# Patient Record
Sex: FEMALE | Employment: OTHER | URBAN - METROPOLITAN AREA
[De-identification: names, ages, dates, MRNs, and addresses within clinical notes are randomized per-mention and may not be internally consistent; named-entity substitution may affect disease eponyms.]

---

## 2022-06-16 PROCEDURE — U0005 INFEC AGEN DETEC AMPLI PROBE: HCPCS | Mod: ORL | Performed by: INTERNAL MEDICINE

## 2022-06-17 ENCOUNTER — LAB REQUISITION (OUTPATIENT)
Dept: LAB | Facility: CLINIC | Age: 87
End: 2022-06-17
Payer: MEDICARE

## 2022-06-17 DIAGNOSIS — U07.1 COVID-19: ICD-10-CM

## 2022-06-17 LAB — SARS-COV-2 RNA RESP QL NAA+PROBE: NEGATIVE

## 2022-06-22 VITALS
DIASTOLIC BLOOD PRESSURE: 80 MMHG | TEMPERATURE: 98 F | HEART RATE: 92 BPM | SYSTOLIC BLOOD PRESSURE: 138 MMHG | WEIGHT: 134 LBS | RESPIRATION RATE: 18 BRPM

## 2022-06-23 ENCOUNTER — ASSISTED LIVING VISIT (OUTPATIENT)
Dept: GERIATRICS | Facility: CLINIC | Age: 87
End: 2022-06-23
Payer: COMMERCIAL

## 2022-06-23 DIAGNOSIS — H90.3 SENSORINEURAL HEARING LOSS, BILATERAL: ICD-10-CM

## 2022-06-23 DIAGNOSIS — R41.89 COGNITIVE IMPAIRMENT: ICD-10-CM

## 2022-06-23 DIAGNOSIS — E03.9 ACQUIRED HYPOTHYROIDISM: ICD-10-CM

## 2022-06-23 DIAGNOSIS — H35.30 MACULAR DEGENERATION (SENILE) OF RETINA: ICD-10-CM

## 2022-06-23 DIAGNOSIS — M51.369 DDD (DEGENERATIVE DISC DISEASE), LUMBAR: Primary | ICD-10-CM

## 2022-06-23 DIAGNOSIS — J47.9 BRONCHIECTASIS WITHOUT ACUTE EXACERBATION (H): ICD-10-CM

## 2022-06-23 DIAGNOSIS — M85.80 OSTEOPENIA, UNSPECIFIED LOCATION: ICD-10-CM

## 2022-06-23 DIAGNOSIS — M54.16 LUMBAR RADICULOPATHY: ICD-10-CM

## 2022-06-23 DIAGNOSIS — R60.0 BILATERAL LEG EDEMA: ICD-10-CM

## 2022-06-23 PROCEDURE — U0003 INFECTIOUS AGENT DETECTION BY NUCLEIC ACID (DNA OR RNA); SEVERE ACUTE RESPIRATORY SYNDROME CORONAVIRUS 2 (SARS-COV-2) (CORONAVIRUS DISEASE [COVID-19]), AMPLIFIED PROBE TECHNIQUE, MAKING USE OF HIGH THROUGHPUT TECHNOLOGIES AS DESCRIBED BY CMS-2020-01-R: HCPCS | Mod: ORL | Performed by: INTERNAL MEDICINE

## 2022-06-23 NOTE — PROGRESS NOTES
[facility] GERIATRICS    PRIMARY CARE PROVIDER AND CLINIC:  Jacinto Skinner, APRN CNP, [address] [address]  Chief Complaint   Patient presents with     [facility] Medical Record Number:  [id number]  Place of Service where encounter took place:  [facility] ([facility]) [[id number]]    Grace Vega  is a 89 year old  (3/15/1929), admitted to the above facility with her  6/7/2022. They moved from [place] to be closer to family. Previous medical care was through [facility].     HPI:    Medical history significant for DDD lumbar with radiculopathy, gait instability, osteoarthritis, hypothyroidism, bronchiectasis, LE edema, macular degeneration, cognitive impairment, left peroneal vein DVT 3/2021.     She reports feeling well and is happy with the move to [place]. Chronic low back and bilateral LE pain are her main issues. Uses tylenol and tramadol with some relief. Uses a cane, walker and wheelchair for longer distances. No falls. Vision is poor but she is able to read print. Good appetite. Denies cough, shortness of breath or chest pain. She stopped taking lasix on her own several months ago with no change in LE edema.  Independent with cares.     CODE STATUS/ADVANCE DIRECTIVES DISCUSSION:  No Order  CPR/Full code   ALLERGIES:   Allergies   Allergen Reactions     Sulfa Drugs       PAST MEDICAL HISTORY:   Past Medical History:   Diagnosis Date     Acquired hypothyroidism      Bilateral leg edema      Bronchiectasis (H)      DDD (degenerative disc disease), lumbar      Deep venous thrombosis (DVT) of left peroneal vein (H) 09/2021     History of basal cell carcinoma      Lumbar radiculopathy      Macular degeneration (senile) of retina      Osteopenia      Pseudophakia     right eye     Sensorineural hearing loss, bilateral      Tinnitus, bilateral      Vertigo       PAST SURGICAL HISTORY:   has a past surgical history that includes  Wrist Ganglion Excision (Left, 1949); Foot surgery; and tonsillectomy.  FAMILY HISTORY: family history includes Colon Cancer in her brother; Diabetes in her brother; Prostate Cancer in her brother.  SOCIAL HISTORY:   reports that she has never smoked. She has never used smokeless tobacco. She reports current alcohol use. She reports that she does not use drugs.  Patient's living condition: lives in an assisted living facility. This is her 2nd marriage and they have several children. 2 live in the [facility] area. She is a retired musician and singer.     Post Discharge Medication Reconciliation Status: discharge medications reconciled, continue medications without change  Current Outpatient Medications   Medication Sig     levothyroxine (SYNTHROID/LEVOTHROID) 88 MCG tablet Take 1 tablet (88 mcg) by mouth daily     traMADol (ULTRAM) 50 MG tablet Take 1 tablet (50 mg) by mouth every 6 hours as needed for severe pain     No current facility-administered medications for this visit.       ROS:  10 point ROS of systems including Constitutional, Eyes, Respiratory, Cardiovascular, Gastroenterology, Genitourinary, Integumentary, Musculoskeletal, Psychiatric were all negative except for pertinent positives noted in my HPI.    Vitals:  /80   Pulse 92   Temp 98  F (36.7  C)   Resp 18   Wt 60.8 kg (134 lb)   Exam:  GENERAL APPEARANCE:  Alert, in no distress  ENT:  [place], oropharynx clear  EYES:  conjunctiva and lids normal  NECK:  no adenopathy, no thyromegaly  RESP:  lungs clear to auscultation , no respiratory distress  CV:  regular rate and rhythm, no murmur, 1+ bilateral LE edema   ABDOMEN:  soft, non-tender, no distension, no masses  M/S:   gait slow. LOPEZ with good strength. Degenerative changes of knees, no acute inflammation   SKIN:  no visible rashes or open areas  PSYCH:  oriented X 3, memory impaired , affect and mood normal    Lab/Diagnostic data:  Recent labs in [facility] reviewed by me today.     ASSESSMENT /  PLAN:  (M51.36) DDD (degenerative disc disease), lumbar  (primary encounter diagnosis)  (M54.16) Lumbar radiculopathy  Comment: long standing. MRI lumbar spine 8/8/2020 showed multilevel degenerative disc changes and height loss, most prominent at L4-L5 on the left.   Plan: continue tylenol, prn tramadol. Refer to [facility] for PHYSICAL THERAPY/OT.     (M85.80) Osteopenia, unspecified location  Comment: per imaging 2013. History of left wrist fracture 2019. Was on fosamax in the past.   Plan: discuss calcium-vitamin D at next visit     (J47.9) Bronchiectasis without acute exacerbation (H)  Comment: no acute issues and not on chronic pulmonary meds   Plan: monitor symptoms     (E03.9) Acquired hypothyroidism  Comment: TSH 0.80 on 4/21/2022  Plan: continue levothyroxine     (R60.0) Bilateral leg edema  Comment: chronic, no other s/s of volume overload.   Doppler US 9/7/2021 showed no change in chronic LE DVTs.   Plan: monitor     (H35.30) Macular degeneration (senile) of retina  Comment: chronic   Plan: continue Preservision. Establish Ophthalmology care.     (H90.3) Sensorineural hearing loss, bilateral  Comment: chronic   Plan: speak slowly and clearly     (R41.89) Cognitive impairment  Comment: appears to have mild deficits   Plan: cognitive testing per home OT. Facility staff will be assuming med admin           Total time spent with patient visit at the AL facility was 45 min including patient visit and review of past records. Greater than 50% of total time spent with counseling and coordinating care due to establishing primary care. Counseling patient and  re: medications and potential side effects, pain management, home care referral, plan of care. Coordinating the following with facility staff: medication orders, plan of care.       Electronically signed by:  FER Greene CNP

## 2022-06-23 NOTE — LETTER
6/23/2022        RE: Grace Vega  No address on file.        [facility] GERIATRICS    PRIMARY CARE PROVIDER AND CLINIC:  FER Greene CNP, [address] [facility] [address]***  Chief Complaint   Patient presents with     [facility] Medical Record Number:  [id number]  Place of Service where encounter took place:  [facility]) [[id number]]    Grace Vega  is a 89 year old  (3/15/1929), admitted to the above facility from home due to care needs ***. .   HPI:    ***    CODE STATUS/ADVANCE DIRECTIVES DISCUSSION:  No Order  CPR/Full code   ALLERGIES:   Allergies   Allergen Reactions     Sulfa Drugs       PAST MEDICAL HISTORY: No past medical history on file.   PAST SURGICAL HISTORY:   has no past surgical history on file.  FAMILY HISTORY: family history is not on file.  SOCIAL HISTORY:     Patient's living condition: lives in an assisted living facility    Post Discharge Medication Reconciliation Status: {ACO Med Rec (Provider):531172}  No current outpatient medications on file.     No current facility-administered medications for this visit.       ROS:  {ROS FGS:369028}    Vitals:  /80   Pulse 92   Temp 98  F (36.7  C)   Resp 18   Wt 60.8 kg (134 lb)   Exam:  {Nursing home physical exam :162703}    Lab/Diagnostic data:  {fgslab:514912}    ASSESSMENT/PLAN:    {FGS DX2:887515}    Orders:  {fgsorders:285749}  ***    Total time spent with patient visit at the [facility] nursing facility was {1/2/3/4/5:223868} including {1/2/3/4/5:208879}. Greater than 50% of total time spent with counseling and coordinating care due to ***.     Electronically signed by:  Cici Osborne ***                      Sincerely,        FER Greene CNP

## 2022-06-24 ENCOUNTER — LAB REQUISITION (OUTPATIENT)
Dept: LAB | Facility: CLINIC | Age: 87
End: 2022-06-24
Payer: MEDICARE

## 2022-06-24 DIAGNOSIS — U07.1 COVID-19: ICD-10-CM

## 2022-06-24 LAB — SARS-COV-2 RNA RESP QL NAA+PROBE: NEGATIVE

## 2022-06-28 PROBLEM — R42 VERTIGO: Status: ACTIVE | Noted: 2022-06-28

## 2022-06-28 PROBLEM — H93.13 TINNITUS, BILATERAL: Status: ACTIVE | Noted: 2022-06-28

## 2022-06-28 PROBLEM — M54.16 LUMBAR RADICULOPATHY: Status: ACTIVE | Noted: 2022-06-28

## 2022-06-28 PROBLEM — M51.369 DDD (DEGENERATIVE DISC DISEASE), LUMBAR: Status: ACTIVE | Noted: 2022-06-28

## 2022-06-28 PROBLEM — J47.9 BRONCHIECTASIS WITHOUT ACUTE EXACERBATION (H): Status: ACTIVE | Noted: 2022-06-28

## 2022-06-28 PROBLEM — Z85.828 HISTORY OF BASAL CELL CARCINOMA: Status: ACTIVE | Noted: 2022-06-28

## 2022-06-28 PROBLEM — M85.80 OSTEOPENIA: Status: ACTIVE | Noted: 2022-06-28

## 2022-06-28 PROBLEM — Z96.1 PSEUDOPHAKIA: Status: ACTIVE | Noted: 2022-06-28

## 2022-06-28 PROBLEM — H35.30 MACULAR DEGENERATION (SENILE) OF RETINA: Status: ACTIVE | Noted: 2022-06-28

## 2022-06-28 PROBLEM — E03.9 ACQUIRED HYPOTHYROIDISM: Status: ACTIVE | Noted: 2022-06-28

## 2022-06-28 PROBLEM — H90.3 SENSORINEURAL HEARING LOSS, BILATERAL: Status: ACTIVE | Noted: 2022-06-28

## 2022-06-28 PROBLEM — R60.0 BILATERAL LEG EDEMA: Status: ACTIVE | Noted: 2022-06-28

## 2022-06-28 RX ORDER — LEVOTHYROXINE SODIUM 88 UG/1
88 TABLET ORAL DAILY
COMMUNITY
End: 2022-07-08

## 2022-06-28 RX ORDER — TRAMADOL HYDROCHLORIDE 50 MG/1
50 TABLET ORAL EVERY 6 HOURS PRN
COMMUNITY
End: 2022-07-07

## 2022-07-07 DIAGNOSIS — M51.369 DDD (DEGENERATIVE DISC DISEASE), LUMBAR: Primary | ICD-10-CM

## 2022-07-07 RX ORDER — TRAMADOL HYDROCHLORIDE 50 MG/1
50 TABLET ORAL EVERY 6 HOURS PRN
Qty: 30 TABLET | Refills: 1 | Status: SHIPPED | OUTPATIENT
Start: 2022-07-07 | End: 2023-03-02 | Stop reason: DRUGHIGH

## 2022-07-08 DIAGNOSIS — E03.9 ACQUIRED HYPOTHYROIDISM: Primary | ICD-10-CM

## 2022-07-08 RX ORDER — LEVOTHYROXINE SODIUM 88 UG/1
88 TABLET ORAL DAILY
Qty: 30 TABLET | Refills: 11 | Status: SHIPPED | OUTPATIENT
Start: 2022-07-08 | End: 2023-05-12

## 2022-07-09 RX ORDER — MV-MIN/FA/VIT K/LUTEIN/ZEAXANT 200MCG-5MG
1 CAPSULE ORAL 2 TIMES DAILY
COMMUNITY
End: 2022-07-11

## 2022-07-11 ENCOUNTER — TELEPHONE (OUTPATIENT)
Dept: GERIATRICS | Facility: CLINIC | Age: 87
End: 2022-07-11

## 2022-07-11 DIAGNOSIS — E56.9 VITAMIN DEFICIENCY: Primary | ICD-10-CM

## 2022-07-11 RX ORDER — MV-MIN/FA/VIT K/LUTEIN/ZEAXANT 200MCG-5MG
1 CAPSULE ORAL DAILY
Qty: 30 CAPSULE | Refills: 11 | Status: SHIPPED | OUTPATIENT
Start: 2022-07-11 | End: 2023-06-14

## 2022-07-11 NOTE — TELEPHONE ENCOUNTER
[place] [facility]ERIATRIC [facility] NON-EMERGENT  ENCOUNTER    Grace Vega is a 89 year old  (3/15/1929), phone call received today regarding: home care    VO provided: PT 1 wk 4 then EOW x 4 weeks. For strengthening, balance and gait training.    Electronically signed by:   Chelsea Mark RN

## 2022-07-12 VITALS
HEART RATE: 92 BPM | WEIGHT: 134 LBS | RESPIRATION RATE: 18 BRPM | TEMPERATURE: 97.6 F | SYSTOLIC BLOOD PRESSURE: 120 MMHG | DIASTOLIC BLOOD PRESSURE: 80 MMHG

## 2022-07-12 NOTE — PROGRESS NOTES
[facility] GERIATRICS    Chief Complaint   Patient presents with     RECHECK     HPI:  Grace Vega is a 89 year old  (3/15/1929), who is being seen today for an episodic care visit at: THE [facility]) [[id number]].   She and her  moved to this facility 6/2022 to be closer to family and for a higher level of care. Previously lived in an apartment in [place] and received care through [facility].   Medical history significant for DDD lumbar with radiculopathy, gait instability, osteoarthritis, hypothyroidism, bronchiectasis, LE edema, macular degeneration, cognitive impairment, left peroneal vein DVT 3/2021.     Today's concern is:      DDD (degenerative disc disease), lumbar  Lumbar radiculopathy  Osteopenia, unspecified location  Bronchiectasis without acute exacerbation (H)  Bilateral leg edema  Cognitive impairment    She reports they are adjusting to the move, though having trouble learning to use the washing machine and the thermostat. Reports chronic back pain radiating down her LLE is unchanged. Also has pain of both feet with ambulation. Tries not to use prn tramadol. Denies new pain. No falls. Ambulates with a walker in the apt and uses a wheelchair to go to the dining room. Independent with toileting and dressing. Staff is now administering meds.     Allergies, and PMH/PSH reviewed in [facility]IC today    REVIEW OF SYSTEMS:  4 point ROS including Respiratory, CV, GI and , other than that noted in the HPI,  is negative    Objective:   /80   Pulse 92   Temp 97.6  F (36.4  C)   Resp 18   Wt 60.8 kg (134 lb)   GENERAL APPEARANCE:  Alert, in no distress, frail appearing   ENT:  [place], oropharynx clear  EYES:  conjunctiva and lids normal  NECK:  no adenopathy or thyromegaly   RESP:  lungs clear to auscultation   CV:  regular rate and rhythm, no murmur, trace bilateral LE edema   ABDOMEN:  soft, non-tender, no distension, no masses  M/S:   gait slow with  walker. LOPEZ with good strength. Degenerative changes of knees, no acute inflammation   SKIN:  no visible rashes or open areas  PSYCH:  oriented X 3, memory impaired , affect and mood normal    Recent labs in [facility] reviewed by me today.     ASSESSMENT / PLAN:  (M51.36) DDD (degenerative disc disease), lumbar  (primary encounter diagnosis)  (M54.16) Lumbar radiculopathy  Comment: chronic pain, affecting mobility and functional status.   MRI lumbar spine 8/8/2020 showed multilevel degenerative disc changes and height loss, most prominent at L4-L5 on the left.   Plan: start lidoderm patch to left hip. Continue tylenol, prn tramadol. Referral has been made to [facility] for PHYSICAL THERAPY/OT-scheduled to start this week.   Discussed with son Olayinka Vega. He lives in [place], but remains primary contact and is available to come to [place] needed.     (M85.80) Osteopenia, unspecified location  Comment: history of left wrist fracture 2019. Was on fosamax in the past.   Plan: consider adding calcium-vitamin D.     (J47.9) Bronchiectasis without acute exacerbation (H)  Comment: no respiratory issues. Not on pulmonary meds  Plan: monitor     (R60.0) Bilateral leg edema  Comment: chronic, minimal   History of chronic LE DVTs-unchanged on US 9/7/2021  Plan: monitor, elevate legs when able     (R41.89) Cognitive impairment  Comment: appears to have mild deficits with poor short term memory. Son has noticed gradual decline in cognition and self cares.   Plan: cognitive testing per home therapies. AL staff assistance with cares, meals, activities and med admin     (Z71.89) Advanced directives, counseling/discussion  Comment: current order is Full Code. Discussed with son who requests DNR/DNI   Plan: orders updated and POLST completed         Electronically signed by: FER Greene CNP

## 2022-07-13 ENCOUNTER — ASSISTED LIVING VISIT (OUTPATIENT)
Dept: GERIATRICS | Facility: CLINIC | Age: 87
End: 2022-07-13
Payer: COMMERCIAL

## 2022-07-13 DIAGNOSIS — M51.369 DDD (DEGENERATIVE DISC DISEASE), LUMBAR: Primary | ICD-10-CM

## 2022-07-13 DIAGNOSIS — J47.9 BRONCHIECTASIS WITHOUT ACUTE EXACERBATION (H): ICD-10-CM

## 2022-07-13 DIAGNOSIS — M85.80 OSTEOPENIA, UNSPECIFIED LOCATION: ICD-10-CM

## 2022-07-13 DIAGNOSIS — R41.89 COGNITIVE IMPAIRMENT: ICD-10-CM

## 2022-07-13 DIAGNOSIS — Z71.89 ADVANCED DIRECTIVES, COUNSELING/DISCUSSION: ICD-10-CM

## 2022-07-13 DIAGNOSIS — M54.16 LUMBAR RADICULOPATHY: ICD-10-CM

## 2022-07-13 DIAGNOSIS — R60.0 BILATERAL LEG EDEMA: ICD-10-CM

## 2022-07-13 RX ORDER — LIDOCAINE 4 G/G
1 PATCH TOPICAL EVERY 24 HOURS
Qty: 30 PATCH | Refills: 11 | Status: SHIPPED | OUTPATIENT
Start: 2022-07-13 | End: 2022-08-24

## 2022-07-13 NOTE — LETTER
7/13/2022        RE: Grace Vega  [id number] [place] [address] GERIATRICS    Chief Complaint   Patient presents with     RECHECK     HPI:  Grace Vega is a 89 year old  (3/15/1929), who is being seen today for an episodic care visit at: THE [facility] ([facility]) [[id number]]. Today's concern is: ***    Allergies, and PMH/PSH reviewed in [facility] today.  REVIEW OF SYSTEMS:  {rwvmeu40:378843}    Objective:   /80   Pulse 92   Temp 97.6  F (36.4  C)   Resp 18   Wt 60.8 kg (134 lb)   {Nursing home physical exam :217399}    {fgslab:168511}    Assessment/Plan:  {FGS DX2:851792}    Orders:  {fgsorders:909782}  ***    Electronically signed by: Cici Osborne ***            Sincerely,        FER Greene CNP

## 2022-07-30 ENCOUNTER — TELEPHONE (OUTPATIENT)
Dept: GERIATRICS | Facility: CLINIC | Age: 87
End: 2022-07-30

## 2022-08-24 DIAGNOSIS — M51.369 DDD (DEGENERATIVE DISC DISEASE), LUMBAR: ICD-10-CM

## 2022-08-24 RX ORDER — LIDOCAINE 4 G/G
1 PATCH TOPICAL EVERY 24 HOURS
Qty: 30 PATCH | Refills: 11 | Status: SHIPPED | OUTPATIENT
Start: 2022-08-24 | End: 2023-11-17

## 2022-08-26 ENCOUNTER — APPOINTMENT (OUTPATIENT)
Dept: CT IMAGING | Facility: CLINIC | Age: 87
End: 2022-08-26
Attending: INTERNAL MEDICINE
Payer: COMMERCIAL

## 2022-08-26 ENCOUNTER — HOSPITAL ENCOUNTER (EMERGENCY)
Facility: CLINIC | Age: 87
Discharge: NURSING, REHAB OR LONG-TERM CARE | End: 2022-08-26
Attending: INTERNAL MEDICINE | Admitting: INTERNAL MEDICINE
Payer: COMMERCIAL

## 2022-08-26 ENCOUNTER — APPOINTMENT (OUTPATIENT)
Dept: GENERAL RADIOLOGY | Facility: CLINIC | Age: 87
End: 2022-08-26
Attending: INTERNAL MEDICINE
Payer: COMMERCIAL

## 2022-08-26 VITALS
SYSTOLIC BLOOD PRESSURE: 155 MMHG | HEART RATE: 87 BPM | BODY MASS INDEX: 25.12 KG/M2 | RESPIRATION RATE: 17 BRPM | HEIGHT: 62 IN | OXYGEN SATURATION: 95 % | DIASTOLIC BLOOD PRESSURE: 126 MMHG | WEIGHT: 136.5 LBS | TEMPERATURE: 98.2 F

## 2022-08-26 DIAGNOSIS — S10.93XA CONTUSION OF FACE, SCALP AND NECK, INITIAL ENCOUNTER: ICD-10-CM
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
## 2022-08-26 DIAGNOSIS — S00.03XA CONTUSION OF FACE, SCALP AND NECK, INITIAL ENCOUNTER: ICD-10-CM

## 2022-08-26 DIAGNOSIS — S00.83XA CONTUSION OF FACE, SCALP AND NECK, INITIAL ENCOUNTER: ICD-10-CM

## 2022-08-26 DIAGNOSIS — S01.81XA LACERATION OF FOREHEAD, INITIAL ENCOUNTER: ICD-10-CM

## 2022-08-26 LAB
ALBUMIN SERPL BCG-MCNC: 3.9 G/DL (ref 3.5–5.2)
ALP SERPL-CCNC: 72 U/L (ref 35–104)
ALT SERPL W P-5'-P-CCNC: <5 U/L (ref 10–35)
ANION GAP SERPL CALCULATED.3IONS-SCNC: 10 MMOL/L (ref 7–15)
AST SERPL W P-5'-P-CCNC: 20 U/L (ref 10–35)
BASOPHILS # BLD AUTO: 0.1 10E3/UL (ref 0–0.2)
BASOPHILS NFR BLD AUTO: 1 %
BILIRUB SERPL-MCNC: 0.4 MG/DL
BUN SERPL-MCNC: 27.5 MG/DL (ref 8–23)
CALCIUM SERPL-MCNC: 9 MG/DL (ref 8.2–9.6)
CHLORIDE SERPL-SCNC: 108 MMOL/L (ref 98–107)
CREAT SERPL-MCNC: 1.64 MG/DL (ref 0.51–0.95)
DEPRECATED HCO3 PLAS-SCNC: 23 MMOL/L (ref 22–29)
EOSINOPHIL # BLD AUTO: 0.4 10E3/UL (ref 0–0.7)
EOSINOPHIL NFR BLD AUTO: 4 %
ERYTHROCYTE [DISTWIDTH] IN BLOOD BY AUTOMATED COUNT: 13.7 % (ref 10–15)
GFR SERPL CREATININE-BSD FRML MDRD: 29 ML/MIN/1.73M2
GLUCOSE SERPL-MCNC: 94 MG/DL (ref 70–99)
HCT VFR BLD AUTO: 37.9 % (ref 35–47)
HGB BLD-MCNC: 12.6 G/DL (ref 11.7–15.7)
HOLD SPECIMEN: NORMAL
HOLD SPECIMEN: NORMAL
IMM GRANULOCYTES # BLD: 0 10E3/UL
IMM GRANULOCYTES NFR BLD: 0 %
INR PPP: 0.96 (ref 0.85–1.15)
LYMPHOCYTES # BLD AUTO: 1.7 10E3/UL (ref 0.8–5.3)
LYMPHOCYTES NFR BLD AUTO: 15 %
MCH RBC QN AUTO: 33.2 PG (ref 26.5–33)
MCHC RBC AUTO-ENTMCNC: 33.2 G/DL (ref 31.5–36.5)
MCV RBC AUTO: 100 FL (ref 78–100)
MONOCYTES # BLD AUTO: 1 10E3/UL (ref 0–1.3)
MONOCYTES NFR BLD AUTO: 9 %
NEUTROPHILS # BLD AUTO: 7.8 10E3/UL (ref 1.6–8.3)
NEUTROPHILS NFR BLD AUTO: 71 %
NRBC # BLD AUTO: 0 10E3/UL
NRBC BLD AUTO-RTO: 0 /100
PLATELET # BLD AUTO: 274 10E3/UL (ref 150–450)
POTASSIUM SERPL-SCNC: 4.2 MMOL/L (ref 3.4–5.3)
PROT SERPL-MCNC: 6.9 G/DL (ref 6.4–8.3)
RBC # BLD AUTO: 3.79 10E6/UL (ref 3.8–5.2)
SODIUM SERPL-SCNC: 141 MMOL/L (ref 136–145)
WBC # BLD AUTO: 11.1 10E3/UL (ref 4–11)

## 2022-08-26 PROCEDURE — 93005 ELECTROCARDIOGRAM TRACING: CPT | Performed by: INTERNAL MEDICINE

## 2022-08-26 PROCEDURE — 250N000009 HC RX 250: Performed by: INTERNAL MEDICINE

## 2022-08-26 PROCEDURE — 73590 X-RAY EXAM OF LOWER LEG: CPT | Mod: RT

## 2022-08-26 PROCEDURE — 72125 CT NECK SPINE W/O DYE: CPT

## 2022-08-26 PROCEDURE — 90471 IMMUNIZATION ADMIN: CPT | Performed by: INTERNAL MEDICINE

## 2022-08-26 PROCEDURE — 12011 RPR F/E/E/N/L/M 2.5 CM/<: CPT | Performed by: INTERNAL MEDICINE

## 2022-08-26 PROCEDURE — 72125 CT NECK SPINE W/O DYE: CPT | Mod: 26 | Performed by: RADIOLOGY

## 2022-08-26 PROCEDURE — 99285 EMERGENCY DEPT VISIT HI MDM: CPT | Mod: 25 | Performed by: INTERNAL MEDICINE

## 2022-08-26 PROCEDURE — 93010 ELECTROCARDIOGRAM REPORT: CPT | Mod: 59 | Performed by: INTERNAL MEDICINE

## 2022-08-26 PROCEDURE — 250N000011 HC RX IP 250 OP 636: Performed by: INTERNAL MEDICINE

## 2022-08-26 PROCEDURE — 85025 COMPLETE CBC W/AUTO DIFF WBC: CPT | Performed by: INTERNAL MEDICINE

## 2022-08-26 PROCEDURE — 70450 CT HEAD/BRAIN W/O DYE: CPT | Mod: 26 | Performed by: RADIOLOGY

## 2022-08-26 PROCEDURE — 82040 ASSAY OF SERUM ALBUMIN: CPT | Performed by: INTERNAL MEDICINE

## 2022-08-26 PROCEDURE — 70450 CT HEAD/BRAIN W/O DYE: CPT

## 2022-08-26 PROCEDURE — 90715 TDAP VACCINE 7 YRS/> IM: CPT | Performed by: INTERNAL MEDICINE

## 2022-08-26 PROCEDURE — 73590 X-RAY EXAM OF LOWER LEG: CPT | Mod: 26 | Performed by: RADIOLOGY

## 2022-08-26 PROCEDURE — 85610 PROTHROMBIN TIME: CPT | Performed by: INTERNAL MEDICINE

## 2022-08-26 PROCEDURE — 36415 COLL VENOUS BLD VENIPUNCTURE: CPT | Performed by: INTERNAL MEDICINE

## 2022-08-26 PROCEDURE — 80053 COMPREHEN METABOLIC PANEL: CPT | Performed by: INTERNAL MEDICINE

## 2022-08-26 RX ORDER — LIDOCAINE HYDROCHLORIDE AND EPINEPHRINE 10; 10 MG/ML; UG/ML
5 INJECTION, SOLUTION INFILTRATION; PERINEURAL ONCE
Status: COMPLETED | OUTPATIENT
Start: 2022-08-26 | End: 2022-08-26

## 2022-08-26 RX ADMIN — LIDOCAINE HYDROCHLORIDE AND EPINEPHRINE 5 ML: 10; 10 INJECTION, SOLUTION INFILTRATION; PERINEURAL at 21:45

## 2022-08-26 RX ADMIN — CLOSTRIDIUM TETANI TOXOID ANTIGEN (FORMALDEHYDE INACTIVATED), CORYNEBACTERIUM DIPHTHERIAE TOXOID ANTIGEN (FORMALDEHYDE INACTIVATED), BORDETELLA PERTUSSIS TOXOID ANTIGEN (GLUTARALDEHYDE INACTIVATED), BORDETELLA PERTUSSIS FILAMENTOUS HEMAGGLUTININ ANTIGEN (FORMALDEHYDE INACTIVATED), BORDETELLA PERTUSSIS PERTACTIN ANTIGEN, AND BORDETELLA PERTUSSIS FIMBRIAE 2/3 ANTIGEN 0.5 ML: 5; 2; 2.5; 5; 3; 5 INJECTION, SUSPENSION INTRAMUSCULAR at 22:05

## 2022-08-26 ASSESSMENT — ENCOUNTER SYMPTOMS
NUMBNESS: 0
DYSURIA: 0
PALPITATIONS: 0
COUGH: 0
FEVER: 0
HEADACHES: 0
NAUSEA: 0
WEAKNESS: 0
ADENOPATHY: 0
WOUND: 1
SPEECH DIFFICULTY: 0
SHORTNESS OF BREATH: 0
LIGHT-HEADEDNESS: 0
NECK PAIN: 0
CONFUSION: 0
BACK PAIN: 0
CHILLS: 0
RECTAL PAIN: 0
ABDOMINAL PAIN: 0

## 2022-08-26 ASSESSMENT — ACTIVITIES OF DAILY LIVING (ADL)
ADLS_ACUITY_SCORE: 35

## 2022-08-26 NOTE — ED PROVIDER NOTES
ED Provider Note  [facility]      History     Chief Complaint   Patient presents with     Fall     HPI  Grace Vega is a 89 year old female who present for evaluation after a fall at her assisted living facility. She  Fell in the bathroom, striking her head against a wall as she slid down. She has a large right forehead hematoma. She denies headache, neck pain, back pain, chest pain or pain in her upper extremities including her shoulder, elbows and wrists. She has pain in her right knee and leg. She states this has been ongoing. She denies difficulty with vision, nausea, vomiting, cough, chest pain, abdominal pain or pain in the hips.    Past Medical History:   Diagnosis Date     Acquired hypothyroidism      Bilateral leg edema      Bronchiectasis (H)      DDD (degenerative disc disease), lumbar      Deep venous thrombosis (DVT) of left peroneal vein (H) 09/2021     History of basal cell carcinoma      Lumbar radiculopathy      Macular degeneration (senile) of retina      Osteopenia      Pseudophakia     right eye     Sensorineural hearing loss, bilateral      Tinnitus, bilateral      Vertigo        Past Surgical History:   Procedure Laterality Date     FOOT SURGERY       TONSILLECTOMY       WRIST GANGLION EXCISION Left 1949       Family History   Problem Relation Age of Onset     Prostate Cancer Brother      Diabetes Brother      Colon Cancer Brother        Social History     Tobacco Use     Smoking status: Never Smoker     Smokeless tobacco: Never Used   Substance Use Topics     Alcohol use: Yes     Comment: Occasional wine with dinner          Review of Systems   Constitutional: Negative for chills and fever.   HENT: Negative for congestion, nosebleeds and tinnitus.    Eyes: Negative for visual disturbance.   Respiratory: Negative for cough and shortness of breath.    Cardiovascular: Negative for chest pain, palpitations and leg swelling.   Gastrointestinal: Negative for abdominal  "pain, nausea and rectal pain.   Genitourinary: Negative for dysuria.   Musculoskeletal: Negative for back pain and neck pain.   Skin: Positive for wound. Negative for rash.   Neurological: Negative for speech difficulty, weakness, light-headedness, numbness and headaches.   Hematological: Negative for adenopathy.   Psychiatric/Behavioral: Negative for confusion.       Physical Exam   BP: (!) 169/77  Pulse: 87  Temp: 98.1  F (36.7  C)  Resp: 18  Height: 157.5 cm (5' 2\")  Weight: 61.9 kg (136 lb 8 oz) (bed scale)  SpO2: 98 %  Physical Exam  Vitals and nursing note reviewed.   Constitutional:       General: She is not in acute distress.  HENT:      Head: Abrasion, contusion and laceration present.      Jaw: There is normal jaw occlusion.        Right Ear: External ear normal.      Left Ear: External ear normal.      Nose: No nasal deformity, laceration (abrasion) or nasal tenderness.      Right Nostril: No epistaxis.      Left Nostril: No epistaxis.      Mouth/Throat:      Mouth: Mucous membranes are moist.   Eyes:      General: No scleral icterus.     Extraocular Movements: Extraocular movements intact.      Pupils: Pupils are equal, round, and reactive to light.   Cardiovascular:      Rate and Rhythm: Normal rate and regular rhythm.      Heart sounds: No murmur heard.  Pulmonary:      Effort: Pulmonary effort is normal.      Breath sounds: Normal breath sounds. No wheezing or rales.   Abdominal:      General: Abdomen is flat.      Palpations: Abdomen is soft.      Tenderness: There is no abdominal tenderness. There is no guarding.   Musculoskeletal:         General: Tenderness present.      Right shoulder: Normal.      Left shoulder: Normal.      Right upper arm: Normal.      Left upper arm: Normal.      Right elbow: Normal.      Left elbow: Normal.      Right forearm: Normal.      Left forearm: Normal.      Right wrist: Normal.      Left wrist: Normal.      Cervical back: Normal range of motion and neck supple. No " tenderness.      Right hip: Normal.      Left hip: Normal.      Right knee: Tenderness present.      Left knee: No tenderness.      Right lower leg: Tenderness present. No swelling.      Left lower leg: No swelling or tenderness.      Right ankle: Normal.      Left ankle: Normal.        Legs:    Skin:     General: Skin is warm and dry.   Neurological:      General: No focal deficit present.      Mental Status: She is alert.      Cranial Nerves: No cranial nerve deficit.      Sensory: No sensory deficit.      Motor: No weakness.   Psychiatric:         Mood and Affect: Mood normal.         Behavior: Behavior normal.         ED Course      Procedures            EKG Interpretation:      Interpreted by KALYAN BAUMANN MD  Time reviewed: 1838  Symptoms at time of EKG: None   Rhythm: normal sinus   Rate: Normal  Axis: Normal  Ectopy: none  Conduction: normal  ST Segments/ T Waves: T wave flattening Global  Q Waves: none  Comparison to prior: Unchanged from 2/14/18    Clinical Impression: no acute changes             Labs/Imaging    Results for orders placed or performed during the hospital encounter of 08/26/22 (from the past 24 hour(s))   EKG 12-lead, tracing only   Result Value Ref Range    Systolic Blood Pressure  mmHg    Diastolic Blood Pressure  mmHg    Ventricular Rate 85 BPM    Atrial Rate 85 BPM    MD Interval 120 ms    QRS Duration 70 ms     ms    QTc 440 ms    P Axis 9 degrees    R AXIS 15 degrees    T Axis 9 degrees    Interpretation ECG       Sinus rhythm  Nonspecific T wave abnormality  Abnormal ECG     CBC with platelets differential    Narrative    The following orders were created for panel order CBC with platelets differential.  Procedure                               Abnormality         Status                     ---------                               -----------         ------                     CBC with platelets and d...[[id number]]  Abnormal            Final result                 Please view  results for these tests on the individual orders.   INR   Result Value Ref Range    INR 0.96 0.85 - 1.15   Comprehensive metabolic panel   Result Value Ref Range    Sodium 141 136 - 145 mmol/L    Potassium 4.2 3.4 - 5.3 mmol/L    Creatinine 1.64 (H) 0.51 - 0.95 mg/dL    Urea Nitrogen 27.5 (H) 8.0 - 23.0 mg/dL    Chloride 108 (H) 98 - 107 mmol/L    Carbon Dioxide (CO2) 23 22 - 29 mmol/L    Anion Gap 10 7 - 15 mmol/L    Glucose 94 70 - 99 mg/dL    Calcium 9.0 8.2 - 9.6 mg/dL    Protein Total 6.9 6.4 - 8.3 g/dL    Albumin 3.9 3.5 - 5.2 g/dL    Bilirubin Total 0.4 <=1.2 mg/dL    Alkaline Phosphatase 72 35 - 104 U/L    AST 20 10 - 35 U/L    ALT <5 (L) 10 - 35 U/L    GFR Estimate 29 (L) >60 mL/min/1.73m2   [place] Draw    Narrative    The following orders were created for panel order [place] Draw.  Procedure                               Abnormality         Status                     ---------                               -----------         ------                     Extra Red Top Tube[[id number]]                               Final result                 Please view results for these tests on the individual orders.   CBC with platelets and differential   Result Value Ref Range    WBC Count 11.1 (H) 4.0 - 11.0 10e3/uL    RBC Count 3.79 (L) 3.80 - 5.20 10e6/uL    Hemoglobin 12.6 11.7 - 15.7 g/dL    Hematocrit 37.9 35.0 - 47.0 %     78 - 100 fL    MCH 33.2 (H) 26.5 - 33.0 pg    MCHC 33.2 31.5 - 36.5 g/dL    RDW 13.7 10.0 - 15.0 %    Platelet Count 274 150 - 450 10e3/uL    % Neutrophils 71 %    % Lymphocytes 15 %    % Monocytes 9 %    % Eosinophils 4 %    % Basophils 1 %    % Immature Granulocytes 0 %    NRBCs per 100 WBC 0 <1 /100    Absolute Neutrophils 7.8 1.6 - 8.3 10e3/uL    Absolute Lymphocytes 1.7 0.8 - 5.3 10e3/uL    Absolute Monocytes 1.0 0.0 - 1.3 10e3/uL    Absolute Eosinophils 0.4 0.0 - 0.7 10e3/uL    Absolute Basophils 0.1 0.0 - 0.2 10e3/uL    Absolute Immature Granulocytes 0.0 <=0.4 10e3/uL    Absolute  NRBCs 0.0 10e3/uL   Extra Red Top Tube   Result Value Ref Range    Hold Specimen JIC    Extra Tube    Narrative    The following orders were created for panel order Extra Tube.  Procedure                               Abnormality         Status                     ---------                               -----------         ------                     Extra Green Top (Lithium...[[id number]]                      In process                   Please view results for these tests on the individual orders.   CT Head w/o Contrast    Narrative    CT HEAD W/O CONTRAST 8/26/2022 7:24 PM    Provided History: head trauma  ICD-10:    Comparison: None.    Technique: Using multidetector thin collimation helical acquisition  technique, axial, coronal and sagittal CT images from the skull base  to the vertex were obtained without intravenous contrast.     Findings:    No intracranial hemorrhage, mass effect, or midline shift. The  ventricles are proportionate to the cerebral sulci. The gray to white  matter differentiation of the cerebral hemispheres is preserved. The  basal cisterns are patent. Mild-to-moderate diffuse cerebral atrophy  and vascular calcifications. Mild periventricular white matter  hypoattenuation.       Small right frontal scalp hematoma and laceration. The bony calvarium  is within normal limits.The visualized paranasal sinuses are clear.  The mastoid air cells are clear. Right pseudophakia.       Impression    Impression:  1. No acute intracranial pathology.   2. Frontal scalp hematoma and laceration.  3. Mild to moderate diffuse cerebral atrophy and mild small vessel  ischemic disease.    I have personally reviewed the examination and initial interpretation  and I agree with the findings.    KALYAN WANG MD         SYSTEM ID:  [id number]   CT Cervical Spine w/o Contrast    Narrative    CT CERVICAL SPINE W/O CONTRAST 8/26/2022 7:24 PM    Provided History: head trauma    Comparison: None    Technique: Using  multidetector thin collimation helical acquisition  technique, axial, coronal and sagittal CT images through the cervical  spine were obtained without intravenous contrast.     Findings:  The cervical vertebrae are normally aligned. Straightening of normal  cervical lordosis. No acute fracture or traumatic subluxation. Mild  anterolisthesis of C4 on C5. No prevertebral edema. Nonunion of the  posterior arch of C1, normal variant. There is ankylosis of the  posterior elements of C2-C4 on the right and from C2 through C3 on the  left There is disc height narrowing at C3-4, C5-6, and C6-7.  Multilevel degenerative changes with osteophytic spurring. The  findings on a level by level basis are as follows:    C2-3:  No spinal canal or neural foraminal stenosis.    C3-4:  Posterior disc osteophyte complex and facet arthropathy with  mild bilateral neural foraminal narrowing. The spinal canal is patent.    C4-5:  Posterior disc osteophyte complex and bilateral facet  arthropathy with mild bilateral neural foraminal narrowing. The spinal  canal is patent.    C5-6:  Posterior disc osteophyte complex, uncinate hypertrophy and  bilateral facet arthropathy with moderate bilateral neural foraminal  narrowing. Mild spinal canal stenosis.    C6-7:  Posterior disc osteophyte complex and facet arthropathy with  mild to moderate bilateral neural foraminal narrowing. Mild spinal  canal stenosis.    C7-T1:  No spinal canal or neural foraminal stenosis.     No abnormality of the paraspinous soft tissues.      Impression    Impression:     1. No acute fracture or traumatic subluxation.  2. Multilevel degenerative changes of the cervical spine, greatest at  C5-6 with moderate bilateral neural foraminal narrowing and mild  spinal canal stenosis.    I have personally reviewed the examination and initial interpretation  and I agree with the findings.    KALYAN WANG MD         SYSTEM ID:  [id number]   XR Tibia & Fibula Right 2 Views     Narrative    EXAM: XR TIBIA AND FIBULA RIGHT 2 VIEWS  LOCATION: [facility]  DATE/TIME: 8/26/2022 8:51 PM    INDICATION: Fall, pain.  COMPARISON: None.      Impression    IMPRESSION:   1. Diffuse bone demineralization.  2. No fracture.  3. Extensive arterial calcifications.  4. Hallux valgus, lateral subluxation of the sesamoids, bunion and first MTP joint osteoarthrosis.  5. Tricompartmental degenerative changes in the knee.     [facility] Procedure Note        Laceration Repair:    Performed by: KALYAN BAUMANN MD  Authorized by: KALYAN BAUMANN MD  Consent given by: Patient who states understanding of the procedure being performed after discussing the risks, benefits and alternatives.    Preparation: Patient was prepped and draped in usual sterile fashion.  Irrigation solution: saline    Body area:face  Laceration length: 2cm  Contamination: The wound is not contaminated.  Foreign bodies:none  Tendon involvement: none  Anesthesia: Local  Local anesthetic: Lidocaine     1%, with epinephrine  Anesthetic total: 3ml    Debridement: none  Skin closure: Closed with 3 x 4.0 Ethilon  Technique: interrupted  Approximation: close  Approximation difficulty: simple    Patient tolerance: Patient tolerated the procedure well with no immediate complications.  Medications   lidocaine 1% with EPINEPHrine 1:100,000 injection 5 mL (has no administration in time range)        Assessments & Plan (with Medical Decision Making)   Impression:  Elderly woman presents with a fall in the bathroom at her assisted living facility. She has a small laceration in the mid forehead and moderately large hematoma of the right forehead. She has an abrasion of the left occiput. She has no intracranial hemorrhage, no fractures and no acute traumatic injuries other than the scalp hematoma. She has abrasion on the bridge of the nose without evident fractures. She has diffuse degenerative changes of  the cervical spine without acute traumatic injury. She has no other evidence of trauma. She has chronic bilateral knee pain and her right knee is fairly tender on exam. There are no acute fractures on XR. EKG, CBC and electrolytes are unremarkable. She has CKD of uncertain duration. She has not yet established primary medical care since moving here from [place] a couple of months ago.    I have reviewed the nursing notes. I have reviewed the findings, diagnosis, plan and need for follow up with the patient.    New Prescriptions    No medications on file       Final diagnoses:   Fall from standing, initial encounter   Laceration of forehead, initial encounter   Contusion of face, scalp and neck, initial encounter       --  Chi Aguirre  [facility] EMERGENCY DEPARTMENT  8/26/2022     Chi Aguirre MD  08/26/22 3043

## 2022-08-26 NOTE — ED TRIAGE NOTES
Patient presents to ED after a fall. Patient reports she fell in the bathroom at her assisted living. Patient denies loss of consciousness and head/neck pain. Patient denies being on a blood thinner.Patient reports right knee pain, which she states she had prior to fall.

## 2022-08-27 LAB
ATRIAL RATE - MUSE: 85 BPM
DIASTOLIC BLOOD PRESSURE - MUSE: NORMAL MMHG
INTERPRETATION ECG - MUSE: NORMAL
P AXIS - MUSE: 9 DEGREES
PR INTERVAL - MUSE: 120 MS
QRS DURATION - MUSE: 70 MS
QT - MUSE: 370 MS
QTC - MUSE: 440 MS
R AXIS - MUSE: 15 DEGREES
SYSTOLIC BLOOD PRESSURE - MUSE: NORMAL MMHG
T AXIS - MUSE: 9 DEGREES
VENTRICULAR RATE- MUSE: 85 BPM

## 2022-08-27 NOTE — DISCHARGE INSTRUCTIONS
Bacitracin/ gauze dressing to the wounds.  Elastic compression dressing for 1 day.  Suture removal in 6-8 days.   Follow up with Jacinto Skinner.at [facility]. [phone].  Return for headaches, fever, changes in level of alertness or any new symptoms.

## 2022-08-29 ENCOUNTER — NURSE TRIAGE (OUTPATIENT)
Dept: NURSING | Facility: CLINIC | Age: 87
End: 2022-08-29

## 2022-08-29 NOTE — TELEPHONE ENCOUNTER
Patient calling requesting to schedule suture removal.    Sutures placed in forehead on 8/26/22 at [facility].    Per discharge instructions to remove 6-8 days after. Denies further questions on symptoms.    Requesting appointment only.     Transferred to Central Scheduling.    Sandra Buckner RN  [facility]        Reason for Disposition    Information only question and nurse able to answer    Additional Information    Negative: Nursing judgment    Negative: Nursing judgment    Negative: Nursing judgment    Negative: Nursing judgment    Protocols used: INFORMATION ONLY CALL - NO TRIAGE-A-OH

## 2022-08-31 PROBLEM — R52 PAIN: Status: ACTIVE | Noted: 2019-01-10

## 2022-08-31 PROBLEM — H25.812 COMBINED FORMS OF AGE-RELATED CATARACT OF LEFT EYE: Status: ACTIVE | Noted: 2021-03-18

## 2022-08-31 PROBLEM — M25.60 STIFFNESS OF MULTIPLE JOINTS: Status: ACTIVE | Noted: 2019-01-10

## 2022-08-31 PROBLEM — M25.532 LEFT WRIST PAIN: Status: ACTIVE | Noted: 2019-01-07

## 2022-08-31 PROBLEM — I82.452 ACUTE DEEP VEIN THROMBOSIS (DVT) OF LEFT PERONEAL VEIN (H): Status: ACTIVE | Noted: 2021-03-29

## 2022-08-31 PROBLEM — R05.3 CHRONIC COUGH: Status: ACTIVE | Noted: 2019-01-07

## 2022-08-31 PROBLEM — H40.033 ANATOMICAL NARROW ANGLE, BILATERAL: Status: ACTIVE | Noted: 2019-09-06

## 2022-08-31 PROBLEM — S52.502A CLOSED FRACTURE OF LEFT DISTAL RADIUS: Status: ACTIVE | Noted: 2019-01-07

## 2022-08-31 NOTE — PROGRESS NOTES
[facility] GERIATRICS    Chief Complaint   Patient presents with     RECHECK     HPI:  Grace Vega is a 89 year old  (3/15/1929), who is being seen today for an episodic care visit at: THE [facility]) [[id number]].   She and her  moved to this facility 6/2022 to be closer to family and for a higher level of care. Previously lived in an apartment in [place] and received care through [facility].   Medical history significant for DDD lumbar with radiculopathy, gait instability, osteoarthritis, hypothyroidism, bronchiectasis, CKD stage 3, LE edema, macular degeneration, cognitive impairment, left peroneal vein DVT 3/2021.     Today's concern is:      Facial laceration, subsequent encounter  Fall, subsequent encounter  Bronchiectasis without acute exacerbation (H)  DDD (degenerative disc disease), lumbar  Lumbar radiculopathy  Osteopenia, unspecified location  Cognitive impairment  Physical deconditioning  She is seen today to follow up after an ED visit 8/26/2022 for a mechanical fall with head strike. She slipped on the wet floor while doing laundry. CT head showed a right frontal scalp hematoma,mild to moderate diffuse cerebral atrophy and mild small vessel ischemic disease, no acute intracranial pathology. CT cervical spine showed multilevel degenerative changes and no acute fracture or subluxation. XR right tibia and fibula negative for fracture. Creatinine was 1.64. Facial laceration was repaired with 3 sutures.   Jeff Vega is present. She reports feeling fairly well. Denies headache or facial pain. Bruising has improved. Reports her chronic cough has been worse since moving to [place]. No shortness of breath or chest pain. Using Robitussin prn without much improvement. Good appetite. Continues to work with therapies. Ambulates short distances with a walker, uses a wheelchair for longer distances outside of their apartment. Independent with toileting and  dressing.       Allergies, and PMH/PSH reviewed in EPIC today    REVIEW OF SYSTEMS:  4 point ROS including Respiratory, CV, GI and , other than that noted in the HPI,  is negative    Objective:   /80   Pulse 60   Temp 98.5  F (36.9  C)   Resp 13   SpO2 96%   GENERAL APPEARANCE:  Alert, in no distress, frail appearing   ENT:  [place], oropharynx clear  EYES:  conjunctiva and lids normal  NECK:  no adenopathy or thyromegaly   RESP:  lungs clear to auscultation, no crackles or wheezes   CV:  regular rate and rhythm, no murmur, trace bilateral LE edema   ABDOMEN:  soft, non-tender, no distension, no masses  M/S:   gait slow with walker. LOPEZ with good strength. Degenerative changes of knees, no acute inflammation   SKIN:  fading facial bruising. 3 sutures mid forehead removed, laceration clean, no erythema   PSYCH:  oriented X 3, memory impaired , affect and mood normal    Recent labs in [facility] reviewed by me today.     ASSESSMENT / PLAN:  (S01.81XD) Facial laceration, subsequent encounter  (primary encounter diagnosis)  (W19.XXXD) Fall, subsequent encounter  Comment: laceration healing without signs of infection and sutures removed. Facial bruising resolving. No other injuries   Plan: monitor, continue therapies.     (J47.9) Bronchiectasis without acute exacerbation (H)  Comment: chronic cough is unchanged. Lungs clear on exam. Not on chronic pulmonary meds   Plan: Robitussin prn. CXR if cough worsens     (N18.32) Stage 3b chronic kidney disease (H)  Comment: creatinine 1. 64 is up from baseline creatinine 1.3   Plan: encourage fluids. Repeat BMP in a few weeks. Avoid nephrotoxins, renal dose meds.     (M51.36) DDD (degenerative disc disease), lumbar  (M54.16) Lumbar radiculopathy  Comment: chronic pain, poor mobility. She declines trying stronger pain meds due to potential side effects. Has had epidural steroid injection in the past without much improvement.   MRI lumbar spine 8/8/2020 showed multilevel  degenerative disc changes and height loss, most prominent at L4-L5 on the left.   Plan: continue tylenol, lidoderm patch, prn tramadol. Continue therapies.     (R41.89) Cognitive impairment  Comment: mild to moderate deficits, poor short term memory   Plan: [place] staff assistance with cares, meals, activities, med admin. Family very involved and assists with laundry, shopping, finances.     (R53.81) Physical deconditioning  Comment: continues to progress in therapies   Plan: continue home PHYSICAL THERAPY/OT. Walker or wheelchair for mobility         Electronically signed by: FER Greene CNP

## 2022-09-01 VITALS
SYSTOLIC BLOOD PRESSURE: 120 MMHG | OXYGEN SATURATION: 96 % | DIASTOLIC BLOOD PRESSURE: 80 MMHG | TEMPERATURE: 98.5 F | RESPIRATION RATE: 13 BRPM | HEART RATE: 60 BPM

## 2022-09-02 ENCOUNTER — ASSISTED LIVING VISIT (OUTPATIENT)
Dept: GERIATRICS | Facility: CLINIC | Age: 87
End: 2022-09-02
Payer: COMMERCIAL
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
## 2022-09-02 DIAGNOSIS — N18.32 STAGE 3B CHRONIC KIDNEY DISEASE (H): ICD-10-CM

## 2022-09-02 DIAGNOSIS — J47.9 BRONCHIECTASIS WITHOUT ACUTE EXACERBATION (H): ICD-10-CM

## 2022-09-02 DIAGNOSIS — S01.81XD FACIAL LACERATION, SUBSEQUENT ENCOUNTER: Primary | ICD-10-CM

## 2022-09-02 DIAGNOSIS — M51.369 DDD (DEGENERATIVE DISC DISEASE), LUMBAR: ICD-10-CM

## 2022-09-02 DIAGNOSIS — M54.16 LUMBAR RADICULOPATHY: ICD-10-CM

## 2022-09-02 DIAGNOSIS — R41.89 COGNITIVE IMPAIRMENT: ICD-10-CM

## 2022-09-02 DIAGNOSIS — R53.81 PHYSICAL DECONDITIONING: ICD-10-CM

## 2022-09-02 NOTE — LETTER
9/2/2022        RE: Grace Vega  [facility]  [address] [address]        [facility] GERIATRICS    Chief Complaint   Patient presents with     RECHECK     HPI:  Grace Vega is a 89 year old  (3/15/1929), who is being seen today for an episodic care visit at: THE [facility] ([facility]) [[id number]].   She and her  moved to this facility 6/2022 to be closer to family and for a higher level of care. Previously lived in an apartment in [place] and received care through [facility].   Medical history significant for DDD lumbar with radiculopathy, gait instability, osteoarthritis, hypothyroidism, bronchiectasis, CKD stage 3, LE edema, macular degeneration, cognitive impairment, left peroneal vein DVT 3/2021.     Today's concern is:      Facial laceration, subsequent encounter  Fall, subsequent encounter  Bronchiectasis without acute exacerbation (H)  DDD (degenerative disc disease), lumbar  Lumbar radiculopathy  Osteopenia, unspecified location  Cognitive impairment  Physical deconditioning  She is seen today to follow up after an ED visit 8/26/2022 for a mechanical fall with head strike. She slipped on the wet floor while doing laundry. CT head showed a right frontal scalp hematoma,mild to moderate diffuse cerebral atrophy and mild small vessel ischemic disease, no acute intracranial pathology. CT cervical spine showed multilevel degenerative changes and no acute fracture or subluxation. XR right tibia and fibula negative for fracture. Creatinine was 1.64. Facial laceration was repaired with 3 sutures.   Jeff Vega is present. She reports feeling fairly well. Denies headache or facial pain. Bruising has improved. Reports her chronic cough has been worse since moving to [place]. No shortness of breath or chest pain. Using Robitussin prn without much improvement. Good appetite. Continues to work with therapies. Ambulates short  distances with a walker, uses a wheelchair for longer distances outside of their apartment. Independent with toileting and dressing.       Allergies, and PMH/PSH reviewed in EPIC today    REVIEW OF SYSTEMS:  4 point ROS including Respiratory, CV, GI and , other than that noted in the HPI,  is negative    Objective:   /80   Pulse 60   Temp 98.5  F (36.9  C)   Resp 13   SpO2 96%   GENERAL APPEARANCE:  Alert, in no distress, frail appearing   ENT:  [place], oropharynx clear  EYES:  conjunctiva and lids normal  NECK:  no adenopathy or thyromegaly   RESP:  lungs clear to auscultation, no crackles or wheezes   CV:  regular rate and rhythm, no murmur, trace bilateral LE edema   ABDOMEN:  soft, non-tender, no distension, no masses  M/S:   gait slow with walker. LOPEZ with good strength. Degenerative changes of knees, no acute inflammation   SKIN:  fading facial bruising. 3 sutures mid forehead removed, laceration clean, no erythema   PSYCH:  oriented X 3, memory impaired , affect and mood normal    Recent labs in [facility] reviewed by me today.     ASSESSMENT / PLAN:  (S01.81XD) Facial laceration, subsequent encounter  (primary encounter diagnosis)  (W19.XXXD) Fall, subsequent encounter  Comment: laceration healing without signs of infection and sutures removed. Facial bruising resolving. No other injuries   Plan: monitor, continue therapies.     (J47.9) Bronchiectasis without acute exacerbation (H)  Comment: chronic cough is unchanged. Lungs clear on exam. Not on chronic pulmonary meds   Plan: Robitussin prn. CXR if cough worsens     (N18.32) Stage 3b chronic kidney disease (H)  Comment: creatinine 1. 64 is up from baseline creatinine 1.3   Plan: encourage fluids. Repeat BMP in a few weeks. Avoid nephrotoxins, renal dose meds.     (M51.36) DDD (degenerative disc disease), lumbar  (M54.16) Lumbar radiculopathy  Comment: chronic pain, poor mobility. She declines trying stronger pain meds due to potential side effects. Has  had epidural steroid injection in the past without much improvement.   MRI lumbar spine 8/8/2020 showed multilevel degenerative disc changes and height loss, most prominent at L4-L5 on the left.   Plan: continue tylenol, lidoderm patch, prn tramadol. Continue therapies.     (R41.89) Cognitive impairment  Comment: mild to moderate deficits, poor short term memory   Plan: AL staff assistance with cares, meals, activities, med admin. Family very involved and assists with laundry, shopping, finances.     (R53.81) Physical deconditioning  Comment: continues to progress in therapies   Plan: continue home PHYSICAL THERAPY/OT. Walker or wheelchair for mobility         Electronically signed by: FER Greene CNP             Sincerely,        FER Greene CNP

## 2022-09-14 PROBLEM — I82.452 ACUTE DEEP VEIN THROMBOSIS (DVT) OF LEFT PERONEAL VEIN (H): Status: RESOLVED | Noted: 2021-03-29 | Resolved: 2022-09-14

## 2022-09-15 ENCOUNTER — ASSISTED LIVING VISIT (OUTPATIENT)
Dept: GERIATRICS | Facility: CLINIC | Age: 87
End: 2022-09-15
Payer: COMMERCIAL

## 2022-09-15 VITALS
BODY MASS INDEX: 24.84 KG/M2 | OXYGEN SATURATION: 99 % | TEMPERATURE: 97.8 F | HEIGHT: 62 IN | HEART RATE: 81 BPM | SYSTOLIC BLOOD PRESSURE: 144 MMHG | RESPIRATION RATE: 16 BRPM | DIASTOLIC BLOOD PRESSURE: 71 MMHG | WEIGHT: 135 LBS

## 2022-09-15 DIAGNOSIS — M15.9 OSTEOARTHRITIS OF MULTIPLE JOINTS, UNSPECIFIED OSTEOARTHRITIS TYPE: Primary | ICD-10-CM

## 2022-09-15 DIAGNOSIS — R41.89 COGNITIVE IMPAIRMENT: ICD-10-CM

## 2022-09-15 DIAGNOSIS — M54.16 LUMBAR RADICULOPATHY: ICD-10-CM

## 2022-09-15 DIAGNOSIS — R53.81 PHYSICAL DECONDITIONING: ICD-10-CM

## 2022-09-15 DIAGNOSIS — M51.369 DDD (DEGENERATIVE DISC DISEASE), LUMBAR: ICD-10-CM

## 2022-09-15 DIAGNOSIS — M85.80 OSTEOPENIA, UNSPECIFIED LOCATION: ICD-10-CM

## 2022-09-15 RX ORDER — ACETAMINOPHEN 500 MG
1000 TABLET ORAL 2 TIMES DAILY PRN
COMMUNITY
End: 2023-05-12

## 2022-09-15 NOTE — LETTER
"    9/15/2022        RE: Grace Vega  [id number] [place] [address] GERIATRICS    Chief Complaint   Patient presents with     RECHECK     HPI:  Grace Vega is a 89 year old  (3/15/1929), who is being seen today for an episodic care visit at: [facility] ([facility]) [[id number]].   She and her  moved to this facility 6/2022 to be closer to family and for a higher level of care. Previously lived in an apartment in [place] and received care through [facility].   Medical history significant for DDD lumbar with radiculopathy, gait instability, osteoarthritis, hypothyroidism, bronchiectasis, CKD stage 3, LE edema, macular degeneration, cognitive impairment, left peroneal vein DVT 3/2021.    Today's concern is:      Osteoarthritis of multiple joints, unspecified osteoarthritis type  DDD (degenerative disc disease), lumbar  Lumbar radiculopathy  Osteopenia, unspecified location  Cognitive impairment  Physical deconditioning  She reports worsening pain of both LE, \"from my hips to my knees.\" Left hip is worse than the right, both knees are very painful. Having more difficulty  walking and getting up from a sitting position. Uses a wheelchair outside of the apartment. Recently worked with therapies. Pain is no worse after a fall 8/26/2022. Doesn't like to take tramadol or tylenol because she feels they affect her vision.     Allergies, and PMH/PSH reviewed in EPIC today.  REVIEW OF SYSTEMS:  4 point ROS including Respiratory, CV, GI and , other than that noted in the HPI,  is negative    Objective:   BP (!) 144/71   Pulse 81   Temp 97.8  F (36.6  C)   Resp 16   Ht 1.575 m (5' 2\")   Wt 61.2 kg (135 lb)   SpO2 99%   BMI 24.69 kg/m    GENERAL APPEARANCE:  Alert, in no distress, frail appearing   ENT:  [place], oropharynx clear  EYES:  conjunctiva and lids normal  NECK:  no adenopathy or " thyromegaly   RESP:  lungs clear to auscultation   CV:  regular rate and rhythm, no murmur, no LE edema   ABDOMEN:  soft, non-tender, no distension, no masses  M/S:   gait slow with walker. LOPEZ with good strength. Degenerative changes both knees, no warmth or erythema   SKIN:  no visible rashes or open areas  PSYCH:  oriented X 3, memory impaired , affect and mood normal       Recent labs in [facility] reviewed by me today.     ASSESSMENT / PLAN:  (M15.9) Osteoarthritis of multiple joints, unspecified osteoarthritis type  (primary encounter diagnosis)  (M51.36) DDD (degenerative disc disease), lumbar  (M54.16) Lumbar radiculopathy  Comment: severe pain of hips and knees, affecting her mobility and functional status.   Plan: trial of diclofenac gel to both knees. Continue lidocaine patch to low back, tylenol and tramadol prn.     Addendum 9/19/2022: Discussed with Huber TAVERAS, who will visit this week to evaluate for steroid injections. Called step son Olayinka Vega who agrees with this plan and will be in town that day.     (M85.80) Osteopenia, unspecified location  Comment: history of left wrist fracture 2019. Was on fosamax in the past.   Plan: consider adding calcium-vitamin D-discuss at next visit     (R41.89) Cognitive impairment  Comment: mild to moderate deficits with poor short term memory   Plan: AL staff assistance with cares, meals, activities, med admin     (R53.81) Physical deconditioning  Comment: completed home therapies   Plan: activity as tolerated. Walker or wheelchair for mobility.         Electronically signed by: FER Greene CNP           Sincerely,        FER Greene CNP

## 2022-09-15 NOTE — PROGRESS NOTES
"[facility] GERIATRICS    Chief Complaint   Patient presents with     RECHECK     HPI:  Grace Vega is a 89 year old  (3/15/1929), who is being seen today for an episodic care visit at: [facility] ([facility]) [[id number]].   She and her  moved to this facility 6/2022 to be closer to family and for a higher level of care. Previously lived in an apartment in [place] and received care through [facility].   Medical history significant for DDD lumbar with radiculopathy, gait instability, osteoarthritis, hypothyroidism, bronchiectasis, CKD stage 3, LE edema, macular degeneration, cognitive impairment, left peroneal vein DVT 3/2021.    Today's concern is:      Osteoarthritis of multiple joints, unspecified osteoarthritis type  DDD (degenerative disc disease), lumbar  Lumbar radiculopathy  Osteopenia, unspecified location  Cognitive impairment  Physical deconditioning  She reports worsening pain of both LE, \"from my hips to my knees.\" Left hip is worse than the right, both knees are very painful. Having more difficulty  walking and getting up from a sitting position. Uses a wheelchair outside of the apartment. Recently worked with therapies. Pain is no worse after a fall 8/26/2022. Doesn't like to take tramadol or tylenol because she feels they affect her vision.     Allergies, and PMH/PSH reviewed in [facility] today.  REVIEW OF SYSTEMS:  4 point ROS including Respiratory, CV, GI and , other than that noted in the HPI,  is negative    Objective:   BP (!) 144/71   Pulse 81   Temp 97.8  F (36.6  C)   Resp 16   Ht 1.575 m (5' 2\")   Wt 61.2 kg (135 lb)   SpO2 99%   BMI 24.69 kg/m    GENERAL APPEARANCE:  Alert, in no distress, frail appearing   ENT:  [place], oropharynx clear  EYES:  conjunctiva and lids normal  NECK:  no adenopathy or thyromegaly   RESP:  lungs clear to auscultation   CV:  regular rate and rhythm, no murmur, no LE edema   ABDOMEN:  soft, " non-tender, no distension, no masses  M/S:   gait slow with walker. LOPEZ with good strength. Degenerative changes both knees, no warmth or erythema   SKIN:  no visible rashes or open areas  PSYCH:  oriented X 3, memory impaired , affect and mood normal       Recent labs in [facility] reviewed by me today.     ASSESSMENT / PLAN:  (M15.9) Osteoarthritis of multiple joints, unspecified osteoarthritis type  (primary encounter diagnosis)  (M51.36) DDD (degenerative disc disease), lumbar  (M54.16) Lumbar radiculopathy  Comment: severe pain of hips and knees, affecting her mobility and functional status.   Plan: trial of diclofenac gel to both knees. Continue lidocaine patch to low back, tylenol and tramadol prn.     Addendum 9/19/2022: Discussed with Huber TAVERAS, who will visit this week to evaluate for steroid injections. Called step son Olayinka Vega who agrees with this plan and will be in town that day.     (M85.80) Osteopenia, unspecified location  Comment: history of left wrist fracture 2019. Was on fosamax in the past.   Plan: consider adding calcium-vitamin D-discuss at next visit     (R41.89) Cognitive impairment  Comment: mild to moderate deficits with poor short term memory   Plan: AL staff assistance with cares, meals, activities, med admin     (R53.81) Physical deconditioning  Comment: completed home therapies   Plan: activity as tolerated. Walker or wheelchair for mobility.         Electronically signed by: FER Greene CNP

## 2022-09-22 ENCOUNTER — ASSISTED LIVING VISIT (OUTPATIENT)
Dept: GERIATRICS | Facility: CLINIC | Age: 87
End: 2022-09-22

## 2022-09-22 ENCOUNTER — ASSISTED LIVING VISIT (OUTPATIENT)
Dept: GERIATRICS | Facility: CLINIC | Age: 87
End: 2022-09-22
Payer: COMMERCIAL

## 2022-09-22 VITALS
TEMPERATURE: 97.6 F | HEIGHT: 62 IN | OXYGEN SATURATION: 93 % | DIASTOLIC BLOOD PRESSURE: 80 MMHG | WEIGHT: 165 LBS | RESPIRATION RATE: 16 BRPM | BODY MASS INDEX: 30.36 KG/M2 | HEART RATE: 81 BPM | SYSTOLIC BLOOD PRESSURE: 130 MMHG

## 2022-09-22 DIAGNOSIS — M15.9 OSTEOARTHRITIS OF MULTIPLE JOINTS, UNSPECIFIED OSTEOARTHRITIS TYPE: Primary | ICD-10-CM

## 2022-09-22 DIAGNOSIS — M51.369 DDD (DEGENERATIVE DISC DISEASE), LUMBAR: Primary | ICD-10-CM

## 2022-09-22 DIAGNOSIS — M15.9 OSTEOARTHRITIS OF MULTIPLE JOINTS, UNSPECIFIED OSTEOARTHRITIS TYPE: ICD-10-CM

## 2022-09-22 DIAGNOSIS — M51.369 DDD (DEGENERATIVE DISC DISEASE), LUMBAR: ICD-10-CM

## 2022-09-22 DIAGNOSIS — R41.89 COGNITIVE IMPAIRMENT: ICD-10-CM

## 2022-09-22 DIAGNOSIS — M54.16 LUMBAR RADICULOPATHY: ICD-10-CM

## 2022-09-22 PROCEDURE — 20610 DRAIN/INJ JOINT/BURSA W/O US: CPT | Mod: 50 | Performed by: PHYSICIAN ASSISTANT

## 2022-09-22 NOTE — LETTER
"    9/22/2022        RE: Grace Vega  [facility]  [address] [address]        [facility] GERIATRICS    Chief Complaint   Patient presents with     RECHECK     HPI:  Grace Vega is a 89 year old  (3/15/1929), who is being seen today for an episodic care visit at: [facility] ([facility]) [[id number]].  She and her  moved to this facility 6/2022 to be closer to family and for a higher level of care. Previously lived in an apartment in [place] and received care through [facility].   Medical history significant for DDD lumbar with radiculopathy, gait instability, osteoarthritis, hypothyroidism, bronchiectasis, CKD stage 3, LE edema, macular degeneration, cognitive impairment, left peroneal vein DVT 3/2021.     Today's concern is:      Osteoarthritis of multiple joints, unspecified osteoarthritis type  DDD (degenerative disc disease), lumbar  Lumbar radiculopathy  Cognitive impairment  She is seen today with Huber TAVERAS for evaluation of osteoarthritis and bilateral knee pain. Reports chronic knee and left hip limits her mobility and functional status. Uses a walker within the apartment and wheelchair for all other distances. No falls. She is otherwise feeling well.   Her  and step son are present.     Allergies, and PMH/PSH reviewed in EPIC today    REVIEW OF SYSTEMS:  4 point ROS including Respiratory, CV, GI and , other than that noted in the HPI,  is negative    Objective:   /80   Pulse 81   Temp 97.6  F (36.4  C)   Resp 16   Ht 1.575 m (5' 2\")   Wt 74.8 kg (165 lb)   SpO2 93%   BMI 30.18 kg/m    GENERAL APPEARANCE:  Alert, in no distress  ENT:  [place]  EYES:  conjunctiva and lids normal  RESP:  no respiratory distress  CV:  no LE edema  M/S:   degenerative changes both knees, no acute inflammation  SKIN:  no rashes or open areas  PSYCH:  oriented X 3, memory impaired , affect and mood normal    Recent labs in " EPIC reviewed by me today.     ASSESSMENT / PLAN:  (M15.9) Osteoarthritis of multiple joints, unspecified osteoarthritis type  (primary encounter diagnosis)  (M51.36) DDD (degenerative disc disease), lumbar  (M54.16) Lumbar radiculopathy  Comment: chronic pain.   Plan: steroid injections given to both knees by Huber TAVERAS. Please refer to his note. Continue tylenol, diclofenac gel, tramadol prn. Ice prn.     (R41.89) Cognitive impairment  Comment: mild to moderate deficits, poor short term memory   Plan: AL staff assistance with cares, meals, activities, med admin        Electronically signed by: FER Greene CNP           Sincerely,        FER Greene CNP

## 2022-09-22 NOTE — PROGRESS NOTES
Ortho Nursing home visit    Grace Vega is a 89 year old female who resides at [facility]    Patient is seen today for [place] knee pain, hs of OA, has been seen by Physician in [place], who rec; increased activity. Currently, patient is using walker full time in [place]; and W/C for any distance due to pain in knees, Right is worse.  Family and NP are at this visit;      Past Medical History:   Diagnosis Date     Acquired hypothyroidism      Bilateral leg edema      Bronchiectasis (H)      DDD (degenerative disc disease), lumbar      Deep venous thrombosis (DVT) of left peroneal vein (H) 09/2021     History of basal cell carcinoma      Lumbar radiculopathy      Macular degeneration (senile) of retina      Osteopenia      Pseudophakia     right eye     Sensorineural hearing loss, bilateral      Tinnitus, bilateral      Vertigo       Past Surgical History:   Procedure Laterality Date     FOOT SURGERY       TONSILLECTOMY       WRIST GANGLION EXCISION Left 1949        Allergies   Allergen Reactions     Sulfa Drugs       There were no vitals taken for this visit.     Exam: Alert cooperative female, seated;. Allows PROM to both knees, WNL< valgus deformity bot knees, no noted effusion or swelling, pain along the joint lines, both knees; is using Volteran gell BID with some relief;    ASSESSMENT / PLAN: Discussed treatment options, including, TKA, NSAIDS< ACTIVITY< and injections of cortisone,  R&B. Patient and family wish to proceed with injections;    Procedure; Both knees prepped, and in a flexed position both knees injected into the joint space with 1 ml depo medrol, 4 ml 1% lidocaine, no complaints, F/U PRN    [id number]          [place] OPA-C With Theodore Skinner NP  [phone] Cell

## 2022-09-22 NOTE — PROGRESS NOTES
"[facility] GERIATRICS    Chief Complaint   Patient presents with     RECHECK     HPI:  Grace Vega is a 89 year old  (3/15/1929), who is being seen today for an episodic care visit at: THE [facility]) [[id number]].  She and her  moved to this facility 6/2022 to be closer to family and for a higher level of care. Previously lived in an apartment in [place] and received care through [facility].   Medical history significant for DDD lumbar with radiculopathy, gait instability, osteoarthritis, hypothyroidism, bronchiectasis, CKD stage 3, LE edema, macular degeneration, cognitive impairment, left peroneal vein DVT 3/2021.     Today's concern is:      Osteoarthritis of multiple joints, unspecified osteoarthritis type  DDD (degenerative disc disease), lumbar  Lumbar radiculopathy  Cognitive impairment  She is seen today with Huber TAVERAS for evaluation of osteoarthritis and bilateral knee pain. Reports chronic knee and left hip limits her mobility and functional status. Uses a walker within the apartment and wheelchair for all other distances. No falls. She is otherwise feeling well.   Her  and step son are present.     Allergies, and PMH/PSH reviewed in EPIC today    REVIEW OF SYSTEMS:  4 point ROS including Respiratory, CV, GI and , other than that noted in the HPI,  is negative    Objective:   /80   Pulse 81   Temp 97.6  F (36.4  C)   Resp 16   Ht 1.575 m (5' 2\")   Wt 74.8 kg (165 lb)   SpO2 93%   BMI 30.18 kg/m    GENERAL APPEARANCE:  Alert, in no distress  ENT:  [place]  EYES:  conjunctiva and lids normal  RESP:  no respiratory distress  CV:  no LE edema  M/S:   degenerative changes both knees, no acute inflammation  SKIN:  no rashes or open areas  PSYCH:  oriented X 3, memory impaired , affect and mood normal    Recent labs in EPIC reviewed by me today.     ASSESSMENT / PLAN:  (M15.9) Osteoarthritis of multiple joints, unspecified " osteoarthritis type  (primary encounter diagnosis)  (M51.36) DDD (degenerative disc disease), lumbar  (M54.16) Lumbar radiculopathy  Comment: chronic pain.   Plan: steroid injections given to both knees by Huber TAVERAS. Please refer to his note. Continue tylenol, diclofenac gel, tramadol prn. Ice prn.     (R41.89) Cognitive impairment  Comment: mild to moderate deficits, poor short term memory   Plan: AL staff assistance with cares, meals, activities, med admin        Electronically signed by: FER Greene CNP

## 2022-11-02 ENCOUNTER — LAB REQUISITION (OUTPATIENT)
Dept: LAB | Facility: CLINIC | Age: 87
End: 2022-11-02
Payer: COMMERCIAL

## 2022-11-02 ENCOUNTER — ASSISTED LIVING VISIT (OUTPATIENT)
Dept: GERIATRICS | Facility: CLINIC | Age: 87
End: 2022-11-02
Payer: COMMERCIAL

## 2022-11-02 VITALS
BODY MASS INDEX: 25.97 KG/M2 | HEART RATE: 84 BPM | TEMPERATURE: 97.9 F | DIASTOLIC BLOOD PRESSURE: 80 MMHG | WEIGHT: 142 LBS | RESPIRATION RATE: 17 BRPM | SYSTOLIC BLOOD PRESSURE: 130 MMHG

## 2022-11-02 DIAGNOSIS — M15.9 OSTEOARTHRITIS OF MULTIPLE JOINTS, UNSPECIFIED OSTEOARTHRITIS TYPE: ICD-10-CM

## 2022-11-02 DIAGNOSIS — M54.16 LUMBAR RADICULOPATHY: ICD-10-CM

## 2022-11-02 DIAGNOSIS — N18.32 STAGE 3B CHRONIC KIDNEY DISEASE (H): ICD-10-CM

## 2022-11-02 DIAGNOSIS — M51.369 DDD (DEGENERATIVE DISC DISEASE), LUMBAR: ICD-10-CM

## 2022-11-02 DIAGNOSIS — J47.9 BRONCHIECTASIS WITHOUT ACUTE EXACERBATION (H): Primary | ICD-10-CM

## 2022-11-02 DIAGNOSIS — N18.9 CHRONIC KIDNEY DISEASE, UNSPECIFIED: ICD-10-CM

## 2022-11-02 DIAGNOSIS — R41.89 COGNITIVE IMPAIRMENT: ICD-10-CM

## 2022-11-02 RX ORDER — BENZONATATE 100 MG/1
100 CAPSULE ORAL 2 TIMES DAILY
Qty: 30 CAPSULE | Refills: 1 | Status: SHIPPED | OUTPATIENT
Start: 2022-11-02 | End: 2022-11-17

## 2022-11-02 NOTE — LETTER
"    11/2/2022        RE: Grace Vega  [facility]  [address] [address]        [facility] GERIATRICS    Chief Complaint   Patient presents with     RECHECK     HPI:  Grace Vega is a 89 year old  (3/15/1929), who is being seen today for an episodic care visit at: [facility] [[id number]].   She and her  moved to this facility 6/2022 to be closer to family and for a higher level of care. Previously lived in an apartment in [place] and received care through [facility].   Medical history significant for DDD lumbar with radiculopathy, gait instability, osteoarthritis, hypothyroidism, bronchiectasis, CKD stage 3, LE edema, macular degeneration, cognitive impairment, left peroneal vein DVT 3/2021.   She was seen in the ED 8/26/2022 for a mechanical fall with head strike. She slipped on the wet floor while doing laundry. CT head showed a right frontal scalp hematoma,mild to moderate diffuse cerebral atrophy and mild small vessel ischemic disease, no acute intracranial pathology. CT cervical spine showed multilevel degenerative changes and no acute fracture or subluxation. Creatinine was 1.64. Facial laceration was repaired with 3 sutures.     Today's concern is:      Bronchiectasis without acute exacerbation (H)  Stage 3b chronic kidney disease (H)  DDD (degenerative disc disease), lumbar  Osteoarthritis of multiple joints, unspecified osteoarthritis type  Lumbar radiculopathy  Cognitive impairment   She reports feeling well. She received bilateral knee steroid injections 9/22/2022 by Huber TAVERAS and feels they were helpful. \"I'm not in shrieking pain.\" She asks about using CBD gummies for pain. Reports her chronic cough is no worse, but embarrasses her during meals in the dining room. Cough is not related to eating or drinking. Denies shortness of breast or chest pain. Ambulates about the apartment with a walker, uses a " wheelchair for longer distances. Independent with toileting and dressing.       Allergies, and PMH/PSH reviewed in EPIC today    REVIEW OF SYSTEMS:  4 point ROS including Respiratory, CV, GI and , other than that noted in the HPI,  is negative    Objective:   /80   Pulse 84   Temp 97.9  F (36.6  C)   Resp 17   Wt 64.4 kg (142 lb)   BMI 25.97 kg/m    GENERAL APPEARANCE:  Alert, in no distress   ENT:  [place], oropharynx clear  EYES:  conjunctiva and lids normal  NECK:  no adenopathy or thyromegaly   RESP:  lungs clear to auscultation, no crackles or wheezes   CV:  regular rate and rhythm, no murmur, trace bilateral LE edema   ABDOMEN:  soft, non-tender, no distension, no masses  M/S:   gait slow with walker. LOPEZ with good strength. Degenerative changes of knees, no acute inflammation   SKIN:  no rashes or open areas   PSYCH:  oriented X 3, memory impaired , affect and mood normal    Recent labs in [facility] reviewed by me today.     ASSESSMENT / PLAN:  (J47.9) Bronchiectasis without acute exacerbation (H)  (primary encounter diagnosis)  Comment: no significant change in her chronic cough, but is bothersome during meals. She has tested negative for COVID and does not appear ill. We discussed CBD gummies, potential side effects and benefits. AL staff not able to store or administer, so family would need to supply and monitor use.   Plan: trial of tessalon perles. Potential side effects discussed. Monitor symptoms.   Discussed with step son Olayinka Vega, who will follow up with other family members re: potential use of CBD.     (N18.32) Stage 3b chronic kidney disease (H)  Comment: creatinine 1.64 on 8/26/2022, up from baseline 1.3  Plan: BMP. Avoid nephrotoxins. Encourage fluids     (M51.36) DDD (degenerative disc disease), lumbar  (M15.9) Osteoarthritis of multiple joints, unspecified osteoarthritis type  (M54.16) Lumbar radiculopathy  Comment: some improvement in knee pain after steroid injections 9/22/2022  Diclofenac has also been helpful   Plan: continue diclofenac gel to knees, lidocaine patch to back, tylenol, tramadol prn     (R41.89) Cognitive impairment  Comment: mild to moderate deficits, poor short term memory   Plan: AL staff assistance with cares, meals, activities, med admin             Electronically signed by: FER Greene CNP             Sincerely,        FER Greene CNP

## 2022-11-02 NOTE — PROGRESS NOTES
"[facility] GERIATRICS    Chief Complaint   Patient presents with     RECHECK     HPI:  Grace Vega is a 89 year old  (3/15/1929), who is being seen today for an episodic care visit at: THE [facility]) [[id number]].   She and her  moved to this facility 6/2022 to be closer to family and for a higher level of care. Previously lived in an apartment in [place] and received care through [facility].   Medical history significant for DDD lumbar with radiculopathy, gait instability, osteoarthritis, hypothyroidism, bronchiectasis, CKD stage 3, LE edema, macular degeneration, cognitive impairment, left peroneal vein DVT 3/2021.   She was seen in the ED 8/26/2022 for a mechanical fall with head strike. She slipped on the wet floor while doing laundry. CT head showed a right frontal scalp hematoma,mild to moderate diffuse cerebral atrophy and mild small vessel ischemic disease, no acute intracranial pathology. CT cervical spine showed multilevel degenerative changes and no acute fracture or subluxation. Creatinine was 1.64. Facial laceration was repaired with 3 sutures.     Today's concern is:      Bronchiectasis without acute exacerbation (H)  Stage 3b chronic kidney disease (H)  DDD (degenerative disc disease), lumbar  Osteoarthritis of multiple joints, unspecified osteoarthritis type  Lumbar radiculopathy  Cognitive impairment   She reports feeling well. She received bilateral knee steroid injections 9/22/2022 by Huber TAVERAS and feels they were helpful. \"I'm not in shrieking pain.\" She asks about using CBD gummies for pain. Reports her chronic cough is no worse, but embarrasses her during meals in the dining room. Cough is not related to eating or drinking. Denies shortness of breast or chest pain. Ambulates about the apartment with a walker, uses a wheelchair for longer distances. Independent with toileting and dressing.       Allergies, and PMH/PSH reviewed in EPIC " today    REVIEW OF SYSTEMS:  4 point ROS including Respiratory, CV, GI and , other than that noted in the HPI,  is negative    Objective:   /80   Pulse 84   Temp 97.9  F (36.6  C)   Resp 17   Wt 64.4 kg (142 lb)   BMI 25.97 kg/m    GENERAL APPEARANCE:  Alert, in no distress   ENT:  [place], oropharynx clear  EYES:  conjunctiva and lids normal  NECK:  no adenopathy or thyromegaly   RESP:  lungs clear to auscultation, no crackles or wheezes   CV:  regular rate and rhythm, no murmur, trace bilateral LE edema   ABDOMEN:  soft, non-tender, no distension, no masses  M/S:   gait slow with walker. LOPEZ with good strength. Degenerative changes of knees, no acute inflammation   SKIN:  no rashes or open areas   PSYCH:  oriented X 3, memory impaired , affect and mood normal    Recent labs in [facility] reviewed by me today.     ASSESSMENT / PLAN:  (J47.9) Bronchiectasis without acute exacerbation (H)  (primary encounter diagnosis)  Comment: no significant change in her chronic cough, but is bothersome during meals. She has tested negative for COVID and does not appear ill. We discussed CBD gummies, potential side effects and benefits. AL staff not able to store or administer, so family would need to supply and monitor use.   Plan: trial of tessalon perles. Potential side effects discussed. Monitor symptoms.   Discussed with step son Olayinka Vega, who will follow up with other family members re: potential use of CBD.     (N18.32) Stage 3b chronic kidney disease (H)  Comment: creatinine 1.64 on 8/26/2022, up from baseline 1.3  Plan: BMP. Avoid nephrotoxins. Encourage fluids     (M51.36) DDD (degenerative disc disease), lumbar  (M15.9) Osteoarthritis of multiple joints, unspecified osteoarthritis type  (M54.16) Lumbar radiculopathy  Comment: some improvement in knee pain after steroid injections 9/22/2022 Diclofenac has also been helpful   Plan: continue diclofenac gel to knees, lidocaine patch to back, tylenol, tramadol prn      (R4.40) Cognitive impairment  Comment: mild to moderate deficits, poor short term memory   Plan: AL staff assistance with cares, meals, activities, med admin             Electronically signed by: FER Greene CNP

## 2022-11-03 LAB
ANION GAP SERPL CALCULATED.3IONS-SCNC: 14 MMOL/L (ref 7–15)
BUN SERPL-MCNC: 20.2 MG/DL (ref 8–23)
CALCIUM SERPL-MCNC: 9.5 MG/DL (ref 8.2–9.6)
CHLORIDE SERPL-SCNC: 107 MMOL/L (ref 98–107)
CREAT SERPL-MCNC: 1.38 MG/DL (ref 0.51–0.95)
DEPRECATED HCO3 PLAS-SCNC: 20 MMOL/L (ref 22–29)
GFR SERPL CREATININE-BSD FRML MDRD: 36 ML/MIN/1.73M2
GLUCOSE SERPL-MCNC: 102 MG/DL (ref 70–99)
POTASSIUM SERPL-SCNC: 4.3 MMOL/L (ref 3.4–5.3)
SODIUM SERPL-SCNC: 141 MMOL/L (ref 136–145)

## 2022-11-03 PROCEDURE — 80048 BASIC METABOLIC PNL TOTAL CA: CPT | Mod: ORL | Performed by: NURSE PRACTITIONER

## 2022-11-03 PROCEDURE — 36415 COLL VENOUS BLD VENIPUNCTURE: CPT | Mod: ORL | Performed by: NURSE PRACTITIONER

## 2022-11-03 PROCEDURE — P9603 ONE-WAY ALLOW PRORATED MILES: HCPCS | Mod: ORL | Performed by: NURSE PRACTITIONER

## 2022-11-17 DIAGNOSIS — J47.9 BRONCHIECTASIS WITHOUT ACUTE EXACERBATION (H): ICD-10-CM

## 2022-11-17 RX ORDER — BENZONATATE 100 MG/1
100 CAPSULE ORAL 2 TIMES DAILY
Qty: 60 CAPSULE | Refills: 3 | Status: SHIPPED | OUTPATIENT
Start: 2022-11-17 | End: 2023-03-16

## 2022-12-22 ENCOUNTER — ASSISTED LIVING VISIT (OUTPATIENT)
Dept: GERIATRICS | Facility: CLINIC | Age: 87
End: 2022-12-22
Payer: COMMERCIAL

## 2022-12-22 VITALS
HEART RATE: 77 BPM | TEMPERATURE: 98.1 F | WEIGHT: 141.2 LBS | BODY MASS INDEX: 25.83 KG/M2 | RESPIRATION RATE: 17 BRPM | DIASTOLIC BLOOD PRESSURE: 76 MMHG | SYSTOLIC BLOOD PRESSURE: 137 MMHG

## 2022-12-22 DIAGNOSIS — J47.9 BRONCHIECTASIS WITHOUT ACUTE EXACERBATION (H): Primary | ICD-10-CM

## 2022-12-22 DIAGNOSIS — M54.16 LUMBAR RADICULOPATHY: ICD-10-CM

## 2022-12-22 DIAGNOSIS — N18.32 STAGE 3B CHRONIC KIDNEY DISEASE (H): ICD-10-CM

## 2022-12-22 DIAGNOSIS — R41.89 COGNITIVE IMPAIRMENT: ICD-10-CM

## 2022-12-22 DIAGNOSIS — M15.9 OSTEOARTHRITIS OF MULTIPLE JOINTS, UNSPECIFIED OSTEOARTHRITIS TYPE: ICD-10-CM

## 2022-12-22 DIAGNOSIS — M51.369 DDD (DEGENERATIVE DISC DISEASE), LUMBAR: ICD-10-CM

## 2022-12-22 RX ORDER — GUAIFENESIN 200 MG/10ML
10 LIQUID ORAL EVERY 4 HOURS PRN
Qty: 236 ML | Refills: 3 | Status: SHIPPED | OUTPATIENT
Start: 2022-12-22 | End: 2023-03-14

## 2022-12-22 NOTE — LETTER
12/22/2022        RE: Grace Vega  [facility]  [address] [id number]        [facility] GERIATRICS    Chief Complaint   Patient presents with     RECHECK     HPI:  Grace Vega is a 89 year old  (3/15/1929), who is being seen today for an episodic care visit at: THE [facility] ([facility]) [[id number]].   She and her  moved to this facility 6/2022 to be closer to family and for a higher level of care. Previously lived in an apartment in [place] and received care through [facility].   Medical history significant for DDD lumbar with radiculopathy, gait instability, osteoarthritis, hypothyroidism, bronchiectasis, CKD stage 3, LE edema, macular degeneration, cognitive impairment, left peroneal vein DVT 3/2021.   She was seen in the ED 8/26/2022 for a mechanical fall with head strike. She slipped on the wet floor while doing laundry. CT head showed a right frontal scalp hematoma,mild to moderate diffuse cerebral atrophy and mild small vessel ischemic disease, no acute intracranial pathology. CT cervical spine showed multilevel degenerative changes and no acute fracture or subluxation. Creatinine was 1.64. Facial laceration was repaired with 3 sutures.     Today's concern is:      Bronchiectasis without acute exacerbation (H)  DDD (degenerative disc disease), lumbar  Lumbar radiculopathy  Osteoarthritis of multiple joints, unspecified osteoarthritis type  Stage 3b chronic kidney disease (H)  Cognitive impairment   She is seen today after staff reported worsening of chronic cough. Patient has had this for years and work up in [place] was consistent with bronchiectasis. She is a fairly poor historian. Reports the cough is the same. She told staff that she didn't think tessalon was helping, but today she thinks it has helped. The cough wakes her at night. Denies shortness of breath. Denies feeling ill. Afebrile and no hypoxia. She has tested  negative for COVID-19 multiple times. Reports severe knee pain prevents her from being more active. No falls. Ambulates with a walker in their apartment and uses a wheelchair for longer distances. Independent with toileting and dressing, receives assistance with bathing.       Allergies, and PMH/PSH reviewed in EPIC today    REVIEW OF SYSTEMS:  4 point ROS including Respiratory, CV, GI and , other than that noted in the HPI,  is negative    Objective:   /76   Pulse 77   Temp 98.1  F (36.7  C)   Resp 17   Wt 64 kg (141 lb 3.2 oz)   BMI 25.83 kg/m    GENERAL APPEARANCE:  Alert, in no distress   ENT:  [place], oropharynx clear  EYES:  conjunctiva and lids normal  NECK:  no adenopathy or thyromegaly   RESP:  lungs clear to auscultation, no crackles or wheezes   CV:  regular rate and rhythm, no murmur, no edema   ABDOMEN:  soft, non-tender, no distension, no masses  M/S:   gait slow with walker. LOPEZ.  Severe degenerative changes of knees, no acute inflammation   SKIN:  no rashes or open areas   PSYCH:  oriented X 2-3, insight and memory impaired , affect and mood normal    Recent labs in [facility] reviewed by me today.     ASSESSMENT / PLAN:  (J47.9) Bronchiectasis without acute exacerbation (H)  (primary encounter diagnosis)  Comment: chronic and appears unchanged. No s/s of acute illness, lungs clear on exam   Plan:Robitussin at HS and prn, continue tessalon.   Discussed with her niurkaon Olayinka Vega, who agrees with plan of care.     (M51.36) DDD (degenerative disc disease), lumbar  (M54.16) Lumbar radiculopathy  (M15.9) Osteoarthritis of multiple joints, unspecified osteoarthritis type  Comment: severe arthritis of both knees, some benefit from steroid injection 9/2022  Plan: continue tylenol, diclofenac gel. Tramadol is available prn and rarely used. Repeat steroid injections 1/2023 per Huber MENDEZ     (N18.32) Stage 3b chronic kidney disease (H)  Comment: creatinine 1.38 on 11/3/2022, back to baseline    Plan: encourage fluids. Follow BMP. Avoid nephrotoxins, no NSAIDS    (R41.89) Cognitive impairment  Comment: very poor short term memory   Plan: AL staff assistance with cares, meals, activities, med admin         Electronically signed by: FER Greene CNP             Sincerely,        FER Greene CNP

## 2022-12-22 NOTE — PROGRESS NOTES
[facility] GERIATRICS    Chief Complaint   Patient presents with     RECHECK     HPI:  Grace Vega is a 89 year old  (3/15/1929), who is being seen today for an episodic care visit at: THE [facility]) [[id number]].   She and her  moved to this facility 6/2022 to be closer to family and for a higher level of care. Previously lived in an apartment in [place] and received care through [facility].   Medical history significant for DDD lumbar with radiculopathy, gait instability, osteoarthritis, hypothyroidism, bronchiectasis, CKD stage 3, LE edema, macular degeneration, cognitive impairment, left peroneal vein DVT 3/2021.   She was seen in the ED 8/26/2022 for a mechanical fall with head strike. She slipped on the wet floor while doing laundry. CT head showed a right frontal scalp hematoma,mild to moderate diffuse cerebral atrophy and mild small vessel ischemic disease, no acute intracranial pathology. CT cervical spine showed multilevel degenerative changes and no acute fracture or subluxation. Creatinine was 1.64. Facial laceration was repaired with 3 sutures.     Today's concern is:      Bronchiectasis without acute exacerbation (H)  DDD (degenerative disc disease), lumbar  Lumbar radiculopathy  Osteoarthritis of multiple joints, unspecified osteoarthritis type  Stage 3b chronic kidney disease (H)  Cognitive impairment   She is seen today after staff reported worsening of chronic cough. Patient has had this for years and work up in [place] was consistent with bronchiectasis. She is a fairly poor historian. Reports the cough is the same. She told staff that she didn't think tessalon was helping, but today she thinks it has helped. The cough wakes her at night. Denies shortness of breath. Denies feeling ill. Afebrile and no hypoxia. She has tested negative for COVID-19 multiple times. Reports severe knee pain prevents her from being more active. No falls.  Ambulates with a walker in their apartment and uses a wheelchair for longer distances. Independent with toileting and dressing, receives assistance with bathing.       Allergies, and PMH/PSH reviewed in EPIC today    REVIEW OF SYSTEMS:  4 point ROS including Respiratory, CV, GI and , other than that noted in the HPI,  is negative    Objective:   /76   Pulse 77   Temp 98.1  F (36.7  C)   Resp 17   Wt 64 kg (141 lb 3.2 oz)   BMI 25.83 kg/m    GENERAL APPEARANCE:  Alert, in no distress   ENT:  [place], oropharynx clear  EYES:  conjunctiva and lids normal  NECK:  no adenopathy or thyromegaly   RESP:  lungs clear to auscultation, no crackles or wheezes   CV:  regular rate and rhythm, no murmur, no edema   ABDOMEN:  soft, non-tender, no distension, no masses  M/S:   gait slow with walker. LOPEZ.  Severe degenerative changes of knees, no acute inflammation   SKIN:  no rashes or open areas   PSYCH:  oriented X 2-3, insight and memory impaired , affect and mood normal    Recent labs in [facility] reviewed by me today.     ASSESSMENT / PLAN:  (J47.9) Bronchiectasis without acute exacerbation (H)  (primary encounter diagnosis)  Comment: chronic and appears unchanged. No s/s of acute illness, lungs clear on exam   Plan:Robitussin at HS and prn, continue tessalon.   Discussed with her stepson Olayinka Gary, who agrees with plan of care.     (M51.36) DDD (degenerative disc disease), lumbar  (M54.16) Lumbar radiculopathy  (M15.9) Osteoarthritis of multiple joints, unspecified osteoarthritis type  Comment: severe arthritis of both knees, some benefit from steroid injection 9/2022  Plan: continue tylenol, diclofenac gel. Tramadol is available prn and rarely used. Repeat steroid injections 1/2023 per Huber MENDEZ     (N18.32) Stage 3b chronic kidney disease (H)  Comment: creatinine 1.38 on 11/3/2022, back to baseline   Plan: encourage fluids. Follow BMP. Avoid nephrotoxins, no NSAIDS    (R41.89) Cognitive impairment  Comment: very  poor short term memory   Plan: AL staff assistance with cares, meals, activities, med admin         Electronically signed by: FER Greene CNP

## 2022-12-26 ENCOUNTER — ASSISTED LIVING VISIT (OUTPATIENT)
Dept: GERIATRICS | Facility: CLINIC | Age: 87
End: 2022-12-26
Payer: COMMERCIAL

## 2022-12-26 DIAGNOSIS — M15.9 OSTEOARTHRITIS OF MULTIPLE JOINTS, UNSPECIFIED OSTEOARTHRITIS TYPE: Primary | ICD-10-CM

## 2022-12-26 NOTE — PROGRESS NOTES
Ortho Nursing home visit    Grace Vega is a 89 year old female who resides at [facility]    Patient is seen today for Ramirez knee pain, hs of OA, and has had injections to both knees in the past, steroid. With some relief of symptoms, Now 3 months from last injection.I called patient to discuss her pain and offer injection prior to todays visit,      Past Medical History:   Diagnosis Date     Acquired hypothyroidism      Bilateral leg edema      Bronchiectasis (H)      DDD (degenerative disc disease), lumbar      Deep venous thrombosis (DVT) of left peroneal vein (H) 09/2021     History of basal cell carcinoma      Lumbar radiculopathy      Macular degeneration (senile) of retina      Osteopenia      Pseudophakia     right eye     Sensorineural hearing loss, bilateral      Tinnitus, bilateral      Vertigo       Past Surgical History:   Procedure Laterality Date     FOOT SURGERY       TONSILLECTOMY       WRIST GANGLION EXCISION Left 1949        Allergies   Allergen Reactions     Sulfa Drugs       There were no vitals taken for this visit.     Exam: Seen on-site with , patient allows limited PROM to both knee today due to pain,     Right knee-20/90 lig intact, moderate crepitus with ROM. Pain medial .lateral  Joint space;  Left knee-10/60 no noted effusion or swelling, pain to PF/ medial. Lateral joints,      X-rays not done today,    ASSESSMENT / PLAN: after consent; both knees prepped, injected medial joints, ramirez knees, with 1 ml depo medrol, 4 ml 1% lidocaine, tolerated well, no complaints, Plan: F/U PRN    [id number]          Bal[place] OPA-C  [phone] Cell

## 2023-01-05 ENCOUNTER — ASSISTED LIVING VISIT (OUTPATIENT)
Dept: GERIATRICS | Facility: CLINIC | Age: 88
End: 2023-01-05
Payer: COMMERCIAL

## 2023-01-05 VITALS
TEMPERATURE: 98.1 F | DIASTOLIC BLOOD PRESSURE: 89 MMHG | HEART RATE: 90 BPM | SYSTOLIC BLOOD PRESSURE: 132 MMHG | RESPIRATION RATE: 16 BRPM | WEIGHT: 143.4 LBS | BODY MASS INDEX: 26.23 KG/M2

## 2023-01-05 DIAGNOSIS — M54.16 LUMBAR RADICULOPATHY: ICD-10-CM

## 2023-01-05 DIAGNOSIS — N18.32 STAGE 3B CHRONIC KIDNEY DISEASE (H): ICD-10-CM

## 2023-01-05 DIAGNOSIS — R41.89 COGNITIVE IMPAIRMENT: ICD-10-CM

## 2023-01-05 DIAGNOSIS — M51.369 DDD (DEGENERATIVE DISC DISEASE), LUMBAR: ICD-10-CM

## 2023-01-05 DIAGNOSIS — J47.9 BRONCHIECTASIS WITHOUT ACUTE EXACERBATION (H): Primary | ICD-10-CM

## 2023-01-05 DIAGNOSIS — M15.9 OSTEOARTHRITIS OF MULTIPLE JOINTS, UNSPECIFIED OSTEOARTHRITIS TYPE: ICD-10-CM

## 2023-01-05 PROCEDURE — 99349 HOME/RES VST EST MOD MDM 40: CPT | Performed by: NURSE PRACTITIONER

## 2023-01-05 NOTE — LETTER
1/5/2023        RE: Grace Vega  [facility]  [address] [address]        [facility] GERIATRICS    Chief Complaint   Patient presents with     RECHECK     HPI:  Grace Vega is a 89 year old  (3/15/1929), who is being seen today for an episodic care visit at: THE [facility] ([facility]) [[id number]].   She and her  moved to this facility 6/2022 to be closer to family and for a higher level of care. Previously lived in an apartment in [place] and received care through [facility].   Medical history significant for DDD lumbar with radiculopathy, gait instability, osteoarthritis, hypothyroidism, bronchiectasis, CKD stage 3, LE edema, macular degeneration, cognitive impairment, left peroneal vein DVT 3/2021.   She was seen in the ED 8/26/2022 for a mechanical fall with head strike. She slipped on the wet floor while doing laundry. CT head showed a right frontal scalp hematoma,mild to moderate diffuse cerebral atrophy and mild small vessel ischemic disease, no acute intracranial pathology. CT cervical spine showed multilevel degenerative changes and no acute fracture or subluxation. Creatinine was 1.64. Facial laceration was repaired with 3 sutures.     Today's concern is:      Bronchiectasis without acute exacerbation (H)  Stage 3b chronic kidney disease (H)  Osteoarthritis of multiple joints, unspecified osteoarthritis type  DDD (degenerative disc disease), lumbar  Lumbar radiculopathy  Cognitive impairment   She is a fair historian. Reports her chronic cough has been better recently and she thinks tessalon and Robitussin at  have been helpful. No shortness of breath or chest pain. Feeling well. Good appetite. She had bilateral knee injections by Huber TAVERAS 12/26/2022 with improvement. She wants to start ambulating to the dining room alone with her walker, but agrees it's a long distance and her family doesn't think it's a good  idea. She is currently taken to the dining room in a wheelchair. Ambulates with a walker in their apartment. Receives assistance with bathing and med admin. Independent with toileting and dressing.       Allergies, and PMH/PSH reviewed in EPIC today    REVIEW OF SYSTEMS:  4 point ROS including Respiratory, CV, GI and , other than that noted in the HPI,  is negative    Objective:   /89   Pulse 90   Temp 98.1  F (36.7  C)   Resp 16   Wt 65 kg (143 lb 6.4 oz)   BMI 26.23 kg/m    GENERAL APPEARANCE:  Alert, in no distress   ENT:  [place], oropharynx clear  EYES:  conjunctiva and lids normal  NECK:  no adenopathy or thyromegaly   RESP:  lungs clear to auscultation   CV:  regular rate and rhythm, no murmur, no edema   ABDOMEN:  soft, non-tender, no distension, no masses  M/S:   gait slow with walker. LOPEZ.  Severe degenerative changes of knees, no acute inflammation   SKIN:  no rashes or open areas   PSYCH:  oriented X 2-3, insight and memory impaired , affect and mood normal    Recent labs in [facility] reviewed by me today.     ASSESSMENT / PLAN:  (J47.9) Bronchiectasis without acute exacerbation (H)  (primary encounter diagnosis)  Comment: chronic, some improvement in cough. No s/s of acute illness   Plan: continue tessalon, Robitussin     (N18.32) Stage 3b chronic kidney disease (H)  Comment: creatinine 1.38 11/3/2022, baseline   Plan: follow BMP. Avoid nephrotoxins.     (M15.9) Osteoarthritis of multiple joints, unspecified osteoarthritis type  (M51.36) DDD (degenerative disc disease), lumbar  (M54.16) Lumbar radiculopathy  Comment: knee pain improved after steroid injection 12/26/2022. High fall risk and recommend she continue to use the wheelchair for distances outside their apartment   Plan: continue tylenol, diclofenac gel, prn tramadol. Refer to Huber TAVERAS for injections.     (R41.89) Cognitive impairment  Comment: mild deficits, poor short term memory   Plan: AL staff assistance with cares, meals,  activities, med admin         Electronically signed by: FER Greene CNP             Sincerely,        FER Greene CNP

## 2023-01-05 NOTE — PROGRESS NOTES
[facility] GERIATRICS    Chief Complaint   Patient presents with     RECHECK     HPI:  Grace Vega is a 89 year old  (3/15/1929), who is being seen today for an episodic care visit at: THE [facility]) [[id number]].   She and her  moved to this facility 6/2022 to be closer to family and for a higher level of care. Previously lived in an apartment in [place] and received care through [facility].   Medical history significant for DDD lumbar with radiculopathy, gait instability, osteoarthritis, hypothyroidism, bronchiectasis, CKD stage 3, LE edema, macular degeneration, cognitive impairment, left peroneal vein DVT 3/2021.   She was seen in the ED 8/26/2022 for a mechanical fall with head strike. She slipped on the wet floor while doing laundry. CT head showed a right frontal scalp hematoma,mild to moderate diffuse cerebral atrophy and mild small vessel ischemic disease, no acute intracranial pathology. CT cervical spine showed multilevel degenerative changes and no acute fracture or subluxation. Creatinine was 1.64. Facial laceration was repaired with 3 sutures.     Today's concern is:      Bronchiectasis without acute exacerbation (H)  Stage 3b chronic kidney disease (H)  Osteoarthritis of multiple joints, unspecified osteoarthritis type  DDD (degenerative disc disease), lumbar  Lumbar radiculopathy  Cognitive impairment   She is a fair historian. Reports her chronic cough has been better recently and she thinks tessalon and Robitussin at  have been helpful. No shortness of breath or chest pain. Feeling well. Good appetite. She had bilateral knee injections by Huber TAVERAS 12/26/2022 with improvement. She wants to start ambulating to the dining room alone with her walker, but agrees it's a long distance and her family doesn't think it's a good idea. She is currently taken to the dining room in a wheelchair. Ambulates with a walker in their apartment. Receives  assistance with bathing and med admin. Independent with toileting and dressing.       Allergies, and PMH/PSH reviewed in EPIC today    REVIEW OF SYSTEMS:  4 point ROS including Respiratory, CV, GI and , other than that noted in the HPI,  is negative    Objective:   /89   Pulse 90   Temp 98.1  F (36.7  C)   Resp 16   Wt 65 kg (143 lb 6.4 oz)   BMI 26.23 kg/m    GENERAL APPEARANCE:  Alert, in no distress   ENT:  [place], oropharynx clear  EYES:  conjunctiva and lids normal  NECK:  no adenopathy or thyromegaly   RESP:  lungs clear to auscultation   CV:  regular rate and rhythm, no murmur, no edema   ABDOMEN:  soft, non-tender, no distension, no masses  M/S:   gait slow with walker. LOPEZ.  Severe degenerative changes of knees, no acute inflammation   SKIN:  no rashes or open areas   PSYCH:  oriented X 2-3, insight and memory impaired , affect and mood normal    Recent labs in [facility] reviewed by me today.     ASSESSMENT / PLAN:  (J47.9) Bronchiectasis without acute exacerbation (H)  (primary encounter diagnosis)  Comment: chronic, some improvement in cough. No s/s of acute illness   Plan: continue tessalon, Robitussin     (N18.32) Stage 3b chronic kidney disease (H)  Comment: creatinine 1.38 11/3/2022, baseline   Plan: follow BMP. Avoid nephrotoxins.     (M15.9) Osteoarthritis of multiple joints, unspecified osteoarthritis type  (M51.36) DDD (degenerative disc disease), lumbar  (M54.16) Lumbar radiculopathy  Comment: knee pain improved after steroid injection 12/26/2022. High fall risk and recommend she continue to use the wheelchair for distances outside their apartment   Plan: continue tylenol, diclofenac gel, prn tramadol. Refer to Huber TAVERAS for injections.     (R41.89) Cognitive impairment  Comment: mild deficits, poor short term memory   Plan: AL staff assistance with cares, meals, activities, med admin         Electronically signed by: FER Greene CNP

## 2023-01-19 PROBLEM — N18.32 STAGE 3B CHRONIC KIDNEY DISEASE (H): Status: ACTIVE | Noted: 2023-01-19

## 2023-03-01 VITALS
RESPIRATION RATE: 17 BRPM | DIASTOLIC BLOOD PRESSURE: 62 MMHG | TEMPERATURE: 97.5 F | SYSTOLIC BLOOD PRESSURE: 100 MMHG | BODY MASS INDEX: 26.41 KG/M2 | WEIGHT: 144.4 LBS | HEART RATE: 85 BPM

## 2023-03-01 NOTE — PROGRESS NOTES
"[facility] GERIATRICS    Chief Complaint   Patient presents with     RECHECK     HPI:  Grace Vega is a 89 year old  (3/15/1929), who is being seen today for an episodic care visit at: THE [facility]) [[id number]].   She and her  moved to this facility 6/2022 to be closer to family and for a higher level of care. Previously lived in an apartment in [place] and received care through [facility].   Medical history significant for DDD lumbar with radiculopathy, gait instability, osteoarthritis, hypothyroidism, bronchiectasis, CKD stage 3, LE edema, macular degeneration, cognitive impairment, left peroneal vein DVT 3/2021.   She was seen in the ED 8/26/2022 for a mechanical fall with head strike. She slipped on the wet floor while doing laundry. CT head showed a right frontal scalp hematoma,mild to moderate diffuse cerebral atrophy and mild small vessel ischemic disease, no acute intracranial pathology. CT cervical spine showed multilevel degenerative changes and no acute fracture or subluxation. Creatinine was 1.64. Facial laceration was repaired with 3 sutures.     Today's concern is:      Bronchiectasis without acute exacerbation (H)  Osteoarthritis of multiple joints, unspecified osteoarthritis type  DDD (degenerative disc disease), lumbar  Lumbar radiculopathy  Stage 3b chronic kidney disease (H)  Cognitive impairment   She is seen today to follow up on chronic cough and pain. Reports more frequent coughing recently, which embarrasses her during meals and keeps her awake at night. \"Can't anything be done?\" Denies shortness of breath or chest pain.Afebrile and no hypoxia. Reports knee pain is \"the same.\" She asks if ambulating more would help the pain, and wants to walk to the dining room. She is up with a walker within the apartment, but is taken to the dining room downstairs in a wheelchair due to the distance and her high fall risk. No falls. Independent with " dressing and toileting. Has been declining staff assistance with bathing.       Allergies, and PMH/PSH reviewed in EPIC today    REVIEW OF SYSTEMS:  4 point ROS including Respiratory, CV, GI and , other than that noted in the HPI,  is negative    Objective:   /62   Pulse 85   Temp 97.5  F (36.4  C)   Resp 17   Wt 65.5 kg (144 lb 6.4 oz)   BMI 26.41 kg/m    GENERAL APPEARANCE:  Alert, in no distress   ENT:  [place], oropharynx clear  EYES:  conjunctiva and lids normal  NECK:  no adenopathy or thyromegaly   RESP:  lungs clear to auscultation, no crackles or wheezes  CV:  regular rate and rhythm, no murmur, no edema   ABDOMEN:  soft, non-tender, no distension, no masses  M/S:   gait slow with walker. LOPEZ.  Severe degenerative changes of knees, no acute inflammation   SKIN:  no rashes or open areas   PSYCH:  oriented X 2-3, insight and memory impaired , affect and mood normal    Recent labs in [facility] reviewed by me today.       ASSESSMENT / PLAN:  (J47.9) Bronchiectasis without acute exacerbation (H)  (primary encounter diagnosis)  Comment: chronic. More frequent cough the past several days-possible acute bronchitis although she does not appear ill.   Plan: trial of azithromycin. Continue tessalon, robitussin. Monitor symptoms   Discussed with macario Vega, who agrees with plan of care.     (M15.9) Osteoarthritis of multiple joints, unspecified osteoarthritis type  (M51.36) DDD (degenerative disc disease), lumbar  (M54.16) Lumbar radiculopathy  Comment: knee pain improved with steroid injection 12/26/2022.   Plan: continue tylenol, diclofenac gel, tramadol prn. Steroid injections prn.     (N18.32) Stage 3b chronic kidney disease (H)  Comment: creatinine 1.38 on 11/3/2022, baseline   Plan: avoid nephrotoxins. Renal dose meds. Follow BMP    (R41.89) Cognitive impairment  Comment: mild deficits. Cognition and functional status are at baseline   Plan: [place] staff assistance with cares, meals,activities, med admin          Electronically signed by: FER Greene CNP

## 2023-03-02 ENCOUNTER — ASSISTED LIVING VISIT (OUTPATIENT)
Dept: GERIATRICS | Facility: CLINIC | Age: 88
End: 2023-03-02
Payer: COMMERCIAL

## 2023-03-02 DIAGNOSIS — R41.89 COGNITIVE IMPAIRMENT: ICD-10-CM

## 2023-03-02 DIAGNOSIS — N18.32 STAGE 3B CHRONIC KIDNEY DISEASE (H): ICD-10-CM

## 2023-03-02 DIAGNOSIS — J47.9 BRONCHIECTASIS WITHOUT ACUTE EXACERBATION (H): Primary | ICD-10-CM

## 2023-03-02 DIAGNOSIS — M15.9 OSTEOARTHRITIS OF MULTIPLE JOINTS, UNSPECIFIED OSTEOARTHRITIS TYPE: ICD-10-CM

## 2023-03-02 DIAGNOSIS — M51.369 DDD (DEGENERATIVE DISC DISEASE), LUMBAR: ICD-10-CM

## 2023-03-02 DIAGNOSIS — M54.16 LUMBAR RADICULOPATHY: ICD-10-CM

## 2023-03-02 PROCEDURE — 99349 HOME/RES VST EST MOD MDM 40: CPT | Performed by: NURSE PRACTITIONER

## 2023-03-02 RX ORDER — AZITHROMYCIN 250 MG/1
250 TABLET, FILM COATED ORAL DAILY
Qty: 5 TABLET | Refills: 0 | Status: SHIPPED | OUTPATIENT
Start: 2023-03-02 | End: 2023-03-07

## 2023-03-02 RX ORDER — TRAMADOL HYDROCHLORIDE 50 MG/1
TABLET ORAL
Qty: 10 TABLET | Refills: 1 | Status: SHIPPED | OUTPATIENT
Start: 2023-03-02 | End: 2023-05-09

## 2023-03-02 NOTE — LETTER
3/2/2023        RE: Grace Vega  [id number] [place] [address] GERIATRICS    Chief Complaint   Patient presents with     RECHECK     HPI:  Grace Vega is a 89 year old  (3/15/1929), who is being seen today for an episodic care visit at: THE [facility] ([facility]) [[id number]]. Today's concern is: ***    Allergies, and PMH/PSH reviewed in [facility] today.  REVIEW OF SYSTEMS:  {vaqyqg28:101834}    Objective:   /62   Pulse 85   Temp 97.5  F (36.4  C)   Resp 17   Wt 65.5 kg (144 lb 6.4 oz)   BMI 26.41 kg/m    {Nursing home physical exam :097542}    {fgslab:747088}    Assessment/Plan:  {FGS DX2:908555}    MED REC REQUIRED{TIP  Click the link below to document or use med rec list, use list to pull in response :553082}  Post Medication Reconciliation Status: {MED REC LIST:068203}      Orders:  {fgsorders:827601}  ***    Electronically signed by: Cici Osborne ***            Sincerely,        FER Greene CNP

## 2023-03-14 DIAGNOSIS — J47.9 BRONCHIECTASIS WITHOUT ACUTE EXACERBATION (H): ICD-10-CM

## 2023-03-14 RX ORDER — GUAIFENESIN 200 MG/10ML
SOLUTION ORAL
Qty: 236 ML | Refills: 3 | Status: SHIPPED | OUTPATIENT
Start: 2023-03-14 | End: 2023-06-22

## 2023-03-16 ENCOUNTER — ASSISTED LIVING VISIT (OUTPATIENT)
Dept: GERIATRICS | Facility: CLINIC | Age: 88
End: 2023-03-16
Payer: COMMERCIAL

## 2023-03-16 VITALS
SYSTOLIC BLOOD PRESSURE: 108 MMHG | WEIGHT: 143.2 LBS | BODY MASS INDEX: 26.19 KG/M2 | RESPIRATION RATE: 16 BRPM | HEART RATE: 84 BPM | DIASTOLIC BLOOD PRESSURE: 65 MMHG | TEMPERATURE: 98.2 F

## 2023-03-16 DIAGNOSIS — M15.9 OSTEOARTHRITIS OF MULTIPLE JOINTS, UNSPECIFIED OSTEOARTHRITIS TYPE: Primary | ICD-10-CM

## 2023-03-16 DIAGNOSIS — J47.9 BRONCHIECTASIS WITHOUT ACUTE EXACERBATION (H): ICD-10-CM

## 2023-03-16 DIAGNOSIS — M54.16 LUMBAR RADICULOPATHY: ICD-10-CM

## 2023-03-16 DIAGNOSIS — M51.369 DDD (DEGENERATIVE DISC DISEASE), LUMBAR: ICD-10-CM

## 2023-03-16 DIAGNOSIS — R53.81 PHYSICAL DECONDITIONING: ICD-10-CM

## 2023-03-16 DIAGNOSIS — N18.32 STAGE 3B CHRONIC KIDNEY DISEASE (H): ICD-10-CM

## 2023-03-16 DIAGNOSIS — R41.89 COGNITIVE IMPAIRMENT: ICD-10-CM

## 2023-03-16 PROCEDURE — 99349 HOME/RES VST EST MOD MDM 40: CPT | Performed by: NURSE PRACTITIONER

## 2023-03-16 RX ORDER — BENZONATATE 100 MG/1
CAPSULE ORAL
Qty: 56 CAPSULE | Refills: 97 | Status: SHIPPED | OUTPATIENT
Start: 2023-03-16 | End: 2023-11-07

## 2023-03-16 NOTE — LETTER
3/16/2023        RE: Grace Vega  [address] [address]        [facility] GERIATRICS    Chief Complaint   Patient presents with     RECHECK     HPI:  Grace Vega is a 89 year old  (3/15/1929), who is being seen today for an episodic care visit at: THE [facility] ([facility]) [[id number]].   She and her  moved to this facility 6/2022 to be closer to family and for a higher level of care. Previously lived in an apartment in [place] and received care through [facility].   Medical history significant for DDD lumbar with radiculopathy, gait instability, osteoarthritis, hypothyroidism, bronchiectasis, CKD stage 3, LE edema, macular degeneration, cognitive impairment, left peroneal vein DVT 3/2021.   She was seen in the ED 8/26/2022 for a mechanical fall with head strike. She slipped on the wet floor while doing laundry. CT head showed a right frontal scalp hematoma,mild to moderate diffuse cerebral atrophy and mild small vessel ischemic disease, no acute intracranial pathology. CT cervical spine showed multilevel degenerative changes and no acute fracture or subluxation. Creatinine was 1.64. Facial laceration was repaired with 3 sutures.     Today's concern is:      Osteoarthritis of multiple joints, unspecified osteoarthritis type  DDD (degenerative disc disease), lumbar  Lumbar radiculopathy  Bronchiectasis without acute exacerbation (H)  Stage 3b chronic kidney disease (H)  Cognitive impairment  Physical deconditioning  She's feeling well, but reports her strength has declined. She feels more unsteady when using her walker within the apartment. No falls.  Reports knee pain is worse and she would like to have steroid injections repeated. Both knees were injected 12/26/2022 by Huber TAVERAS with good results. Reports no change in her chronic cough after azithromycin. Using tessalon and Robitussin with some relief. Denies  shortness of breath or chest pain. Independent with toileting and dressing. She's been declining staff assistance with bathing, but has agreed to try again. Staff takes her to the dining room in a wheelchair.     Allergies, and PMH/PSH reviewed in EPIC today    REVIEW OF SYSTEMS:  4 point ROS including Respiratory, CV, GI and , other than that noted in the HPI,  is negative    Objective:   /65   Pulse 84   Temp 98.2  F (36.8  C)   Resp 16   Wt 65 kg (143 lb 3.2 oz)   BMI 26.19 kg/m    GENERAL APPEARANCE:  Alert, in no distress   ENT:  [place], oropharynx clear  EYES:  conjunctiva and lids normal  NECK:  no adenopathy or thyromegaly   RESP:  lungs clear to auscultation, occasional dry cough, no wheezes or crackles   CV:  regular rate and rhythm, no murmur, no edema   ABDOMEN:  soft, non-tender, no distension, no masses  M/S:   gait slow with walker. LOPEZ.  Severe degenerative changes of knees, no acute inflammation   SKIN:  no rashes or open areas   PSYCH:  oriented X 2-3, insight and memory impaired , affect and mood normal    Recent labs in [facility] reviewed by me today.       ASSESSMENT / PLAN:  (M15.9) Osteoarthritis of multiple joints, unspecified osteoarthritis type  (primary encounter diagnosis)  (M51.36) DDD (degenerative disc disease), lumbar  (M54.16) Lumbar radiculopathy  Comment: chronic pain affects her mobility and functional status. No acute injury  Plan: refer to Huber TAVERAS for steroid injections. Continue tylenol, diclofenac gel, tramadol prn (rarely used).   Discussed with macario Vega, who agrees with plan of care.     (J47.9) Bronchiectasis without acute exacerbation (H)  Comment: long standing with work up prior to moving to [place]. No improvement with trial of azithromycin. No s/s of acute illness   Plan: continue tessalon, Robitussin. Monitor symptoms. She's not interested in Pulmonary consult.     (N18.32) Stage 3b chronic kidney disease (H)  Comment: creatinine 1.38 on 11/3/2022,  baseline   Plan: encourage fluids. Avoid nephrotoxins. Renal dose meds. Follow BMP    (R41.89) Cognitive impairment  Comment: mild deficits with poor short term memory. Cognition appears at baseline   Plan: [place] staff assistance with cares, meals, activities, med admin     (R53.81) Physical deconditioning  Comment: gradual decline in mobility and strength  Plan: refer to [facility] for PT            Electronically signed by: FER Greene CNP             Sincerely,        FER Greene CNP

## 2023-03-16 NOTE — PROGRESS NOTES
[facility] GERIATRICS    Chief Complaint   Patient presents with     RECHECK     HPI:  Grace Vega is a 89 year old  (3/15/1929), who is being seen today for an episodic care visit at: THE [facility]) [[id number]].   She and her  moved to this facility 6/2022 to be closer to family and for a higher level of care. Previously lived in an apartment in [place] and received care through [facility].   Medical history significant for DDD lumbar with radiculopathy, gait instability, osteoarthritis, hypothyroidism, bronchiectasis, CKD stage 3, LE edema, macular degeneration, cognitive impairment, left peroneal vein DVT 3/2021.   She was seen in the ED 8/26/2022 for a mechanical fall with head strike. She slipped on the wet floor while doing laundry. CT head showed a right frontal scalp hematoma,mild to moderate diffuse cerebral atrophy and mild small vessel ischemic disease, no acute intracranial pathology. CT cervical spine showed multilevel degenerative changes and no acute fracture or subluxation. Creatinine was 1.64. Facial laceration was repaired with 3 sutures.     Today's concern is:      Osteoarthritis of multiple joints, unspecified osteoarthritis type  DDD (degenerative disc disease), lumbar  Lumbar radiculopathy  Bronchiectasis without acute exacerbation (H)  Stage 3b chronic kidney disease (H)  Cognitive impairment  Physical deconditioning  She's feeling well, but reports her strength has declined. She feels more unsteady when using her walker within the apartment. No falls.  Reports knee pain is worse and she would like to have steroid injections repeated. Both knees were injected 12/26/2022 by Huber TAVERAS with good results. Reports no change in her chronic cough after azithromycin. Using tessalon and Robitussin with some relief. Denies shortness of breath or chest pain. Independent with toileting and dressing. She's been declining staff assistance with  bathing, but has agreed to try again. Staff takes her to the dining room in a wheelchair.     Allergies, and PMH/PSH reviewed in EPIC today    REVIEW OF SYSTEMS:  4 point ROS including Respiratory, CV, GI and , other than that noted in the HPI,  is negative    Objective:   /65   Pulse 84   Temp 98.2  F (36.8  C)   Resp 16   Wt 65 kg (143 lb 3.2 oz)   BMI 26.19 kg/m    GENERAL APPEARANCE:  Alert, in no distress   ENT:  [place], oropharynx clear  EYES:  conjunctiva and lids normal  NECK:  no adenopathy or thyromegaly   RESP:  lungs clear to auscultation, occasional dry cough, no wheezes or crackles   CV:  regular rate and rhythm, no murmur, no edema   ABDOMEN:  soft, non-tender, no distension, no masses  M/S:   gait slow with walker. LOPEZ.  Severe degenerative changes of knees, no acute inflammation   SKIN:  no rashes or open areas   PSYCH:  oriented X 2-3, insight and memory impaired , affect and mood normal    Recent labs in [facility] reviewed by me today.       ASSESSMENT / PLAN:  (M15.9) Osteoarthritis of multiple joints, unspecified osteoarthritis type  (primary encounter diagnosis)  (M51.36) DDD (degenerative disc disease), lumbar  (M54.16) Lumbar radiculopathy  Comment: chronic pain affects her mobility and functional status. No acute injury  Plan: refer to Huber TAVERAS for steroid injections. Continue tylenol, diclofenac gel, tramadol prn (rarely used).   Discussed with macario Vega, who agrees with plan of care.     (J47.9) Bronchiectasis without acute exacerbation (H)  Comment: long standing with work up prior to moving to [place]. No improvement with trial of azithromycin. No s/s of acute illness   Plan: continue tessalon, Robitussin. Monitor symptoms. She's not interested in Pulmonary consult.     (N18.32) Stage 3b chronic kidney disease (H)  Comment: creatinine 1.38 on 11/3/2022, baseline   Plan: encourage fluids. Avoid nephrotoxins. Renal dose meds. Follow BMP    (R41.89) Cognitive  impairment  Comment: mild deficits with poor short term memory. Cognition appears at baseline   Plan: [place] staff assistance with cares, meals, activities, med admin     (R53.81) Physical deconditioning  Comment: gradual decline in mobility and strength  Plan: refer to [facility] for PT            Electronically signed by: FER Greene CNP

## 2023-03-21 ENCOUNTER — ASSISTED LIVING VISIT (OUTPATIENT)
Dept: GERIATRICS | Facility: CLINIC | Age: 88
End: 2023-03-21
Payer: COMMERCIAL

## 2023-03-21 ENCOUNTER — ASSISTED LIVING VISIT (OUTPATIENT)
Dept: GERIATRICS | Facility: CLINIC | Age: 88
End: 2023-03-21

## 2023-03-21 VITALS
TEMPERATURE: 98.2 F | WEIGHT: 143.2 LBS | HEART RATE: 84 BPM | RESPIRATION RATE: 16 BRPM | DIASTOLIC BLOOD PRESSURE: 65 MMHG | SYSTOLIC BLOOD PRESSURE: 108 MMHG | BODY MASS INDEX: 26.19 KG/M2

## 2023-03-21 DIAGNOSIS — M15.9 OSTEOARTHRITIS OF MULTIPLE JOINTS, UNSPECIFIED OSTEOARTHRITIS TYPE: Primary | ICD-10-CM

## 2023-03-21 DIAGNOSIS — M54.16 LUMBAR RADICULOPATHY: ICD-10-CM

## 2023-03-21 DIAGNOSIS — M51.369 DDD (DEGENERATIVE DISC DISEASE), LUMBAR: ICD-10-CM

## 2023-03-21 PROCEDURE — 99348 HOME/RES VST EST LOW MDM 30: CPT | Performed by: NURSE PRACTITIONER

## 2023-03-21 PROCEDURE — 99207 PR NO CHARGE LOS: CPT | Performed by: PHYSICIAN ASSISTANT

## 2023-03-21 NOTE — PROGRESS NOTES
Ortho Nursing home visit    Grace Vega is a 89 year old female who resides at [place], [place]    Patient is seen today for chronic knee pain, hs of OA< and has had injections in the past with some pain relief;; It has been 3 months since past injections;      Past Medical History:   Diagnosis Date     Acquired hypothyroidism      Bilateral leg edema      Bronchiectasis (H)      DDD (degenerative disc disease), lumbar      Deep venous thrombosis (DVT) of left peroneal vein (H) 09/2021     History of basal cell carcinoma      Lumbar radiculopathy      Macular degeneration (senile) of retina      Osteopenia      Pseudophakia     right eye     Sensorineural hearing loss, bilateral      Tinnitus, bilateral      Vertigo       Past Surgical History:   Procedure Laterality Date     FOOT SURGERY       TONSILLECTOMY       WRIST GANGLION EXCISION Left 1949        Allergies   Allergen Reactions     Sulfa Drugs       There were no vitals taken for this visit.     Exam: Seated; allows PROM, to both knees, WNL, no noted locking or crepitus, no noted effusion. Pain with W/B. Using walker, short distance only      X-rays show : advanced OA both knees,    ASSESSMENT / PLAN: After consent; both knees prepped, injected with 1 ml depo medrol, 4 ml 1% lidocaine, into medial joints, . Tolerated well, no complaints,     F/U PRN    20610x2          JKillian OPA-C  [phone] Cell

## 2023-03-21 NOTE — PROGRESS NOTES
[facility] GERIATRICS    Chief Complaint   Patient presents with     RECHECK     HPI:  Grace Vega is a 89 year old  (3/15/1929), who is being seen today for an episodic care visit at: THE [facility]) [[id number]].   She and her  moved to this facility 6/2022 to be closer to family and for a higher level of care. Previously lived in an apartment in [place] and received care through [facility].   Medical history significant for DDD lumbar with radiculopathy, gait instability, osteoarthritis, hypothyroidism, bronchiectasis, CKD stage 3, LE edema, macular degeneration, cognitive impairment, left peroneal vein DVT 3/2021.   She was seen in the ED 8/26/2022 for a mechanical fall with head strike. She slipped on the wet floor while doing laundry. CT head showed a right frontal scalp hematoma,mild to moderate diffuse cerebral atrophy and mild small vessel ischemic disease, no acute intracranial pathology. CT cervical spine showed multilevel degenerative changes and no acute fracture or subluxation. Creatinine was 1.64. Facial laceration was repaired with 3 sutures.     Today's concern is:      Osteoarthritis of multiple joints, unspecified osteoarthritis type  DDD (degenerative disc disease), lumbar  Lumbar radiculopathy  She is seen today with Huber TAVERAS for bilateral knee steroid injections. Reports feeling well, except for knee pain.  Please refer to Huber's note.     Allergies, and PMH/PSH reviewed in EPIC today    REVIEW OF SYSTEMS:  4 point ROS including Respiratory, CV, GI and , other than that noted in the HPI,  is negative    Objective:   /65   Pulse 84   Temp 98.2  F (36.8  C)   Resp 16   Wt 65 kg (143 lb 3.2 oz)   BMI 26.19 kg/m    GENERAL APPEARANCE:  Alert, in no distress  ENT:  [place]  EYES:  conjunctiva and lids normal  RESP:  no respiratory distress  CV:  no edema  ABDOMEN:  no distension  M/S:   up in chair. Severe degenerative changes  both knees, no acute inflammation  SKIN:  no rashes or open areas  PSYCH:  oriented to 2-3, insight and judgement impaired, memory impaired , affect and mood normal    Recent labs in EPIC reviewed by me today.     ASSESSMENT / PLAN:  (M15.9) Osteoarthritis of multiple joints, unspecified osteoarthritis type  (primary encounter diagnosis)  Comment: chronic and affects her mobility. Tolerated steroid injections without difficulty  Plan: continue tylenol, diclofenac gel, tramadol prn. Repeat steroid injections prn. Walker within the apartment, wheelchair for longer distances.     (M51.36) DDD (degenerative disc disease), lumbar  (M54.16) Lumbar radiculopathy  Comment: back pain well controlled   Plan: pain meds as above.         Electronically signed by: FER Greene CNP

## 2023-03-21 NOTE — LETTER
3/21/2023        RE: Grace Vega  [address] [address]        [facility] GERIATRICS    Chief Complaint   Patient presents with     RECHECK     HPI:  Grace Vega is a 89 year old  (3/15/1929), who is being seen today for an episodic care visit at: THE [facility] ([facility]) [[id number]].   She and her  moved to this facility 6/2022 to be closer to family and for a higher level of care. Previously lived in an apartment in [place] and received care through [facility].   Medical history significant for DDD lumbar with radiculopathy, gait instability, osteoarthritis, hypothyroidism, bronchiectasis, CKD stage 3, LE edema, macular degeneration, cognitive impairment, left peroneal vein DVT 3/2021.   She was seen in the ED 8/26/2022 for a mechanical fall with head strike. She slipped on the wet floor while doing laundry. CT head showed a right frontal scalp hematoma,mild to moderate diffuse cerebral atrophy and mild small vessel ischemic disease, no acute intracranial pathology. CT cervical spine showed multilevel degenerative changes and no acute fracture or subluxation. Creatinine was 1.64. Facial laceration was repaired with 3 sutures.     Today's concern is:      Osteoarthritis of multiple joints, unspecified osteoarthritis type  DDD (degenerative disc disease), lumbar  Lumbar radiculopathy  She is seen today with Huber TAVERAS for bilateral knee steroid injections. Reports feeling well, except for knee pain.  Please refer to Huber's note.     Allergies, and PMH/PSH reviewed in EPIC today    REVIEW OF SYSTEMS:  4 point ROS including Respiratory, CV, GI and , other than that noted in the HPI,  is negative    Objective:   /65   Pulse 84   Temp 98.2  F (36.8  C)   Resp 16   Wt 65 kg (143 lb 3.2 oz)   BMI 26.19 kg/m    GENERAL APPEARANCE:  Alert, in no distress  ENT:  [place]  EYES:  conjunctiva and lids normal  RESP:   no respiratory distress  CV:  no edema  ABDOMEN:  no distension  M/S:   up in chair. Severe degenerative changes both knees, no acute inflammation  SKIN:  no rashes or open areas  PSYCH:  oriented to 2-3, insight and judgement impaired, memory impaired , affect and mood normal    [facility].     ASSESSMENT / PLAN:  (M15.9) Osteoarthritis of multiple joints, unspecified osteoarthritis type  (primary encounter diagnosis)  Comment: chronic and affects her mobility. Tolerated steroid injections without difficulty  Plan: continue tylenol, diclofenac gel, tramadol prn. Repeat steroid injections prn. Walker within the apartment, wheelchair for longer distances.     (M51.36) DDD (degenerative disc disease), lumbar  (M54.16) Lumbar radiculopathy  Comment: back pain well controlled   Plan: pain meds as above.         Electronically signed by: FER Greene CNP             Sincerely,        FER Greene CNP

## 2023-04-03 DIAGNOSIS — Z53.9 DIAGNOSIS NOT YET DEFINED: Primary | ICD-10-CM

## 2023-04-03 PROCEDURE — G0180 MD CERTIFICATION HHA PATIENT: HCPCS | Performed by: NURSE PRACTITIONER

## 2023-04-18 ENCOUNTER — ASSISTED LIVING VISIT (OUTPATIENT)
Dept: GERIATRICS | Facility: CLINIC | Age: 88
End: 2023-04-18
Payer: COMMERCIAL

## 2023-04-18 VITALS
WEIGHT: 146.8 LBS | HEART RATE: 86 BPM | BODY MASS INDEX: 26.85 KG/M2 | SYSTOLIC BLOOD PRESSURE: 141 MMHG | DIASTOLIC BLOOD PRESSURE: 77 MMHG | TEMPERATURE: 97.6 F | RESPIRATION RATE: 20 BRPM

## 2023-04-18 DIAGNOSIS — R41.89 COGNITIVE IMPAIRMENT: ICD-10-CM

## 2023-04-18 DIAGNOSIS — M54.16 LUMBAR RADICULOPATHY: ICD-10-CM

## 2023-04-18 DIAGNOSIS — J47.9 BRONCHIECTASIS WITHOUT ACUTE EXACERBATION (H): ICD-10-CM

## 2023-04-18 DIAGNOSIS — M15.9 OSTEOARTHRITIS OF MULTIPLE JOINTS, UNSPECIFIED OSTEOARTHRITIS TYPE: Primary | ICD-10-CM

## 2023-04-18 DIAGNOSIS — R53.81 PHYSICAL DECONDITIONING: ICD-10-CM

## 2023-04-18 DIAGNOSIS — M51.369 DDD (DEGENERATIVE DISC DISEASE), LUMBAR: ICD-10-CM

## 2023-04-18 PROCEDURE — 99349 HOME/RES VST EST MOD MDM 40: CPT | Performed by: NURSE PRACTITIONER

## 2023-04-18 NOTE — PROGRESS NOTES
[facility] GERIATRICS    Chief Complaint   Patient presents with     RECHECK     HPI:  Grace Vega is a 89 year old  (3/15/1929), who is being seen today for an episodic care visit at: THE [facility]) [[id number]].   She and her  moved to this facility 6/2022 to be closer to family and for a higher level of care. Previously lived in an apartment in [place] and received care through [facility].   Medical history significant for DDD lumbar with radiculopathy, gait instability, osteoarthritis, hypothyroidism, bronchiectasis, CKD stage 3, LE edema, macular degeneration, cognitive impairment, left peroneal vein DVT 3/2021.   She was seen in the ED 8/26/2022 for a mechanical fall with head strike. She slipped on the wet floor while doing laundry. CT head showed a right frontal scalp hematoma,mild to moderate diffuse cerebral atrophy and mild small vessel ischemic disease, no acute intracranial pathology. CT cervical spine showed multilevel degenerative changes and no acute fracture or subluxation. Creatinine was 1.64. Facial laceration was repaired with 3 sutures.     Today's concern is:      Osteoarthritis of multiple joints, unspecified osteoarthritis type  DDD (degenerative disc disease), lumbar  Lumbar radiculopathy  Bronchiectasis without acute exacerbation (H)  Cognitive impairment  Physical deconditioning  She is seen today at her request to discuss pain and mobility concerns. She reports pain of her knees remains severe, though she had improvement after steroid injection 3/21/2023. She's working with home therapies and wants to start walking to the dining room, which is a floor down and on the opposite side of the building. Reports she's having more trouble getting in and out of bed and therapy recommended a halo attachment. She stopped daily tramadol as it caused nausea. Reports her chronic cough has been less. No shortness of breath. Good appetite. Uses a  walker within the apartment and ambulates once a day in the woods. Wheelchair for longer distances. Independent with toileting and dressing. Requires assistance with bathing.     Allergies, and PMH/PSH reviewed in EPIC today    REVIEW OF SYSTEMS:  4 point ROS including Respiratory, CV, GI and , other than that noted in the HPI,  is negative    Objective:   BP (!) 141/77   Pulse 86   Temp 97.6  F (36.4  C)   Resp 20   Wt 66.6 kg (146 lb 12.8 oz)   BMI 26.85 kg/m    GENERAL APPEARANCE:  Alert, in no distress   ENT:  [place], oropharynx clear  EYES:  conjunctiva and lids normal  NECK:  no adenopathy or thyromegaly   RESP:  lungs clear to auscultation, no wheezes or crackles   CV:  regular rate and rhythm, no murmur, no edema   ABDOMEN:  soft, non-tender, no distension, no masses  M/S:   gait slow with walker. LOPEZ.  Degenerative changes and valgus deformity of both knees, no acute inflammation   SKIN:  no rashes or open areas   PSYCH:  oriented X 2-3, insight and memory impaired , affect and mood normal    Recent labs in [facility] reviewed by me today.     ASSESSMENT / PLAN:  (M15.9) Osteoarthritis of multiple joints, unspecified osteoarthritis type  (primary encounter diagnosis)  (M51.36) DDD (degenerative disc disease), lumbar  (M54.16) Lumbar radiculopathy  Comment: some improvement in knee pain after steroid injection. Gait is unsteady, high fall risk. Recommendation is to use a wheelchair to the dining room due to the distance.   We discussed trying low dose oxycodone, which she declines.   Plan: continue tylenol, diclofenac gel. Home therapies.   Discussed with her niurkaon Olayinka Vega, who will follow up re: equipment recommendations from therapy.   Discussed with staff.     (J47.9) Bronchiectasis without acute exacerbation (H)  Comment: chronic. No improvement in cough with a course of azithromycin 3/2022  Plan: continue tessalon, robitussin     (R41.89) Cognitive impairment  Comment: mild deficits. Conversation is  appropriate but repetitive   Plan: AL staff assistance with cares, meals, activities, med admin     (R53.81) Physical deconditioning  Comment: progressing in therapies   Plan: continue PHYSICAL THERAPY/OT with [facility]        Electronically signed by: FER Greene CNP

## 2023-04-18 NOTE — LETTER
4/18/2023        RE: Grace Vega  [address] [address]        [facility] GERIATRICS    Chief Complaint   Patient presents with     RECHECK     HPI:  Grace Vega is a 89 year old  (3/15/1929), who is being seen today for an episodic care visit at: THE [facility]) [[id number]].   She and her  moved to this facility 6/2022 to be closer to family and for a higher level of care. Previously lived in an apartment in [place] and received care through [facility].   Medical history significant for DDD lumbar with radiculopathy, gait instability, osteoarthritis, hypothyroidism, bronchiectasis, CKD stage 3, LE edema, macular degeneration, cognitive impairment, left peroneal vein DVT 3/2021.   She was seen in the ED 8/26/2022 for a mechanical fall with head strike. She slipped on the wet floor while doing laundry. CT head showed a right frontal scalp hematoma,mild to moderate diffuse cerebral atrophy and mild small vessel ischemic disease, no acute intracranial pathology. CT cervical spine showed multilevel degenerative changes and no acute fracture or subluxation. Creatinine was 1.64. Facial laceration was repaired with 3 sutures.     Today's concern is:      Osteoarthritis of multiple joints, unspecified osteoarthritis type  DDD (degenerative disc disease), lumbar  Lumbar radiculopathy  Bronchiectasis without acute exacerbation (H)  Cognitive impairment  Physical deconditioning  She is seen today at her request to discuss pain and mobility concerns. She reports pain of her knees remains severe, though she had improvement after steroid injection 3/21/2023. She's working with home therapies and wants to start walking to the dining room, which is a floor down and on the opposite side of the building. Reports she's having more trouble getting in and out of bed and therapy recommended a halo attachment. She stopped daily  tramadol as it caused nausea. Reports her chronic cough has been less. No shortness of breath. Good appetite. Uses a walker within the apartment and ambulates once a day in the woods. Wheelchair for longer distances. Independent with toileting and dressing. Requires assistance with bathing.     Allergies, and PMH/PSH reviewed in EPIC today    REVIEW OF SYSTEMS:  4 point ROS including Respiratory, CV, GI and , other than that noted in the HPI,  is negative    Objective:   BP (!) 141/77   Pulse 86   Temp 97.6  F (36.4  C)   Resp 20   Wt 66.6 kg (146 lb 12.8 oz)   BMI 26.85 kg/m    GENERAL APPEARANCE:  Alert, in no distress   ENT:  [place], oropharynx clear  EYES:  conjunctiva and lids normal  NECK:  no adenopathy or thyromegaly   RESP:  lungs clear to auscultation, no wheezes or crackles   CV:  regular rate and rhythm, no murmur, no edema   ABDOMEN:  soft, non-tender, no distension, no masses  M/S:   gait slow with walker. LOPEZ.  Degenerative changes and valgus deformity of both knees, no acute inflammation   SKIN:  no rashes or open areas   PSYCH:  oriented X 2-3, insight and memory impaired , affect and mood normal    Recent labs in [facility] reviewed by me today.     ASSESSMENT / PLAN:  (M15.9) Osteoarthritis of multiple joints, unspecified osteoarthritis type  (primary encounter diagnosis)  (M51.36) DDD (degenerative disc disease), lumbar  (M54.16) Lumbar radiculopathy  Comment: some improvement in knee pain after steroid injection. Gait is unsteady, high fall risk. Recommendation is to use a wheelchair to the dining room due to the distance.   We discussed trying low dose oxycodone, which she declines.   Plan: continue tylenol, diclofenac gel. Home therapies.   Discussed with her niurkaon Olayinka Vega, who will follow up re: equipment recommendations from therapy.   Discussed with staff.     (J47.9) Bronchiectasis without acute exacerbation (H)  Comment: chronic. No improvement in cough with a course of azithromycin  3/2022  Plan: continue tessalon, robitussin     (R41.89) Cognitive impairment  Comment: mild deficits. Conversation is appropriate but repetitive   Plan: AL staff assistance with cares, meals, activities, med admin     (R53.81) Physical deconditioning  Comment: progressing in therapies   Plan: continue PHYSICAL THERAPY/OT with [facility]        Electronically signed by: FER Greene CNP             Sincerely,        FER Greene CNP

## 2023-05-09 ENCOUNTER — TELEPHONE (OUTPATIENT)
Dept: GERIATRICS | Facility: CLINIC | Age: 88
End: 2023-05-09
Payer: COMMERCIAL

## 2023-05-09 DIAGNOSIS — M15.9 OSTEOARTHRITIS OF MULTIPLE JOINTS, UNSPECIFIED OSTEOARTHRITIS TYPE: ICD-10-CM

## 2023-05-09 DIAGNOSIS — M51.369 DDD (DEGENERATIVE DISC DISEASE), LUMBAR: ICD-10-CM

## 2023-05-09 RX ORDER — TRAMADOL HYDROCHLORIDE 50 MG/1
TABLET ORAL
Qty: 10 TABLET | Refills: 1 | Status: SHIPPED | OUTPATIENT
Start: 2023-05-09 | End: 2023-06-23

## 2023-05-09 NOTE — TELEPHONE ENCOUNTER
Patient requests resuming daily scheduled dose of tramadol 25 mg for back and knee pain. Continue 25 mg every 6 hrs prn.     Script sent to pharmacy, order written at the facility and discussed with staff.     FER Greene CNP on 5/9/2023 at 12:29 PM

## 2023-05-21 ENCOUNTER — HEALTH MAINTENANCE LETTER (OUTPATIENT)
Age: 88
End: 2023-05-21

## 2023-05-28 DIAGNOSIS — Z53.9 DIAGNOSIS NOT YET DEFINED: Primary | ICD-10-CM

## 2023-05-28 PROCEDURE — G0179 MD RECERTIFICATION HHA PT: HCPCS | Performed by: NURSE PRACTITIONER

## 2023-06-12 DIAGNOSIS — E56.9 VITAMIN DEFICIENCY: ICD-10-CM

## 2023-06-14 RX ORDER — MV-MIN/FA/VIT K/LUTEIN/ZEAXANT 200MCG-5MG
CAPSULE ORAL
Qty: 28 CAPSULE | Refills: 97 | Status: SHIPPED | OUTPATIENT
Start: 2023-06-14

## 2023-06-20 NOTE — PROGRESS NOTES
[facility] GERIATRICS  [place] Medical Record Number: [id number]  Chief Complaint   Patient presents with     RECHECK     HPI: Grace Vega is a 89 year old (3/15/1929), who is being seen today for an episodic care visit at: THE [facility]) [[id number]]. She and her  moved to this facility 6/2022 to be closer to family and for a higher level of care. Previously lived in an apartment in [place] and received care through [facility].   Medical history significant for DDD lumbar with radiculopathy, gait instability, osteoarthritis, hypothyroidism, bronchiectasis, CKD stage 3, LE edema, macular degeneration, cognitive impairment, left peroneal vein DVT 3/2021.   She was seen in the ED 8/26/2022 for a mechanical fall with head strike. She slipped on the wet floor while doing laundry. CT head showed a right frontal scalp hematoma,mild to moderate diffuse cerebral atrophy and mild small vessel ischemic disease, no acute intracranial pathology. CT cervical spine showed multilevel degenerative changes and no acute fracture or subluxation. Creatinine was 1.64. Facial laceration was repaired with 3 sutures.       Today's concern is:      Osteoarthritis of multiple joints, unspecified osteoarthritis type  DDD (degenerative disc disease), lumbar  Lumbar radiculopathy  Stage 3b chronic kidney disease (H)  Bronchiectasis without acute exacerbation (H)  Acquired hypothyroidism  Cognitive impairment  Physical deconditioning  She is seen today for a steroid injection with Huber TAVERAS and other chronic medical issues. Reports bilateral knee pain is severe at times and limits her ambulation. Working with therapies. Uses a walker within the apartment, wheelchair for longer distances. No falls. Reports chronic cough has been more bothersome recently. No shortness of breath or chest pain. Afebrile and denies feeling ill. She feels her vision is worse and wants an eye exam.  Independent with toileting and dressing, receives assistance with bathing. Staff reports no new issues.     Allergies and PMH/PSH reviewed in [facility] today.    REVIEW OF SYSTEMS:  4 point ROS including Respiratory, CV, GI and , other than that noted in the HPI,  is negative    Objective:   BP (!) 141/77   Pulse 86   Temp 97.6  F (36.4  C)   Resp 20   Wt 66.6 kg (146 lb 12.8 oz)   BMI 26.85 kg/m    Exam:  Exam unchanged   GENERAL APPEARANCE:  Alert, in no distress   ENT:  [place], oropharynx clear  EYES:  conjunctiva and lids normal  NECK:  no adenopathy or thyromegaly   RESP:  lungs clear to auscultation, no wheezes or crackles   CV:  regular rate and rhythm, no murmur, no edema   ABDOMEN:  soft, non-tender, no distension, no masses  M/S:   gait slow with walker. LOPEZ.  Degenerative changes and valgus deformity of both knees, no acute inflammation   SKIN:  no rashes or open areas   PSYCH:  oriented X 2-3, insight and memory impaired , affect and mood normal    Labs:   Recent labs in [facility] reviewed by me today.     ASSESSMENT / PLAN:  (M15.9) Osteoarthritis of multiple joints, unspecified osteoarthritis type  (primary encounter diagnosis)  (M51.36) DDD (degenerative disc disease), lumbar  (M54.16) Lumbar radiculopathy  Comment: steroid injection to both knees given by Huber TAVERAS, please refer to his note. She declines increasing tramadol or tylenol  Plan: continue tramadol 25 mg daily, tylenol 1000 mg daily and prn, diclofenac gel.   Discussed with her stepson Olayinka Vega who agrees with plan of care. He will follow up with patient's daughter re: eye exam.   Discussed with staff.     (N18.32) Stage 3b chronic kidney disease (H)  Comment: creatinine 1.38 on 11/3/2022, baseline   Plan: CBC, BMP. Avoid nephrotoxins. Renal dose meds     (J47.9) Bronchiectasis without acute exacerbation (H)  Comment: chronic. No hypoxia, no s/s of acute illness  No improvement after a course of azithromycin 3/2023   Plan: increase  robitussin to bid and prn, continue tessalon perles. Monitor symptoms     (E03.9) Acquired hypothyroidism  Comment: TSH 0.80 in 4/2022  Plan: continue levothyroxine 88 mcg daily. TSH    (R41.89) Cognitive impairment  Comment: mild deficits, poor short term memory   Plan: AL staff assistance with cares, meals, activities, med admin     (R53.81) Physical deconditioning  Comment: slow progress in therapies   Plan: continue PHYSICAL THERAPY/OT with [facility]        Electronically signed by: FER Greene CNP

## 2023-06-21 VITALS
DIASTOLIC BLOOD PRESSURE: 77 MMHG | WEIGHT: 146.8 LBS | SYSTOLIC BLOOD PRESSURE: 141 MMHG | RESPIRATION RATE: 20 BRPM | TEMPERATURE: 97.6 F | BODY MASS INDEX: 26.85 KG/M2 | HEART RATE: 86 BPM

## 2023-06-22 ENCOUNTER — ASSISTED LIVING VISIT (OUTPATIENT)
Dept: GERIATRICS | Facility: CLINIC | Age: 88
End: 2023-06-22

## 2023-06-22 ENCOUNTER — ASSISTED LIVING VISIT (OUTPATIENT)
Dept: GERIATRICS | Facility: CLINIC | Age: 88
End: 2023-06-22
Payer: COMMERCIAL

## 2023-06-22 DIAGNOSIS — M15.9 OSTEOARTHRITIS OF MULTIPLE JOINTS, UNSPECIFIED OSTEOARTHRITIS TYPE: ICD-10-CM

## 2023-06-22 DIAGNOSIS — R41.89 COGNITIVE IMPAIRMENT: ICD-10-CM

## 2023-06-22 DIAGNOSIS — R53.81 PHYSICAL DECONDITIONING: ICD-10-CM

## 2023-06-22 DIAGNOSIS — M54.16 LUMBAR RADICULOPATHY: ICD-10-CM

## 2023-06-22 DIAGNOSIS — J47.9 BRONCHIECTASIS WITHOUT ACUTE EXACERBATION (H): ICD-10-CM

## 2023-06-22 DIAGNOSIS — M51.369 DDD (DEGENERATIVE DISC DISEASE), LUMBAR: ICD-10-CM

## 2023-06-22 DIAGNOSIS — M15.9 OSTEOARTHRITIS OF MULTIPLE JOINTS, UNSPECIFIED OSTEOARTHRITIS TYPE: Primary | ICD-10-CM

## 2023-06-22 DIAGNOSIS — M51.369 DDD (DEGENERATIVE DISC DISEASE), LUMBAR: Primary | ICD-10-CM

## 2023-06-22 DIAGNOSIS — N18.32 STAGE 3B CHRONIC KIDNEY DISEASE (H): ICD-10-CM

## 2023-06-22 DIAGNOSIS — E03.9 ACQUIRED HYPOTHYROIDISM: ICD-10-CM

## 2023-06-22 PROCEDURE — 99207 PR NO CHARGE LOS: CPT | Performed by: PHYSICIAN ASSISTANT

## 2023-06-22 PROCEDURE — 99349 HOME/RES VST EST MOD MDM 40: CPT | Performed by: NURSE PRACTITIONER

## 2023-06-22 RX ORDER — GUAIFENESIN 200 MG/10ML
LIQUID ORAL
Qty: 236 ML | Refills: 3 | Status: SHIPPED | OUTPATIENT
Start: 2023-06-22 | End: 2023-07-24

## 2023-06-22 NOTE — PROGRESS NOTES
Ortho Nursing home visit    Grace Vega is a 89 year old female who resides at [facility], Al , has been seen in the past for joint injections both knees, at request from family and Primary team, patient is seen on-site today with NP.    Patient is seen today for Cheko knee injections with cortisone. Last injections 3 month ago.      Past Medical History:   Diagnosis Date     Acquired hypothyroidism      Bilateral leg edema      Bronchiectasis (H)      DDD (degenerative disc disease), lumbar      Deep venous thrombosis (DVT) of left peroneal vein (H) 09/2021     History of basal cell carcinoma      Lumbar radiculopathy      Macular degeneration (senile) of retina      Osteopenia      Pseudophakia     right eye     Sensorineural hearing loss, bilateral      Tinnitus, bilateral      Vertigo       Past Surgical History:   Procedure Laterality Date     FOOT SURGERY       TONSILLECTOMY       WRIST GANGLION EXCISION Left 1949        Allergies   Allergen Reactions     Sulfa Antibiotics Unknown      There were no vitals taken for this visit.     Exam: Ambulated with walker, from bathroom, seated allows ROM to both knees WNL, lig intact, no noted effusion  Pain with W/B as has been for some time.      X-rays show advanced DJD both knees    ASSESSMENT / PLAN:  After consent. Both knees prepped , injected with 1 ml depo, medrol 4 ml 1% lidocaine into medial joints both knees,   Tolerated well , no complaints, F/U PRN    [id number]          [place] OPA-C with Theodore Skinner NP;  [phone] Cell

## 2023-06-22 NOTE — LETTER
6/22/2023        RE: Grace Vega  [id number] [place] [address] GERIATRICS  [place] Medical Record Number: [id number]  Chief Complaint   Patient presents with    RECHECK     HPI: Grace Vega is a 89 year old (3/15/1929), who is being seen today for an episodic care visit at: THE [facility] ([facility]) [[id number]]. Today's concern is: ***    Allergies and PMH/PSH reviewed in [facility] today.    REVIEW OF SYSTEMS:  {fguipl52:099214}    Objective:   There were no vitals taken for this visit.  Exam:  {senior care physical exam :143516}    Labs:   {fgslab:816980}    Assessment/Plan:  {FGS DX:877789}    MED REC REQUIRED{TIP  Click the link below to document or use med rec list, use list to pull in response :282241}  Post Medication Reconciliation Status: {MED REC LIST:671003}    Orders:  {fgsorders:169616}    Electronically signed by: Yani Wylie***      Sincerely,        FER Greene CNP

## 2023-06-23 DIAGNOSIS — M51.369 DDD (DEGENERATIVE DISC DISEASE), LUMBAR: ICD-10-CM

## 2023-06-23 DIAGNOSIS — M15.9 OSTEOARTHRITIS OF MULTIPLE JOINTS, UNSPECIFIED OSTEOARTHRITIS TYPE: ICD-10-CM

## 2023-06-23 RX ORDER — TRAMADOL HYDROCHLORIDE 50 MG/1
TABLET ORAL
Qty: 10 TABLET | Refills: 4 | Status: SHIPPED | OUTPATIENT
Start: 2023-06-23 | End: 2023-11-06

## 2023-06-27 ENCOUNTER — LAB REQUISITION (OUTPATIENT)
Dept: LAB | Facility: CLINIC | Age: 88
End: 2023-06-27
Payer: COMMERCIAL

## 2023-06-27 DIAGNOSIS — E03.8 OTHER SPECIFIED HYPOTHYROIDISM: ICD-10-CM

## 2023-06-27 DIAGNOSIS — N17.0 ACUTE KIDNEY FAILURE WITH TUBULAR NECROSIS (H): ICD-10-CM

## 2023-06-29 LAB
ANION GAP SERPL CALCULATED.3IONS-SCNC: 15 MMOL/L (ref 7–15)
BUN SERPL-MCNC: 31.2 MG/DL (ref 8–23)
CALCIUM SERPL-MCNC: 9.6 MG/DL (ref 8.2–9.6)
CHLORIDE SERPL-SCNC: 109 MMOL/L (ref 98–107)
CREAT SERPL-MCNC: 1.46 MG/DL (ref 0.51–0.95)
DEPRECATED HCO3 PLAS-SCNC: 21 MMOL/L (ref 22–29)
ERYTHROCYTE [DISTWIDTH] IN BLOOD BY AUTOMATED COUNT: 13.7 % (ref 10–15)
GFR SERPL CREATININE-BSD FRML MDRD: 33 ML/MIN/1.73M2
GLUCOSE SERPL-MCNC: 80 MG/DL (ref 70–99)
HCT VFR BLD AUTO: 41.2 % (ref 35–47)
HGB BLD-MCNC: 13.7 G/DL (ref 11.7–15.7)
MCH RBC QN AUTO: 33.7 PG (ref 26.5–33)
MCHC RBC AUTO-ENTMCNC: 33.3 G/DL (ref 31.5–36.5)
MCV RBC AUTO: 102 FL (ref 78–100)
PLATELET # BLD AUTO: 310 10E3/UL (ref 150–450)
POTASSIUM SERPL-SCNC: 4.6 MMOL/L (ref 3.4–5.3)
RBC # BLD AUTO: 4.06 10E6/UL (ref 3.8–5.2)
SODIUM SERPL-SCNC: 145 MMOL/L (ref 136–145)
TSH SERPL DL<=0.005 MIU/L-ACNC: 2.17 UIU/ML (ref 0.3–4.2)
WBC # BLD AUTO: 11 10E3/UL (ref 4–11)

## 2023-06-29 PROCEDURE — 84443 ASSAY THYROID STIM HORMONE: CPT | Mod: ORL | Performed by: NURSE PRACTITIONER

## 2023-06-29 PROCEDURE — 85027 COMPLETE CBC AUTOMATED: CPT | Mod: ORL | Performed by: NURSE PRACTITIONER

## 2023-06-29 PROCEDURE — P9603 ONE-WAY ALLOW PRORATED MILES: HCPCS | Mod: ORL | Performed by: NURSE PRACTITIONER

## 2023-06-29 PROCEDURE — 36415 COLL VENOUS BLD VENIPUNCTURE: CPT | Mod: ORL | Performed by: NURSE PRACTITIONER

## 2023-06-29 PROCEDURE — 80048 BASIC METABOLIC PNL TOTAL CA: CPT | Mod: ORL | Performed by: NURSE PRACTITIONER

## 2023-06-30 ENCOUNTER — TELEPHONE (OUTPATIENT)
Dept: GERIATRICS | Facility: CLINIC | Age: 88
End: 2023-06-30
Payer: COMMERCIAL

## 2023-06-30 DIAGNOSIS — U07.1 INFECTION DUE TO 2019 NOVEL CORONAVIRUS: Primary | ICD-10-CM

## 2023-06-30 NOTE — TELEPHONE ENCOUNTER
Grace Vega   3/15/1929    Orders 2023:  1. Paxlovid 2 tablets po bid X 5 days for COVID-19 infection  Sent to pharmacy  Discussed with FER Russell CNP on 2023 at 2:38 PM

## 2023-06-30 NOTE — TELEPHONE ENCOUNTER
Patient's stepson called reporting that both patient and his father have not felt well for the past 1-2 days with cough and fatigue.   Called AL nurse to request an assessment and she called back reporting both tested positive for COVID-19. Afebrile, hypoxia.     Order sent to AL for Paxlovid, renal dose.     FER Greene CNP on 6/30/2023 at 2:35 PM

## 2023-07-05 ENCOUNTER — ASSISTED LIVING VISIT (OUTPATIENT)
Dept: GERIATRICS | Facility: CLINIC | Age: 88
End: 2023-07-05
Payer: COMMERCIAL

## 2023-07-05 VITALS
RESPIRATION RATE: 14 BRPM | DIASTOLIC BLOOD PRESSURE: 55 MMHG | WEIGHT: 131.8 LBS | HEART RATE: 89 BPM | SYSTOLIC BLOOD PRESSURE: 145 MMHG | BODY MASS INDEX: 24.11 KG/M2 | TEMPERATURE: 99.8 F

## 2023-07-05 DIAGNOSIS — R11.2 NAUSEA AND VOMITING, UNSPECIFIED VOMITING TYPE: Primary | ICD-10-CM

## 2023-07-05 DIAGNOSIS — U07.1 INFECTION DUE TO 2019 NOVEL CORONAVIRUS: ICD-10-CM

## 2023-07-05 DIAGNOSIS — M15.9 OSTEOARTHRITIS OF MULTIPLE JOINTS, UNSPECIFIED OSTEOARTHRITIS TYPE: ICD-10-CM

## 2023-07-05 DIAGNOSIS — R41.89 COGNITIVE IMPAIRMENT: ICD-10-CM

## 2023-07-05 DIAGNOSIS — M51.369 DDD (DEGENERATIVE DISC DISEASE), LUMBAR: ICD-10-CM

## 2023-07-05 DIAGNOSIS — J47.9 BRONCHIECTASIS WITHOUT ACUTE EXACERBATION (H): ICD-10-CM

## 2023-07-05 DIAGNOSIS — M54.16 LUMBAR RADICULOPATHY: ICD-10-CM

## 2023-07-05 PROCEDURE — 99349 HOME/RES VST EST MOD MDM 40: CPT | Performed by: NURSE PRACTITIONER

## 2023-07-05 RX ORDER — ONDANSETRON 4 MG/1
TABLET, ORALLY DISINTEGRATING ORAL
Qty: 20 TABLET | Refills: 1 | Status: SHIPPED | OUTPATIENT
Start: 2023-07-05 | End: 2024-04-23

## 2023-07-05 NOTE — LETTER
7/5/2023        RE: Grace Vega  [address] [address]        [facility] GERIATRICS    Chief Complaint   Patient presents with     RECHECK     HPI:  Grace Vega is a 89 year old  (3/15/1929), who is being seen today for an episodic care visit at: THE [facility] ([facility]) [[id number]].    She and her  moved to this facility 6/2022 to be closer to family and for a higher level of care. Previously lived in an apartment in [place] and received care through [facility].   Medical history significant for DDD lumbar with radiculopathy, gait instability, osteoarthritis, hypothyroidism, bronchiectasis, CKD stage 3, LE edema, macular degeneration, cognitive impairment, left peroneal vein DVT 3/2021.   She was seen in the ED 8/26/2022 for a mechanical fall with head strike. She slipped on the wet floor while doing laundry. CT head showed a right frontal scalp hematoma,mild to moderate diffuse cerebral atrophy and mild small vessel ischemic disease, no acute intracranial pathology. CT cervical spine showed multilevel degenerative changes and no acute fracture or subluxation.Facial laceration was repaired with 3 sutures.     Today's concern is:      Nausea and vomiting, unspecified vomiting type  Infection due to 2019 novel coronavirus  Bronchiectasis without acute exacerbation (H)  DDD (degenerative disc disease), lumbar  Osteoarthritis of multiple joints, unspecified osteoarthritis type  Lumbar radiculopathy  Cognitive impairment   She is seen today for follow up after she and her  both tested positive for COVID-19 on 6/30/2023. She was symptomatic with fatigue, poor intake and worsening of her chronic cough. Paxlovid was started.   She's not feeling well with nausea and vomiting during our visit.  Reports the nausea has been off and on for the past 2 days and vomiting just started now. Trying to drink fluids. Denies  "abdominal pain, constipation or diarrhea. Reports frequent dry cough, no shortness of breath or chest pain. Afebrile, no hypoxia. When asked if she wants to go to the hospital, she responds \"absolutely not.\" Reports some improvement in knee pain after steroid injection last month. Ambulates with walker. Independent with toileting and dressing. Receives assistance with bathing.       Allergies, and PMH/PSH reviewed in EPIC today    REVIEW OF SYSTEMS:  4 point ROS including Respiratory, CV, GI and , other than that noted in the HPI,  is negative    Objective:   BP (!) 145/55   Pulse 89   Temp 99.8  F (37.7  C)   Resp 14   Wt 59.8 kg (131 lb 12.8 oz)   BMI 24.11 kg/m    GENERAL APPEARANCE:  Alert, appears ill   ENT:  [place], oropharynx clear  EYES:  conjunctiva and lids normal  NECK:  no adenopathy or thyromegaly   RESP:  lungs clear to auscultation, no wheezes or crackles   CV:  regular rate and rhythm, no murmur, no edema   ABDOMEN:  soft, non-tender, no distension, no masses  M/S: up in chair. LOPEZ. No acute joint inflammation   SKIN:  no rashes or open areas   PSYCH:  oriented X 2-3, insight and memory impaired , affect and mood normal       Recent labs in [facility] reviewed by me today.     ASSESSMENT / PLAN:  (R11.2) Nausea and vomiting, unspecified vomiting type  (primary encounter diagnosis)  (U07.1) Infection due to 2019 novel coronavirus  Comment: primarily GI symptoms with nausea, vomiting, poor intake. Chronic cough worse than usual, no hypoxia.   Plan: complete course of Paxlovid. Zofran ODT 4 mg po 30 mins prior to meals for 3 days then every 4 hrs prn. Encourage fluids. Closely monitor VS, O2 sats, symptoms.   Discussed with macario Vega, who agrees with plan of care.   Discussed with staff-they are aware to call if symptoms worsen, unable to tolerate oral intake.     (J47.9) Bronchiectasis without acute exacerbation (H)  Comment: chronic   Plan: closely monitor respiratory status     (M51.36) DDD " (degenerative disc disease), lumbar  (M15.9) Osteoarthritis of multiple joints, unspecified osteoarthritis type  (M54.16) Lumbar radiculopathy  Comment: improved knee pain after steroid injection 6/22/2023.   Plan: continue tylenol, tramadol, diclofenac gel. Refer to Huber TAVERAS for steroid injection prn     (R41.89) Cognitive impairment  Comment: cognition is at baseline. Functional status down slightly due to acute illness   Plan: [facility] staff assistance with cares, meals, activities, med admin         Electronically signed by: FER Greene CNP             Sincerely,        FER Greene CNP

## 2023-07-05 NOTE — PROGRESS NOTES
[facility] GERIATRICS    Chief Complaint   Patient presents with     RECHECK     HPI:  Grace Vega is a 89 year old  (3/15/1929), who is being seen today for an episodic care visit at: [facility]) [[id number]].    She and her  moved to this facility 6/2022 to be closer to family and for a higher level of care. Previously lived in an apartment in [place] and received care through [facility].   Medical history significant for DDD lumbar with radiculopathy, gait instability, osteoarthritis, hypothyroidism, bronchiectasis, CKD stage 3, LE edema, macular degeneration, cognitive impairment, left peroneal vein DVT 3/2021.   She was seen in the ED 8/26/2022 for a mechanical fall with head strike. She slipped on the wet floor while doing laundry. CT head showed a right frontal scalp hematoma,mild to moderate diffuse cerebral atrophy and mild small vessel ischemic disease, no acute intracranial pathology. CT cervical spine showed multilevel degenerative changes and no acute fracture or subluxation.Facial laceration was repaired with 3 sutures.     Today's concern is:      Nausea and vomiting, unspecified vomiting type  Infection due to 2019 novel coronavirus  Bronchiectasis without acute exacerbation (H)  DDD (degenerative disc disease), lumbar  Osteoarthritis of multiple joints, unspecified osteoarthritis type  Lumbar radiculopathy  Cognitive impairment   She is seen today for follow up after she and her  both tested positive for COVID-19 on 6/30/2023. She was symptomatic with fatigue, poor intake and worsening of her chronic cough. Paxlovid was started.   She's not feeling well with nausea and vomiting during our visit.  Reports the nausea has been off and on for the past 2 days and vomiting just started now. Trying to drink fluids. Denies abdominal pain, constipation or diarrhea. Reports frequent dry cough, no shortness of breath or chest pain. Afebrile, no  "hypoxia. When asked if she wants to go to the hospital, she responds \"absolutely not.\" Reports some improvement in knee pain after steroid injection last month. Ambulates with walker. Independent with toileting and dressing. Receives assistance with bathing.       Allergies, and PMH/PSH reviewed in EPIC today    REVIEW OF SYSTEMS:  4 point ROS including Respiratory, CV, GI and , other than that noted in the HPI,  is negative    Objective:   BP (!) 145/55   Pulse 89   Temp 99.8  F (37.7  C)   Resp 14   Wt 59.8 kg (131 lb 12.8 oz)   BMI 24.11 kg/m    GENERAL APPEARANCE:  Alert, appears ill   ENT:  [place], oropharynx clear  EYES:  conjunctiva and lids normal  NECK:  no adenopathy or thyromegaly   RESP:  lungs clear to auscultation, no wheezes or crackles   CV:  regular rate and rhythm, no murmur, no edema   ABDOMEN:  soft, non-tender, no distension, no masses  M/S: up in chair. LOPEZ. No acute joint inflammation   SKIN:  no rashes or open areas   PSYCH:  oriented X 2-3, insight and memory impaired , affect and mood normal       Recent labs in [facility] reviewed by me today.     ASSESSMENT / PLAN:  (R11.2) Nausea and vomiting, unspecified vomiting type  (primary encounter diagnosis)  (U07.1) Infection due to 2019 novel coronavirus  Comment: primarily GI symptoms with nausea, vomiting, poor intake. Chronic cough worse than usual, no hypoxia.   Plan: complete course of Paxlovid. Zofran ODT 4 mg po 30 mins prior to meals for 3 days then every 4 hrs prn. Encourage fluids. Closely monitor VS, O2 sats, symptoms.   Discussed with macario Vega, who agrees with plan of care.   Discussed with staff-they are aware to call if symptoms worsen, unable to tolerate oral intake.     (J47.9) Bronchiectasis without acute exacerbation (H)  Comment: chronic   Plan: closely monitor respiratory status     (M51.36) DDD (degenerative disc disease), lumbar  (M15.9) Osteoarthritis of multiple joints, unspecified osteoarthritis type  (M54.16) " Lumbar radiculopathy  Comment: improved knee pain after steroid injection 6/22/2023.   Plan: continue tylenol, tramadol, diclofenac gel. Refer to [facility] for steroid injection prn     (R41.89) Cognitive impairment  Comment: cognition is at baseline. Functional status down slightly due to acute illness   Plan: AL staff assistance with cares, meals, activities, med admin         Electronically signed by: FER Greene CNP

## 2023-07-13 ENCOUNTER — ASSISTED LIVING VISIT (OUTPATIENT)
Dept: GERIATRICS | Facility: CLINIC | Age: 88
End: 2023-07-13
Payer: COMMERCIAL

## 2023-07-13 VITALS
BODY MASS INDEX: 23.59 KG/M2 | RESPIRATION RATE: 22 BRPM | SYSTOLIC BLOOD PRESSURE: 108 MMHG | WEIGHT: 129 LBS | TEMPERATURE: 98.6 F | HEART RATE: 76 BPM | DIASTOLIC BLOOD PRESSURE: 75 MMHG

## 2023-07-13 DIAGNOSIS — U07.1 INFECTION DUE TO 2019 NOVEL CORONAVIRUS: Primary | ICD-10-CM

## 2023-07-13 DIAGNOSIS — M54.16 LUMBAR RADICULOPATHY: ICD-10-CM

## 2023-07-13 DIAGNOSIS — J47.9 BRONCHIECTASIS WITHOUT ACUTE EXACERBATION (H): ICD-10-CM

## 2023-07-13 DIAGNOSIS — R11.2 NAUSEA AND VOMITING, UNSPECIFIED VOMITING TYPE: ICD-10-CM

## 2023-07-13 DIAGNOSIS — M51.369 DDD (DEGENERATIVE DISC DISEASE), LUMBAR: ICD-10-CM

## 2023-07-13 DIAGNOSIS — R41.89 COGNITIVE IMPAIRMENT: ICD-10-CM

## 2023-07-13 DIAGNOSIS — M15.9 OSTEOARTHRITIS OF MULTIPLE JOINTS, UNSPECIFIED OSTEOARTHRITIS TYPE: ICD-10-CM

## 2023-07-13 PROCEDURE — 99349 HOME/RES VST EST MOD MDM 40: CPT | Performed by: NURSE PRACTITIONER

## 2023-07-13 NOTE — PROGRESS NOTES
"[facility] GERIATRICS    Chief Complaint   Patient presents with     RECHECK     HPI:  Grace Vega is a 89 year old  (3/15/1929), who is being seen today for an episodic care visit at: THE [facility]) [[id number]].   She and her  moved to this facility 6/2022 to be closer to family and for a higher level of care. Previously lived in an apartment in [place] and received care through [facility].   Medical history significant for DDD lumbar with radiculopathy, gait instability, osteoarthritis, hypothyroidism, bronchiectasis, CKD stage 3, LE edema, macular degeneration, cognitive impairment, left peroneal vein DVT 3/2021.   She was seen in the ED 8/26/2022 for a mechanical fall with head strike. She slipped on the wet floor while doing laundry. CT head showed a right frontal scalp hematoma,mild to moderate diffuse cerebral atrophy and mild small vessel ischemic disease, no acute intracranial pathology. CT cervical spine showed multilevel degenerative changes and no acute fracture or subluxation.Facial laceration was repaired with 3 sutures.     Today's concern is:      Infection due to 2019 novel coronavirus  Nausea and vomiting, unspecified vomiting type  Bronchiectasis without acute exacerbation (H)  Osteoarthritis of multiple joints, unspecified osteoarthritis type  DDD (degenerative disc disease), lumbar  Lumbar radiculopathy  Cognitive impairment  She is seen for follow up after she and her  both tested positive for COVID-19 on 6/30/2023. Paxlovid was started. Her main symptoms were nausea, vomiting, fatigue and worsening of her chronic cough. She reports \"slowly feeling better.\" States today is the first day that she's had an appetite and continues to have intermittent nausea. No vomiting or other GI symptoms. Reports her dry cough is \"almost\" back to baseline. No chest pain or shortness of breath. No hypoxia, afebrile. Ambulates with a walker. No " falls. Independent with toileting and dressing. Receives assistance with bathing.       Allergies, and PMH/PSH reviewed in EPIC today    REVIEW OF SYSTEMS:  4 point ROS including Respiratory, CV, GI and , other than that noted in the HPI,  is negative    Objective:   /75   Pulse 76   Temp 98.6  F (37  C)   Resp 22   Wt 58.5 kg (129 lb)   BMI 23.59 kg/m    GENERAL APPEARANCE:  Alert, in no distress   ENT:  [place], oropharynx clear  EYES:  conjunctiva and lids normal  NECK:  no adenopathy or thyromegaly   RESP:  lungs clear to auscultation   CV:  regular rate and rhythm, no murmur, no edema   ABDOMEN:  soft, non-tender, no distension, no masses  M/S: in recliner with legs elevated. LOPEZ. No acute joint inflammation   SKIN:  no rashes or open areas   PSYCH:  oriented X 2-3, insight and memory impaired , affect and mood normal    Recent labs in [facility] reviewed by me today.     ASSESSMENT / PLAN:  (U07.1) Infection due to 2019 novel coronavirus  (primary encounter diagnosis)  (R11.2) Nausea and vomiting, unspecified vomiting type  Comment: slowly improving. Continues with intermittent nausea, no vomiting. Completed Paxlovid 7/5/2023. Weight is down 10 lbs.   Plan: zofran daily X 5 days and every 4 hrs prn. Encourage intake. Follow weight. Monitor symptoms   Discussed with macario Vega     (J47.9) Bronchiectasis without acute exacerbation (H)  Comment: cough is close to baseline   Plan: continue tessalon, guaifenesin     (M15.9) Osteoarthritis of multiple joints, unspecified osteoarthritis type  (M51.36) DDD (degenerative disc disease), lumbar  (M54.16) Lumbar radiculopathy  Comment: knee pain improved after steroid injection 6/22/2023  Plan: continue tylenol, tramadol, diclofenac gel. Repeat steroid injections prn     (R41.89) Cognitive impairment  Comment: mild deficits, appears at baseline   Plan: AL staff assistance with cares, meals, activities, med admin         Electronically signed by: Jacinto Perez  FER Skinner CNP

## 2023-07-13 NOTE — LETTER
"    7/13/2023        RE: Grace Vega  [address] [address]        [facility] GERIATRICS    Chief Complaint   Patient presents with     RECHECK     HPI:  Grace Vega is a 89 year old  (3/15/1929), who is being seen today for an episodic care visit at: THE [facility] [[id number]].   She and her  moved to this facility 6/2022 to be closer to family and for a higher level of care. Previously lived in an apartment in [place] and received care through [facility].   Medical history significant for DDD lumbar with radiculopathy, gait instability, osteoarthritis, hypothyroidism, bronchiectasis, CKD stage 3, LE edema, macular degeneration, cognitive impairment, left peroneal vein DVT 3/2021.   She was seen in the ED 8/26/2022 for a mechanical fall with head strike. She slipped on the wet floor while doing laundry. CT head showed a right frontal scalp hematoma,mild to moderate diffuse cerebral atrophy and mild small vessel ischemic disease, no acute intracranial pathology. CT cervical spine showed multilevel degenerative changes and no acute fracture or subluxation.Facial laceration was repaired with 3 sutures.     Today's concern is:      Infection due to 2019 novel coronavirus  Nausea and vomiting, unspecified vomiting type  Bronchiectasis without acute exacerbation (H)  Osteoarthritis of multiple joints, unspecified osteoarthritis type  DDD (degenerative disc disease), lumbar  Lumbar radiculopathy  Cognitive impairment  She is seen for follow up after she and her  both tested positive for COVID-19 on 6/30/2023. Paxlovid was started. Her main symptoms were nausea, vomiting, fatigue and worsening of her chronic cough. She reports \"slowly feeling better.\" States today is the first day that she's had an appetite and continues to have intermittent nausea. No vomiting or other GI symptoms. Reports her dry cough is " "\"almost\" back to baseline. No chest pain or shortness of breath. No hypoxia, afebrile. Ambulates with a walker. No falls. Independent with toileting and dressing. Receives assistance with bathing.       Allergies, and PMH/PSH reviewed in EPIC today    REVIEW OF SYSTEMS:  4 point ROS including Respiratory, CV, GI and , other than that noted in the HPI,  is negative    Objective:   /75   Pulse 76   Temp 98.6  F (37  C)   Resp 22   Wt 58.5 kg (129 lb)   BMI 23.59 kg/m    GENERAL APPEARANCE:  Alert, in no distress   ENT:  [place], oropharynx clear  EYES:  conjunctiva and lids normal  NECK:  no adenopathy or thyromegaly   RESP:  lungs clear to auscultation   CV:  regular rate and rhythm, no murmur, no edema   ABDOMEN:  soft, non-tender, no distension, no masses  M/S: in recliner with legs elevated. LOPEZ. No acute joint inflammation   SKIN:  no rashes or open areas   PSYCH:  oriented X 2-3, insight and memory impaired , affect and mood normal    Recent labs in [facility] reviewed by me today.     ASSESSMENT / PLAN:  (U07.1) Infection due to 2019 novel coronavirus  (primary encounter diagnosis)  (R11.2) Nausea and vomiting, unspecified vomiting type  Comment: slowly improving. Continues with intermittent nausea, no vomiting. Completed Paxlovid 7/5/2023. Weight is down 10 lbs.   Plan: zofran daily X 5 days and every 4 hrs prn. Encourage intake. Follow weight. Monitor symptoms   Discussed with macario Vega     (J47.9) Bronchiectasis without acute exacerbation (H)  Comment: cough is close to baseline   Plan: continue tessalon, guaifenesin     (M15.9) Osteoarthritis of multiple joints, unspecified osteoarthritis type  (M51.36) DDD (degenerative disc disease), lumbar  (M54.16) Lumbar radiculopathy  Comment: knee pain improved after steroid injection 6/22/2023  Plan: continue tylenol, tramadol, diclofenac gel. Repeat steroid injections prn     (R41.89) Cognitive impairment  Comment: mild deficits, appears at baseline "   Plan: AL staff assistance with cares, meals, activities, med admin         Electronically signed by: FER Greene CNP             Sincerely,        FER Greene CNP

## 2023-07-24 DIAGNOSIS — J47.9 BRONCHIECTASIS WITHOUT ACUTE EXACERBATION (H): ICD-10-CM

## 2023-07-24 RX ORDER — GUAIFENESIN 100 MG/5ML
LIQUID ORAL
Qty: 473 ML | Refills: 97 | Status: SHIPPED | OUTPATIENT
Start: 2023-07-24 | End: 2023-11-07

## 2023-09-12 ENCOUNTER — ASSISTED LIVING VISIT (OUTPATIENT)
Dept: GERIATRICS | Facility: CLINIC | Age: 88
End: 2023-09-12
Payer: COMMERCIAL

## 2023-09-12 VITALS
WEIGHT: 126 LBS | SYSTOLIC BLOOD PRESSURE: 114 MMHG | RESPIRATION RATE: 20 BRPM | HEART RATE: 87 BPM | TEMPERATURE: 98 F | DIASTOLIC BLOOD PRESSURE: 69 MMHG | BODY MASS INDEX: 23.05 KG/M2

## 2023-09-12 DIAGNOSIS — J47.9 BRONCHIECTASIS WITHOUT ACUTE EXACERBATION (H): ICD-10-CM

## 2023-09-12 DIAGNOSIS — M54.16 LUMBAR RADICULOPATHY: ICD-10-CM

## 2023-09-12 DIAGNOSIS — U07.1 INFECTION DUE TO 2019 NOVEL CORONAVIRUS: ICD-10-CM

## 2023-09-12 DIAGNOSIS — M15.9 OSTEOARTHRITIS OF MULTIPLE JOINTS, UNSPECIFIED OSTEOARTHRITIS TYPE: Primary | ICD-10-CM

## 2023-09-12 DIAGNOSIS — M51.369 DDD (DEGENERATIVE DISC DISEASE), LUMBAR: ICD-10-CM

## 2023-09-12 DIAGNOSIS — R41.89 COGNITIVE IMPAIRMENT: ICD-10-CM

## 2023-09-12 PROCEDURE — 99349 HOME/RES VST EST MOD MDM 40: CPT | Performed by: NURSE PRACTITIONER

## 2023-09-12 NOTE — PROGRESS NOTES
[facility] GERIATRICS    Chief Complaint   Patient presents with    RECHECK     HPI:  Grace Vega is a 89 year old  (3/15/1929), who is being seen today for an episodic care visit at: THE [facility]) [[id number]].   She and her  moved to this facility 6/2022 to be closer to family and for a higher level of care. Previously lived in an apartment in [place] and received care through [facility].   Medical history significant for DDD lumbar with radiculopathy, gait instability, osteoarthritis, hypothyroidism, bronchiectasis, CKD stage 3, LE edema, macular degeneration, cognitive impairment, left peroneal vein DVT 3/2021.   She was seen in the ED 8/26/2022 for a mechanical fall with head strike. She slipped on the wet floor while doing laundry. CT head showed a right frontal scalp hematoma,mild to moderate diffuse cerebral atrophy and mild small vessel ischemic disease, no acute intracranial pathology. CT cervical spine showed multilevel degenerative changes and no acute fracture or subluxation.Facial laceration was repaired with 3 sutures.     Today's concern is:      Osteoarthritis of multiple joints, unspecified osteoarthritis type  DDD (degenerative disc disease), lumbar  Lumbar radiculopathy  Infection due to 2019 novel coronavirus  Bronchiectasis without acute exacerbation (H)  Cognitive impairment  She is a fair historian. Reports bilateral knee pain has been worse the past few days and she hopes it's time for steroid injections. No back pain. No falls. Reports her chronic cough is back to baseline after having COVID. Feeling well. Good appetite. Ambulates with a walker within the apartment, wheelchair for longer distances. Independent with toileting and dressing. Receives assistance with bathing, escorts to meals and med admin by staff.       Allergies, and PMH/PSH reviewed in EPIC today    REVIEW OF SYSTEMS:  4 point ROS including Respiratory, CV, GI and  , other than that noted in the HPI,  is negative    Objective:   /69   Pulse 87   Temp 98  F (36.7  C)   Resp 20   Wt 57.2 kg (126 lb)   BMI 23.05 kg/m    GENERAL APPEARANCE:  Alert, in no distress   ENT:  [place], oropharynx clear  EYES:  conjunctiva and lids normal  NECK:  no adenopathy or thyromegaly   RESP:  lungs clear to auscultation, no crackles or wheezes   CV:  regular rate and rhythm, no murmur, no edema   ABDOMEN:  soft, non-tender, no distension, no masses  M/S: gait slow with walker. Deformity of both knees, no acute inflammation   SKIN:  no rashes or open areas   PSYCH:  oriented X 2-3, insight and memory impaired , affect and mood normal    Recent labs in [facility] reviewed by me today.     ASSESSMENT / PLAN:  (M15.9) Osteoarthritis of multiple joints, unspecified osteoarthritis type  (primary encounter diagnosis)  (M51.36) DDD (degenerative disc disease), lumbar  (M54.16) Lumbar radiculopathy  Comment: chronic pain, limited mobility. Last steroid injections for her knees was 6/22/2023  Plan: continue tylenol, tramadol, diclofenac gel. Refer to [facility] for repeat knee injections. Walker for mobility, wheelchair for distances outside of their apartment   Discussed with step son Olayinka Vega, who agrees with plan of care.     (U07.1) Infection due to 2019 novel coronavirus  Comment: completed Paxlovid 7/5/2023 with resolution   Plan: monitor     (J47.9) Bronchiectasis without acute exacerbation (H)  Comment: chronic and back to baseline   Plan: continue tessalon, guaifenesin     (R41.89) Cognitive impairment  Comment: mild deficits, poor short term memory   Plan: AL staff assistance with cares, meals, activities, med admin         Electronically signed by: FER Greene CNP

## 2023-09-12 NOTE — LETTER
9/12/2023        RE: Grace Vega  [address] [address]        [facility] GERIATRICS    Chief Complaint   Patient presents with    RECHECK     HPI:  Grace Vega is a 89 year old  (3/15/1929), who is being seen today for an episodic care visit at: THE [facility]) [[id number]]. Today's concern is: ***    Allergies, and PMH/PSH reviewed in [facility] today.  REVIEW OF SYSTEMS:  {wudbtw91:897497}    Objective:   /69   Pulse 87   Temp 98  F (36.7  C)   Resp 20   Wt 57.2 kg (126 lb)   BMI 23.05 kg/m    {Nursing home physical exam :172715}    {fgslab:759750}    Assessment/Plan:  {FGS DX2:791267}    MED REC REQUIRED{TIP  Click the link below to document or use med rec list, use list to pull in response :283677}  Post Medication Reconciliation Status: {MED REC LIST:086445}      Orders:  {fgsorders:307540}  ***    Electronically signed by: Cici Osborne ***           Sincerely,        FER Greene CNP

## 2023-09-12 NOTE — Clinical Note
Grace Mon is ready for knee injections whenever you're available.  It doesn't seem like it's been 3 months, does it? Thanks,  Theodore

## 2023-09-13 NOTE — PROGRESS NOTES
[facility] GERIATRICS  [facility] Medical Record Number: [id number]  Place of Service where encounter took place: THE [facility] ([facility]) [[id number]]  Chief Complaint   Patient presents with    RECHECK     HPI:    Grace Vega is a 89 year old (3/15/1929), who is being seen today for an episodic care visit. HPI information obtained from: facility chart records, facility staff, patient report, and [facility] chart review.     Today's concern is:     Osteoarthritis of multiple joints, unspecified osteoarthritis type  Chronic pain of both knees  She is seen today with Solange Carbajal NP for steroid injections to both knees. Has had good relief with injections in the past, last given 6/22/2023.     Past Medical and Surgical History reviewed in Epic today.    MEDICATIONS:  Current Outpatient Medications   Medication Sig Dispense Refill    ACETAMINOPHEN EXTRA STRENGTH 500 MG tablet TAKE TWO TABLETS (1000MG) BY MOUTH IN THE MORNING;& MAY TAKE TWO TABLETS (1000MG) TWICE DAILY AS NEEDED *NOT TO EXCEED 3000MG APAP/ 24 HRS* 540 tablet 11    benzonatate (TESSALON) 100 MG capsule TAKE 1 CAPSULE BY MOUTH TWICE DAILY 56 capsule 97    CHEST CONGESTION RELIEF 100 MG/5ML liquid TAKE 10ML (200MG) BY MOUTH TWICE DAILY SCHEDULED;AND TAKE 10ML (200MG) EVERY 4 HOURS AS NEEDED FOR COUGH 473 mL 97    diclofenac (VOLTAREN) 1 % topical gel Apply 4 g topically 2 times daily To both knees 350 g 4    levothyroxine (SYNTHROID/LEVOTHROID) 88 MCG tablet TAKE 1 TABLET BY MOUTH ONCE DAILY 90 tablet 11    Lidocaine (LIDOCARE) 4 % Patch Place 1 patch onto the skin every 24 hours To prevent lidocaine toxicity, patient should be patch free for 12 hrs daily. Apply to left hip for 12 hrs daily. On in am, off in pm 30 patch 11    Multiple Vitamins-Minerals (PRESERVISION AREDS 2+MULTI VIT) CAPS TAKE 1 CAPSULE BY MOUTH ONCE DAILY 28 capsule 97    ondansetron (ZOFRAN ODT) 4 MG ODT tab 4 mg po tid 30 mins prior to meals for 3 days, then 4 mg po  "every 4 hrs prn nausea/vomiting 20 tablet 1    traMADol (ULTRAM) 50 MG tablet TAKE ONE-HALF TABLET (25MG) BY MOUTH ONCE DAILY;& MAY TAKE ONE-HALF TABLET (25MG) EVERY 6 HOURS AS NEEDED. TAKE ON A FULL STOMACH. **CONTROLLED SUBSTANCE-DOUBLE LOCK STORAGE** 10 tablet 4     REVIEW OF SYSTEMS:  4 point ROS including Respiratory, CV, GI and , other than that noted in the HPI,  is negative    Objective:  /74   Pulse 88   Temp 97.2  F (36.2  C)   Resp 16   Ht 1.575 m (5' 2\")   Wt 58.1 kg (128 lb)   SpO2 92%   BMI 23.41 kg/m      Exam:  GENERAL APPEARANCE:  Alert, in no distress  RESP:  no respiratory distress  M/S:   gait slow with walker. Degenerative changes and valgus deformity of both knees, no acute inflammation   PSYCH:  oriented to 2-3, memory impaired , affect and mood normal    Labs:   Recent labs in [facility] reviewed by me today.     ASSESSMENT / PLAN:  (M15.9) Osteoarthritis of multiple joints, unspecified osteoarthritis type  (primary encounter diagnosis)  (M25.561,  M25.562,  G89.29) Chronic pain of both knees  Comment: chronic pain, affecting her mobility and functional status. Bilateral steroid injections given by Solange Carbajal NP without difficulty, tolerated well   Plan: continue tylenol, tramadol, diclofenac gel. Repeat steroid injections every 3 months.   Discussed with staff         Electronically signed by:  FER Greene CNP  "

## 2023-09-14 ENCOUNTER — ASSISTED LIVING VISIT (OUTPATIENT)
Dept: GERIATRICS | Facility: CLINIC | Age: 88
End: 2023-09-14
Payer: COMMERCIAL

## 2023-09-14 VITALS
WEIGHT: 128 LBS | SYSTOLIC BLOOD PRESSURE: 138 MMHG | BODY MASS INDEX: 23.55 KG/M2 | OXYGEN SATURATION: 92 % | HEART RATE: 88 BPM | TEMPERATURE: 97.2 F | DIASTOLIC BLOOD PRESSURE: 74 MMHG | RESPIRATION RATE: 16 BRPM | HEIGHT: 62 IN

## 2023-09-14 VITALS
HEART RATE: 88 BPM | WEIGHT: 128 LBS | OXYGEN SATURATION: 92 % | RESPIRATION RATE: 16 BRPM | DIASTOLIC BLOOD PRESSURE: 74 MMHG | SYSTOLIC BLOOD PRESSURE: 138 MMHG | BODY MASS INDEX: 23.55 KG/M2 | HEIGHT: 62 IN | TEMPERATURE: 97.2 F

## 2023-09-14 DIAGNOSIS — M17.0 PRIMARY OSTEOARTHRITIS OF BOTH KNEES: Primary | ICD-10-CM

## 2023-09-14 DIAGNOSIS — M25.562 CHRONIC PAIN OF BOTH KNEES: ICD-10-CM

## 2023-09-14 DIAGNOSIS — M25.552 LEFT HIP PAIN: ICD-10-CM

## 2023-09-14 DIAGNOSIS — M25.561 CHRONIC PAIN OF BOTH KNEES: ICD-10-CM

## 2023-09-14 DIAGNOSIS — G89.29 CHRONIC PAIN OF BOTH KNEES: ICD-10-CM

## 2023-09-14 DIAGNOSIS — M15.9 OSTEOARTHRITIS OF MULTIPLE JOINTS, UNSPECIFIED OSTEOARTHRITIS TYPE: Primary | ICD-10-CM

## 2023-09-14 DIAGNOSIS — M25.562 RECURRENT PAIN OF BOTH KNEES: ICD-10-CM

## 2023-09-14 DIAGNOSIS — M25.561 RECURRENT PAIN OF BOTH KNEES: ICD-10-CM

## 2023-09-14 PROCEDURE — 20610 DRAIN/INJ JOINT/BURSA W/O US: CPT | Mod: 50 | Performed by: NURSE PRACTITIONER

## 2023-09-14 PROCEDURE — 99347 HOME/RES VST EST SF MDM 20: CPT | Performed by: NURSE PRACTITIONER

## 2023-09-14 NOTE — LETTER
"    9/14/2023        RE: Grace Vega  [address] [address]        [facility] GERIATRIC SERVICES    Chief Complaint   Patient presents with     Injection       [facility] Medical Record Number:  [id number]  Place of Service where encounter took place:  THE [facility] ([facility]) [[id number]]    HPI:    Grace Vega is a 89 year old  (3/15/1929), who is being seen today for an episodic care visit.  HPI information obtained from: patient report, [facility] chart review, and Theodore Skinner (patient's PCP) .    Today's concern is:  1. Primary osteoarthritis of both knees      Grace [place] in her [place] apartment.  Her , Gary, and PCP Theodore Skinner are both present.  She is ambulating slowly with use of walker, reporting increased bilateral knee pain as well as some L hip pain.  She receives cortisone injections to both knees every three months, injections do provide relief but she can tell when it is nearing the 3 month jaydon because pain returns.  Is managing her pain with acetaminophen 1000mg in AM and BID PRN, diclofenac gel BID, lidocaine patch (L hip), and Tramadol 25mg once daily and q6h PRN.  Her L hip pain has been present for the past couple of weeks, as her knee pain has worsened.  She highlights the L lateral hip as being location of her pain.  Denies falls.  Denies any other orthopedic concerns today.      ALLERGIES: Sulfa antibiotics  Past Medical, Surgical, Family and Social History reviewed and updated in EPIC.      REVIEW OF SYSTEMS:  Focused- musculoskeletal  Positive noted above, otherwise negative    Physical Exam:  /74   Pulse 88   Temp 97.2  F (36.2  C)   Resp 16   Ht 1.575 m (5' 2\")   Wt 58.1 kg (128 lb)   SpO2 92%   BMI 23.41 kg/m      On physical exam of bilateral lower extremities:  Skin: intact  Swelling: present with R>L  Alignment: valgus deformity bilaterally  Tenderness to palpation:  none to knees, " present to L hip (greater trochanter)  Dorsi/Plantar flexion:  5/5  Calves:  Soft and non-tender  Distal pulses are intact and equal bilaterally      Assessment/Plan:  (M17.0) Primary osteoarthritis of both knees  (primary encounter diagnosis)  (M25.561,  M25.562) Recurrent pain of both knees  (M25.552) Left hip pain  Acute on chronic issue.  Bilateral knee pain continues to be relieved by cortisone injections every 3 months.  Patient tolerates injections well and would like to continue with this plan, requests injections today.  L hip pain seems to be related to gait changes d/t pain.  She does have tenderness to palpation of greater trochanter but not exquisitely tender.  Discussed ongoing pain management with lidocaine patch and other modalities she is already using.  If it worsens, could do an injection for L greater troch bursitis.  She was expressing frustration today that she cannot shower without supervision and is not allowed to walk all the way to the dining room (>500 ft).  We discussed that with her knee arthritis being so significant, she would be at risk for pain worsening or buckling of the knee, both of which could very quickly cause a fall.  She verbalizes understanding.  Plan:  -Bilateral cortisone injections- knees- today  -Continue tramadol, acetaminophen, voltaren gel, and lidocaine patch for pain management  -Plan for next set of bilateral knee injections in 3 months (December)  -If L hip pain worsening, get x-rays first and then likely pursue L greater troch bursa injection    Procedure:  After risks, benefits and complications of steroid injection were discussed with the patient she elected to proceed.  Using sterile technique, the area was first prepped with an alcohol swab.  Topical ethyl chloride sprayed as numbing agent to injection site.  A 22 gauge needle was used to inject 40 mg Kenalog and 4 cc of 1% lidocaine into the knee on the right and left medial joint space.  Grace  tolerated  the procedure well without complications.      Injection info:  Lidocaine lot #[id number]  Kenolog lot #[id number] (R knee); #[id number] (L knee)      CPT Code 67537  Electronically signed by  FER Cota CNP       Sincerely,        FER Cota CNP

## 2023-09-14 NOTE — LETTER
"    9/14/2023        RE: Grace Vega  [address] [address]        [facility] GERIATRIC SERVICES    Chief Complaint   Patient presents with     Injection       [facility] Medical Record Number:  [id number]  Place of Service where encounter took place:  THE [facility] ([facility]) [[id number]]    HPI:    Grace Vega is a 89 year old  (3/15/1929), who is being seen today for an episodic care visit.  HPI information obtained from: patient report, [facility] chart review, and Theodore Skinner (patient's PCP) .    Today's concern is:  1. Primary osteoarthritis of both knees      Grace is seen in her [place] apartment.  Her , Gary, and PCP Theodore Skinner are both present.  She is ambulating slowly with use of walker, reporting increased bilateral knee pain as well as some L hip pain.  She receives cortisone injections to both knees every three months, injections do provide relief but she can tell when it is nearing the 3 month jaydon because pain returns.  Is managing her pain with acetaminophen 1000mg in AM and BID PRN, diclofenac gel BID, lidocaine patch (L hip), and Tramadol 25mg once daily and q6h PRN.  Her L hip pain has been present for the past couple of weeks, as her knee pain has worsened.  She highlights the L lateral hip as being location of her pain.  Denies falls.  Denies any other orthopedic concerns today.      ALLERGIES: Sulfa antibiotics  Past Medical, Surgical, Family and Social History reviewed and updated in EPIC.      REVIEW OF SYSTEMS:  Focused- musculoskeletal  Positive noted above, otherwise negative    Physical Exam:  /74   Pulse 88   Temp 97.2  F (36.2  C)   Resp 16   Ht 1.575 m (5' 2\")   Wt 58.1 kg (128 lb)   SpO2 92%   BMI 23.41 kg/m      On physical exam of bilateral lower extremities:  Skin: intact  Swelling: present with R>L  Alignment: valgus deformity bilaterally  Tenderness to palpation:  none to knees, " present to L hip (greater trochanter)  Dorsi/Plantar flexion:  5/5  Calves:  Soft and non-tender  Distal pulses are intact and equal bilaterally      Assessment/Plan:  (M17.0) Primary osteoarthritis of both knees  (primary encounter diagnosis)  (M25.561,  M25.562) Recurrent pain of both knees  (M25.552) Left hip pain  Acute on chronic issue.  Bilateral knee pain continues to be relieved by cortisone injections every 3 months.  Patient tolerates injections well and would like to continue with this plan, requests injections today.  L hip pain seems to be related to gait changes d/t pain.  She does have tenderness to palpation of greater trochanter but not exquisitely tender.  Discussed ongoing pain management with lidocaine patch and other modalities she is already using.  If it worsens, could do an injection for L greater troch bursitis.  She was expressing frustration today that she cannot shower without supervision and is not allowed to walk all the way to the dining room (>500 ft).  We discussed that with her knee arthritis being so significant, she would be at risk for pain worsening or buckling of the knee, both of which could very quickly cause a fall.  She verbalizes understanding.  Plan:  -Bilateral cortisone injections- knees- today  -Continue tramadol, acetaminophen, voltaren gel, and lidocaine patch for pain management  -Plan for next set of bilateral knee injections in 3 months (December)  -If L hip pain worsening, get x-rays first and then likely pursue L greater troch bursa injection    Procedure:  After risks, benefits and complications of steroid injection were discussed with the patient she elected to proceed.  Using sterile technique, the area was first prepped with an alcohol swab.  Topical ethyl chloride sprayed as numbing agent to injection site.  A 22 gauge needle was used to inject 40 mg Kenalog and 4 cc of 1% lidocaine into the knee on the right and left medial joint space.  Grace  tolerated  the procedure well without complications.      Injection info:  Lidocaine lot #[id number]  Kenolog lot #[id number] (R knee); #[id number] (L knee)      CPT Code 05340  Electronically signed by  FER Cota CNP       Sincerely,        FER Cota CNP

## 2023-09-14 NOTE — LETTER
9/14/2023        RE: Grace Vega  [address] [address]        [facility] GERIATRICS  [facility] Medical Record Number: [id number]  Place of Service where encounter took place: THE [facility] ([facility]) [[id number]]  Chief Complaint   Patient presents with     RECHECK     HPI:    Grace Vega is a 89 year old (3/15/1929), who is being seen today for an episodic care visit. HPI information obtained from: facility chart records, facility staff, patient report, and [facility] chart review.     Today's concern is:     Osteoarthritis of multiple joints, unspecified osteoarthritis type  Chronic pain of both knees  She is seen today with Solange Carbajal NP for steroid injections to both knees. Has had good relief with injections in the past, last given 6/22/2023.     Past Medical and Surgical History reviewed in Epic today.    MEDICATIONS:  Current Outpatient Medications   Medication Sig Dispense Refill     ACETAMINOPHEN EXTRA STRENGTH 500 MG tablet TAKE TWO TABLETS (1000MG) BY MOUTH IN THE MORNING;& MAY TAKE TWO TABLETS (1000MG) TWICE DAILY AS NEEDED *NOT TO EXCEED 3000MG APAP/ 24 HRS* 540 tablet 11     benzonatate (TESSALON) 100 MG capsule TAKE 1 CAPSULE BY MOUTH TWICE DAILY 56 capsule 97     CHEST CONGESTION RELIEF 100 MG/5ML liquid TAKE 10ML (200MG) BY MOUTH TWICE DAILY SCHEDULED;AND TAKE 10ML (200MG) EVERY 4 HOURS AS NEEDED FOR COUGH 473 mL 97     diclofenac (VOLTAREN) 1 % topical gel Apply 4 g topically 2 times daily To both knees 350 g 4     levothyroxine (SYNTHROID/LEVOTHROID) 88 MCG tablet TAKE 1 TABLET BY MOUTH ONCE DAILY 90 tablet 11     Lidocaine (LIDOCARE) 4 % Patch Place 1 patch onto the skin every 24 hours To prevent lidocaine toxicity, patient should be patch free for 12 hrs daily. Apply to left hip for 12 hrs daily. On in am, off in pm 30 patch 11     Multiple Vitamins-Minerals (PRESERVISION AREDS 2+MULTI VIT) CAPS TAKE 1 CAPSULE BY MOUTH  "ONCE DAILY 28 capsule 97     ondansetron (ZOFRAN ODT) 4 MG ODT tab 4 mg po tid 30 mins prior to meals for 3 days, then 4 mg po every 4 hrs prn nausea/vomiting 20 tablet 1     traMADol (ULTRAM) 50 MG tablet TAKE ONE-HALF TABLET (25MG) BY MOUTH ONCE DAILY;& MAY TAKE ONE-HALF TABLET (25MG) EVERY 6 HOURS AS NEEDED. TAKE ON A FULL STOMACH. **CONTROLLED SUBSTANCE-DOUBLE LOCK STORAGE** 10 tablet 4     REVIEW OF SYSTEMS:  4 point ROS including Respiratory, CV, GI and , other than that noted in the HPI,  is negative    Objective:  /74   Pulse 88   Temp 97.2  F (36.2  C)   Resp 16   Ht 1.575 m (5' 2\")   Wt 58.1 kg (128 lb)   SpO2 92%   BMI 23.41 kg/m      Exam:  GENERAL APPEARANCE:  Alert, in no distress  RESP:  no respiratory distress  M/S:   gait slow with walker. Degenerative changes and valgus deformity of both knees, no acute inflammation   PSYCH:  oriented to 2-3, memory impaired , affect and mood normal    Labs:   Recent labs in [facility] reviewed by me today.     ASSESSMENT / PLAN:  (M15.9) Osteoarthritis of multiple joints, unspecified osteoarthritis type  (primary encounter diagnosis)  (M25.561,  M25.562,  G89.29) Chronic pain of both knees  Comment: chronic pain, affecting her mobility and functional status. Bilateral steroid injections given by Solange Carbajal NP without difficulty, tolerated well   Plan: continue tylenol, tramadol, diclofenac gel. Repeat steroid injections every 3 months.   Discussed with staff         Electronically signed by:  FER Greene CNP    Sincerely,        FER Greene CNP      "

## 2023-09-18 DIAGNOSIS — M15.9 OSTEOARTHRITIS OF MULTIPLE JOINTS, UNSPECIFIED OSTEOARTHRITIS TYPE: ICD-10-CM

## 2023-09-19 NOTE — PROGRESS NOTES
"[facility] GERIATRIC SERVICES    Chief Complaint   Patient presents with    Injection       [facility] Medical Record Number:  [id number]  Place of Service where encounter took place:  [facility]) [[id number]]    HPI:    Grace Vega is a 89 year old  (3/15/1929), who is being seen today for an episodic care visit.  HPI information obtained from: patient report, [facility] chart review, and Theodore Skinner (patient's PCP) .    Today's concern is:  1. Primary osteoarthritis of both knees      Grace is seen in her [place] apartment.  Her , Gary, and PCP Theodore Skinner are both present.  She is ambulating slowly with use of walker, reporting increased bilateral knee pain as well as some L hip pain.  She receives cortisone injections to both knees every three months, injections do provide relief but she can tell when it is nearing the 3 month jaydon because pain returns.  Is managing her pain with acetaminophen 1000mg in AM and BID PRN, diclofenac gel BID, lidocaine patch (L hip), and Tramadol 25mg once daily and q6h PRN.  Her L hip pain has been present for the past couple of weeks, as her knee pain has worsened.  She highlights the L lateral hip as being location of her pain.  Denies falls.  Denies any other orthopedic concerns today.      ALLERGIES: Sulfa antibiotics  Past Medical, Surgical, Family and Social History reviewed and updated in EPIC.      REVIEW OF SYSTEMS:  Focused- musculoskeletal  Positive noted above, otherwise negative    Physical Exam:  /74   Pulse 88   Temp 97.2  F (36.2  C)   Resp 16   Ht 1.575 m (5' 2\")   Wt 58.1 kg (128 lb)   SpO2 92%   BMI 23.41 kg/m      On physical exam of bilateral lower extremities:  Skin: intact  Swelling: present with R>L  Alignment: valgus deformity bilaterally  Tenderness to palpation:  none to knees, present to L hip (greater trochanter)  Dorsi/Plantar flexion:  5/5  Calves:  Soft and non-tender  Distal pulses are " intact and equal bilaterally      Assessment/Plan:  (M17.0) Primary osteoarthritis of both knees  (primary encounter diagnosis)  (M25.561,  M25.562) Recurrent pain of both knees  (M25.552) Left hip pain  Acute on chronic issue.  Bilateral knee pain continues to be relieved by cortisone injections every 3 months.  Patient tolerates injections well and would like to continue with this plan, requests injections today.  L hip pain seems to be related to gait changes d/t pain.  She does have tenderness to palpation of greater trochanter but not exquisitely tender.  Discussed ongoing pain management with lidocaine patch and other modalities she is already using.  If it worsens, could do an injection for L greater troch bursitis.  She was expressing frustration today that she cannot shower without supervision and is not allowed to walk all the way to the dining room (>500 ft).  We discussed that with her knee arthritis being so significant, she would be at risk for pain worsening or buckling of the knee, both of which could very quickly cause a fall.  She verbalizes understanding.  Plan:  -Bilateral cortisone injections- knees- today  -Continue tramadol, acetaminophen, voltaren gel, and lidocaine patch for pain management  -Plan for next set of bilateral knee injections in 3 months (December)  -If L hip pain worsening, get x-rays first and then likely pursue L greater troch bursa injection    Procedure:  After risks, benefits and complications of steroid injection were discussed with the patient she elected to proceed.  Using sterile technique, the area was first prepped with an alcohol swab.  Topical ethyl chloride sprayed as numbing agent to injection site.  A 22 gauge needle was used to inject 40 mg Kenalog and 4 cc of 1% lidocaine into the knee on the right and left medial joint space.  Grace  tolerated the procedure well without complications.      Injection info:  Lidocaine lot #[id number]  Kenolog lot #[id number] (R  knee); #[id number] (L knee)      CPT Code 46800  Electronically signed by  FER Cota CNP

## 2023-11-06 DIAGNOSIS — M51.369 DDD (DEGENERATIVE DISC DISEASE), LUMBAR: ICD-10-CM

## 2023-11-06 DIAGNOSIS — M15.9 OSTEOARTHRITIS OF MULTIPLE JOINTS, UNSPECIFIED OSTEOARTHRITIS TYPE: ICD-10-CM

## 2023-11-06 RX ORDER — TRAMADOL HYDROCHLORIDE 50 MG/1
TABLET ORAL
Qty: 60 TABLET | Refills: 4 | Status: SHIPPED | OUTPATIENT
Start: 2023-11-06

## 2023-11-07 ENCOUNTER — ASSISTED LIVING VISIT (OUTPATIENT)
Dept: GERIATRICS | Facility: CLINIC | Age: 88
End: 2023-11-07
Payer: COMMERCIAL

## 2023-11-07 VITALS
SYSTOLIC BLOOD PRESSURE: 149 MMHG | BODY MASS INDEX: 24.87 KG/M2 | HEART RATE: 87 BPM | TEMPERATURE: 97.2 F | DIASTOLIC BLOOD PRESSURE: 88 MMHG | WEIGHT: 136 LBS | RESPIRATION RATE: 16 BRPM

## 2023-11-07 DIAGNOSIS — M51.369 DDD (DEGENERATIVE DISC DISEASE), LUMBAR: ICD-10-CM

## 2023-11-07 DIAGNOSIS — M54.16 LUMBAR RADICULOPATHY: ICD-10-CM

## 2023-11-07 DIAGNOSIS — N18.32 STAGE 3B CHRONIC KIDNEY DISEASE (H): ICD-10-CM

## 2023-11-07 DIAGNOSIS — G89.29 CHRONIC PAIN OF BOTH KNEES: ICD-10-CM

## 2023-11-07 DIAGNOSIS — M25.561 CHRONIC PAIN OF BOTH KNEES: ICD-10-CM

## 2023-11-07 DIAGNOSIS — J47.1 BRONCHIECTASIS WITH ACUTE EXACERBATION (H): Primary | ICD-10-CM

## 2023-11-07 DIAGNOSIS — M15.9 OSTEOARTHRITIS OF MULTIPLE JOINTS, UNSPECIFIED OSTEOARTHRITIS TYPE: ICD-10-CM

## 2023-11-07 DIAGNOSIS — M25.562 CHRONIC PAIN OF BOTH KNEES: ICD-10-CM

## 2023-11-07 DIAGNOSIS — R41.89 COGNITIVE IMPAIRMENT: ICD-10-CM

## 2023-11-07 PROCEDURE — 99350 HOME/RES VST EST HIGH MDM 60: CPT | Performed by: NURSE PRACTITIONER

## 2023-11-07 RX ORDER — AZITHROMYCIN 250 MG/1
TABLET, FILM COATED ORAL
Qty: 6 TABLET | Refills: 0 | Status: SHIPPED | OUTPATIENT
Start: 2023-11-07 | End: 2023-11-12

## 2023-11-07 RX ORDER — GUAIFENESIN 200 MG/10ML
200 LIQUID ORAL EVERY 4 HOURS PRN
Qty: 473 ML | Refills: 97 | Status: SHIPPED | OUTPATIENT
Start: 2023-11-07 | End: 2024-02-15

## 2023-11-07 RX ORDER — ALBUTEROL SULFATE 90 UG/1
2 AEROSOL, METERED RESPIRATORY (INHALATION) 4 TIMES DAILY PRN
Qty: 18 G | Refills: 4 | Status: SHIPPED | OUTPATIENT
Start: 2023-11-07

## 2023-11-07 RX ORDER — GUAIFENESIN 600 MG/1
600 TABLET, EXTENDED RELEASE ORAL 2 TIMES DAILY
Qty: 60 TABLET | Refills: 11 | Status: SHIPPED | OUTPATIENT
Start: 2023-11-07 | End: 2024-10-04

## 2023-11-07 NOTE — PROGRESS NOTES
"[facility] GERIATRICS    Chief Complaint   Patient presents with    RECHECK     HPI:  Grace Vega is a 89 year old  (3/15/1929), who is being seen today for an episodic care visit at: THE [facility]) [[id number]].   She and her  moved to this facility 6/2022 to be closer to family and for a higher level of care. Previously lived in an apartment in [place] and received care through [facility].   Medical history significant for DDD lumbar with radiculopathy, gait instability, osteoarthritis, hypothyroidism, bronchiectasis, CKD stage 3, LE edema, macular degeneration, cognitive impairment, left peroneal vein DVT 3/2021.   She was seen in the ED 8/26/2022 for a mechanical fall with head strike. She slipped on the wet floor while doing laundry. CT head showed a right frontal scalp hematoma,mild to moderate diffuse cerebral atrophy and mild small vessel ischemic disease, no acute intracranial pathology. CT cervical spine showed multilevel degenerative changes and no acute fracture or subluxation.   She had COVID-19 6/2023 and was treated with Paxlovid.     Today's concern is:      Bronchiectasis with acute exacerbation (H)  Osteoarthritis of multiple joints, unspecified osteoarthritis type  Chronic pain of both knees  DDD (degenerative disc disease), lumbar  Lumbar radiculopathy  Stage 3b chronic kidney disease (H)  Cognitive impairment  She is seen today for increased cough and chest congestion for the past several days. COVID-19 negative on 10/30/2023. CXR 10/31/2023 showed mild cardiomegaly and findings consistent with COPD. She reports a frequent \"harsh\" cough that keeps her and her  awake at night. Cough is nonproductive, denies sore throat or other symptoms. Afebrile, no hypoxia. Feels tired from not sleeping, but denies feeling ill. Reports she has been on inhalers in the past and doesn't think they were helpful, though she isn't sure. She had steroid " injections to both knees 9/14/2023 with good results. Ambulates with a walker within their apartment, wheelchair for longer distances. Receives assistance with bathing. Independent with dressing and toileting.     Allergies, and PMH/PSH reviewed in EPIC today    REVIEW OF SYSTEMS:  4 point ROS including Respiratory, CV, GI and , other than that noted in the HPI,  is negative    Objective:   BP (!) 149/88   Pulse 87   Temp 97.2  F (36.2  C)   Resp 16   Wt 61.7 kg (136 lb)   BMI 24.87 kg/m    GENERAL APPEARANCE:  Alert, in no distress, appears fatigued   ENT:  Ute, oropharynx clear  EYES:  conjunctiva and lids normal  NECK:  no adenopathy or thyromegaly   RESP:  lungs with scattered crackles bilaterally, frequent cough  CV:  regular rate and rhythm, no murmur, no edema   ABDOMEN:  soft, non-tender, no distension, no masses  M/S: gait slow with walker. Deformity of both knees, no acute inflammation   SKIN:  no rashes or open areas   PSYCH:  oriented X 2-3, insight and memory impaired , affect and mood normal    Recent labs in [facility] reviewed by me today.     ASSESSMENT / PLAN:  (J47.1) Bronchiectasis with acute exacerbation (H)  (primary encounter diagnosis)  Comment: exacerbation of chronic cough. She does not appear acutely ill.   Plan: azithromycin X 5 days, mucinex 600 mg bid and albuterol inhaler prn. DISCONTINUE tessalon perles. Guaifenesin prn. CBC. Closely monitor symptoms   Message left for her stepson Olayinka Vega  Discussed with staff     (M15.9) Osteoarthritis of multiple joints, unspecified osteoarthritis type  (M25.561,  M25.562,  G89.29) Chronic pain of both knees  (M51.36) DDD (degenerative disc disease), lumbar   (M54.16) Lumbar radiculopathy  Comment: knee pain improved with steroid injections 9/14/2023  Plan: continue tylenol, diclofenac gel, tramadol. Repeat steroid injection prn     (N18.32) Stage 3b chronic kidney disease (H)  Comment: creatinine 1.46 on 6/29/2023, baseline   Plan: BMP. Avoid  nephrotoxins, renal dose meds     (R41.89) Cognitive impairment  Comment: mild deficits, appears at baseline   Plan: [place] staff assistance with cares, meals, activities, med admin   :    Electronically signed by: FER Greene CNP

## 2023-11-07 NOTE — LETTER
"    11/7/2023        RE: Grace Vega  [facility]  [address] [address]        [facility] GERIATRICS    Chief Complaint   Patient presents with     RECHECK     HPI:  Grace Vega is a 89 year old  (3/15/1929), who is being seen today for an episodic care visit at: THE [facility] ([facility]) [[id number]].   She and her  moved to this facility 6/2022 to be closer to family and for a higher level of care. Previously lived in an apartment in [place] and received care through [facility].   Medical history significant for DDD lumbar with radiculopathy, gait instability, osteoarthritis, hypothyroidism, bronchiectasis, CKD stage 3, LE edema, macular degeneration, cognitive impairment, left peroneal vein DVT 3/2021.   She was seen in the ED 8/26/2022 for a mechanical fall with head strike. She slipped on the wet floor while doing laundry. CT head showed a right frontal scalp hematoma,mild to moderate diffuse cerebral atrophy and mild small vessel ischemic disease, no acute intracranial pathology. CT cervical spine showed multilevel degenerative changes and no acute fracture or subluxation.   She had COVID-19 6/2023 and was treated with Paxlovid.     Today's concern is:      Bronchiectasis with acute exacerbation (H)  Osteoarthritis of multiple joints, unspecified osteoarthritis type  Chronic pain of both knees  DDD (degenerative disc disease), lumbar  Lumbar radiculopathy  Stage 3b chronic kidney disease (H)  Cognitive impairment  She is seen today for increased cough and chest congestion for the past several days. COVID-19 negative on 10/30/2023. CXR 10/31/2023 showed mild cardiomegaly and findings consistent with COPD. She reports a frequent \"harsh\" cough that keeps her and her  awake at night. Cough is nonproductive, denies sore throat or other symptoms. Afebrile, no hypoxia. Feels tired from not sleeping, but denies feeling ill. " Reports she has been on inhalers in the past and doesn't think they were helpful, though she isn't sure. She had steroid injections to both knees 9/14/2023 with good results. Ambulates with a walker within their apartment, wheelchair for longer distances. Receives assistance with bathing. Independent with dressing and toileting.     Allergies, and PMH/PSH reviewed in [facility] today    REVIEW OF SYSTEMS:  4 point ROS including Respiratory, CV, GI and , other than that noted in the HPI,  is negative    Objective:   BP (!) 149/88   Pulse 87   Temp 97.2  F (36.2  C)   Resp 16   Wt 61.7 kg (136 lb)   BMI 24.87 kg/m    GENERAL APPEARANCE:  Alert, in no distress, appears fatigued   ENT:  [place], oropharynx clear  EYES:  conjunctiva and lids normal  NECK:  no adenopathy or thyromegaly   RESP:  lungs with scattered crackles bilaterally, frequent cough  CV:  regular rate and rhythm, no murmur, no edema   ABDOMEN:  soft, non-tender, no distension, no masses  M/S: gait slow with walker. Deformity of both knees, no acute inflammation   SKIN:  no rashes or open areas   PSYCH:  oriented X 2-3, insight and memory impaired , affect and mood normal    Recent labs in [facility] reviewed by me today.     ASSESSMENT / PLAN:  (J47.1) Bronchiectasis with acute exacerbation (H)  (primary encounter diagnosis)  Comment: exacerbation of chronic cough. She does not appear acutely ill.   Plan: azithromycin X 5 days, mucinex 600 mg bid and albuterol inhaler prn. DISCONTINUE tessalon perles. Guaifenesin prn. CBC. Closely monitor symptoms   Message left for her stepson Olayinka Vega  Discussed with staff     (M15.9) Osteoarthritis of multiple joints, unspecified osteoarthritis type  (M25.561,  M25.562,  G89.29) Chronic pain of both knees  (M51.36) DDD (degenerative disc disease), lumbar   (M54.16) Lumbar radiculopathy  Comment: knee pain improved with steroid injections 9/14/2023  Plan: continue tylenol, diclofenac gel, tramadol. Repeat steroid injection  prn     (N18.32) Stage 3b chronic kidney disease (H)  Comment: creatinine 1.46 on 6/29/2023, baseline   Plan: BMP. Avoid nephrotoxins, renal dose meds     (R41.89) Cognitive impairment  Comment: mild deficits, appears at baseline   Plan: AL staff assistance with cares, meals, activities, med admin   :    Electronically signed by: FER Greene CNP           Sincerely,        FER Greene CNP

## 2023-11-08 ENCOUNTER — LAB REQUISITION (OUTPATIENT)
Dept: LAB | Facility: CLINIC | Age: 88
End: 2023-11-08
Payer: COMMERCIAL

## 2023-11-08 DIAGNOSIS — J47.9 BRONCHIECTASIS, UNCOMPLICATED (H): ICD-10-CM

## 2023-11-09 LAB
ANION GAP SERPL CALCULATED.3IONS-SCNC: 14 MMOL/L (ref 7–15)
BUN SERPL-MCNC: 25.3 MG/DL (ref 8–23)
CALCIUM SERPL-MCNC: 9.2 MG/DL (ref 8.2–9.6)
CHLORIDE SERPL-SCNC: 105 MMOL/L (ref 98–107)
CREAT SERPL-MCNC: 1.46 MG/DL (ref 0.51–0.95)
DEPRECATED HCO3 PLAS-SCNC: 21 MMOL/L (ref 22–29)
EGFRCR SERPLBLD CKD-EPI 2021: 33 ML/MIN/1.73M2
ERYTHROCYTE [DISTWIDTH] IN BLOOD BY AUTOMATED COUNT: 13.9 % (ref 10–15)
GLUCOSE SERPL-MCNC: 108 MG/DL (ref 70–99)
HCT VFR BLD AUTO: 40.8 % (ref 35–47)
HGB BLD-MCNC: 13.4 G/DL (ref 11.7–15.7)
MCH RBC QN AUTO: 33.9 PG (ref 26.5–33)
MCHC RBC AUTO-ENTMCNC: 32.8 G/DL (ref 31.5–36.5)
MCV RBC AUTO: 103 FL (ref 78–100)
PLATELET # BLD AUTO: 269 10E3/UL (ref 150–450)
POTASSIUM SERPL-SCNC: 4.4 MMOL/L (ref 3.4–5.3)
RBC # BLD AUTO: 3.95 10E6/UL (ref 3.8–5.2)
SODIUM SERPL-SCNC: 140 MMOL/L (ref 135–145)
WBC # BLD AUTO: 9.1 10E3/UL (ref 4–11)

## 2023-11-09 PROCEDURE — P9603 ONE-WAY ALLOW PRORATED MILES: HCPCS | Mod: ORL | Performed by: NURSE PRACTITIONER

## 2023-11-09 PROCEDURE — 36415 COLL VENOUS BLD VENIPUNCTURE: CPT | Mod: ORL | Performed by: NURSE PRACTITIONER

## 2023-11-09 PROCEDURE — 80048 BASIC METABOLIC PNL TOTAL CA: CPT | Mod: ORL | Performed by: NURSE PRACTITIONER

## 2023-11-09 PROCEDURE — 85027 COMPLETE CBC AUTOMATED: CPT | Mod: ORL | Performed by: NURSE PRACTITIONER

## 2023-11-14 ENCOUNTER — ASSISTED LIVING VISIT (OUTPATIENT)
Dept: GERIATRICS | Facility: CLINIC | Age: 88
End: 2023-11-14
Payer: COMMERCIAL

## 2023-11-14 VITALS
DIASTOLIC BLOOD PRESSURE: 88 MMHG | SYSTOLIC BLOOD PRESSURE: 149 MMHG | BODY MASS INDEX: 24.87 KG/M2 | TEMPERATURE: 97.2 F | HEART RATE: 87 BPM | WEIGHT: 136 LBS | RESPIRATION RATE: 16 BRPM

## 2023-11-14 DIAGNOSIS — J47.1 BRONCHIECTASIS WITH ACUTE EXACERBATION (H): Primary | ICD-10-CM

## 2023-11-14 PROCEDURE — 99348 HOME/RES VST EST LOW MDM 30: CPT | Performed by: NURSE PRACTITIONER

## 2023-11-14 NOTE — PROGRESS NOTES
"[facility] GERIATRICS    Chief Complaint   Patient presents with    RECHECK     HPI:  Grace Vega is a 89 year old  (3/15/1929), who is being seen today for an episodic care visit at: THE [facility]) [[id number]].   She and her  moved to this facility 6/2022 to be closer to family and for a higher level of care. Previously lived in an apartment in [place] and received care through [facility].   Medical history significant for DDD lumbar with radiculopathy, gait instability, osteoarthritis, hypothyroidism, bronchiectasis, CKD stage 3, LE edema, macular degeneration, cognitive impairment, left peroneal vein DVT 3/2021.   She was seen in the ED 8/26/2022 for a mechanical fall with head strike. She slipped on the wet floor while doing laundry. CT head showed a right frontal scalp hematoma,mild to moderate diffuse cerebral atrophy and mild small vessel ischemic disease, no acute intracranial pathology. CT cervical spine showed multilevel degenerative changes and no acute fracture or subluxation.   She had COVID-19 6/2023 and was treated with Paxlovid.     Today's concern is:  Bronchiectasis with acute exacerbation (H)  She is seen today for follow up of worsening cough and bronchiectasis exacerbation. Azithromycin, mucinex and albuterol prn were started last week. She reports her cough is \"much better.\" States that she's able to sleep through the night and isn't waking her  with her coughing (which he confirms). Denies fever or chills.     Allergies, and PMH/PSH reviewed in EPIC today    REVIEW OF SYSTEMS:  4 point ROS including Respiratory, CV, GI and , other than that noted in the HPI,  is negative    Objective:   BP (!) 149/88   Pulse 87   Temp 97.2  F (36.2  C)   Resp 16   Wt 61.7 kg (136 lb)   BMI 24.87 kg/m    GENERAL APPEARANCE:  Alert, in no distress   ENT:  [place], oropharynx clear  EYES:  conjunctiva and lids normal  RESP:  lungs clear to " auscultation bilaterally   CV:  regular rate and rhythm, no murmur, no edema   ABDOMEN:  soft, non-tender, no distension, no masses  M/S: wheelchair. LOPEZ.  Deformity of both knees, no acute inflammation   SKIN:  no rashes or open areas   PSYCH:  oriented X 2-3, insight and memory impaired , affect and mood normal    Recent labs in [facility] reviewed by me today.     ASSESSMENT / PLAN:  (J47.1) Bronchiectasis with acute exacerbation (H)  (primary encounter diagnosis)  Comment: chronic cough improved. She appears well today. Azithromycin was completed 11/12/2023  Plan: continue mucinex, albuterol inhaler prn, guaifenesin prn.       Electronically signed by: FER Greene CNP

## 2023-11-14 NOTE — LETTER
11/14/2023        RE: Grace Vega  [id number] [place] [address] GERIATRICS    Chief Complaint   Patient presents with    RECHECK     HPI:  Grace Vega is a 89 year old  (3/15/1929), who is being seen today for an episodic care visit at: THE [facility] ([facility]) [[id number]]. Today's concern is: ***    Allergies, and PMH/PSH reviewed in [facility] today.  REVIEW OF SYSTEMS:  {yyeajl37:054149}    Objective:   BP (!) 149/88   Pulse 87   Temp 97.2  F (36.2  C)   Resp 16   Wt 61.7 kg (136 lb)   BMI 24.87 kg/m    {Nursing home physical exam :398885}    {fgslab:474840}    Assessment/Plan:  {FGS DX2:219421}    MED REC REQUIRED{TIP  Click the link below to document or use med rec list, use list to pull in response :966522}  Post Medication Reconciliation Status: {MED REC LIST:647337}      Orders:  {fgsorders:189777}  ***    Electronically signed by: Cici Osborne ***           Sincerely,        FER Greene CNP

## 2023-12-22 ENCOUNTER — TELEPHONE (OUTPATIENT)
Dept: GERIATRICS | Facility: CLINIC | Age: 88
End: 2023-12-22
Payer: COMMERCIAL

## 2023-12-22 DIAGNOSIS — R52 PAIN: ICD-10-CM

## 2023-12-22 RX ORDER — PSEUDOEPHED/ACETAMINOPH/DIPHEN 30MG-500MG
1000 TABLET ORAL 2 TIMES DAILY
Qty: 180 TABLET | Refills: 11 | Status: SHIPPED | OUTPATIENT
Start: 2023-12-22

## 2023-12-22 NOTE — TELEPHONE ENCOUNTER
Grace Vega    3/15/1929    Orders 2023  Increase tylenol to 1000 mg po bid plus 1000 mg po daily prn pain   Sent to pharmacy      FER Greene CNP on 2023 at 2:36 PM

## 2023-12-22 NOTE — Clinical Note
Lalo Narvaez,  You may remember this lady at [facility]. She's due for bilateral knee injections.  Thanks,  Theodore

## 2023-12-28 ENCOUNTER — ASSISTED LIVING VISIT (OUTPATIENT)
Dept: GERIATRICS | Facility: CLINIC | Age: 88
End: 2023-12-28
Payer: COMMERCIAL

## 2023-12-28 VITALS
TEMPERATURE: 97.2 F | RESPIRATION RATE: 16 BRPM | OXYGEN SATURATION: 92 % | BODY MASS INDEX: 25.03 KG/M2 | SYSTOLIC BLOOD PRESSURE: 149 MMHG | HEIGHT: 62 IN | DIASTOLIC BLOOD PRESSURE: 88 MMHG | WEIGHT: 136 LBS | HEART RATE: 87 BPM

## 2023-12-28 DIAGNOSIS — M25.562 CHRONIC PAIN OF BOTH KNEES: ICD-10-CM

## 2023-12-28 DIAGNOSIS — M17.0 PRIMARY OSTEOARTHRITIS OF BOTH KNEES: Primary | ICD-10-CM

## 2023-12-28 DIAGNOSIS — G89.29 CHRONIC PAIN OF BOTH KNEES: ICD-10-CM

## 2023-12-28 DIAGNOSIS — M25.561 CHRONIC PAIN OF BOTH KNEES: ICD-10-CM

## 2023-12-28 PROCEDURE — 20610 DRAIN/INJ JOINT/BURSA W/O US: CPT | Mod: RT | Performed by: NURSE PRACTITIONER

## 2023-12-28 NOTE — PROGRESS NOTES
"[facility] GERIATRICS    Chief Complaint   Patient presents with    RECHECK     Ortho - bilat knee injections     HPI:  Grace Vega is a 89 year old  (3/15/1929), who is being seen today for an ORTHO episodic care visit at: [facility] ([facility]) [[id number]].     Today's Visit:  Grace is seen today for repeat bilateral knee injections for known OA.  Her , three additional family members (visiting for the holidays) and PCP are present as well.  She receives these injections routinely every three months, last done 9/14/23.  Both patient and PCP are able to tell when the three months is almost up as her pain increases.  She also has trouble with L hip pain but is not a surgical candidate.  Presently manages her pain with acetaminophen 1000mg in AM and BID PRN, diclofenac gel BID, lidocaine patch (L hip), and Tramadol 25mg once daily and q6h PRN.  Feels this regimen works well.  Ambulates with use of walker. No recent falls.      Allergies, and PMH/PSH reviewed in [employer] today.  REVIEW OF SYSTEMS:  Focused- musculoskeletal  Positive noted above, otherwise negative    Objective:   BP (!) 149/88   Pulse 87   Temp 97.2  F (36.2  C)   Resp 16   Ht 1.575 m (5' 2\")   Wt 61.7 kg (136 lb)   SpO2 92%   BMI 24.87 kg/m    GENERAL APPEARANCE:  Alert, in no distress, cooperative    M/S:     On physical exam of bilateral lower extremities:  Skin: intact  Swelling: none  Alignment: valgus deformity bilaterally  Tenderness to palpation:  none   Dorsi/Plantar flexion:  5/5  Calves:  Soft and non-tender  Distal pulses are intact and equal bilaterally  ROM:  flexion to 115, extension lacking ~10 degrees.    Pain with extension, no pain with flexion.    PSYCH:  oriented X 3        Assessment/Plan:  (M17.0) Primary osteoarthritis of both knees  (primary encounter diagnosis)  (M25.561,  M25.562,  G89.29) Chronic pain of both knees  Acute on chronic.  Ongoing symptom relief with cortisone injections every " three months.  She is interested in pursuing both knee injections again today.    Plan:   -Bilateral cortisone injections- knees- today  -Continue tramadol, acetaminophen, voltaren gel, and lidocaine patch for pain management  -Plan for next set of bilateral knee injections in 3 months (March)      Procedure:  After risks, benefits and complications of steroid injection were discussed with the patient she elected to proceed.  Using sterile technique, the area was first prepped with an alcohol swab.  Topical ethyl chloride sprayed as numbing agent to injection site.  A 22 gauge needle was used to inject 40 mg Kenalog and 4 cc of 1% lidocaine into the knee on the right and left medial joint space.  Grace  tolerated the procedure well without complications.    Lidocaine batch#:  [id number]  DepoMedrol lot#: [id number]    CPT Code:  58664       Electronically signed by: FER Cota CNP

## 2023-12-28 NOTE — LETTER
"    12/28/2023        RE: Grace Vega  [address] [address]        [facility]    Chief Complaint   Patient presents with     RECHECK     Ortho - bilat knee injections     HPI:  Grace Vega is a 89 year old  (3/15/1929), who is being seen today for an ORTHO episodic care visit at: THE [facility] ([facility]) [[id number]].     Today's Visit:  Grace is seen today for repeat bilateral knee injections for known OA.  Her , three additional family members (visiting for the holidays) and PCP are present as well.  She receives these injections routinely every three months, last done 9/14/23.  Both patient and PCP are able to tell when the three months is almost up as her pain increases.  She also has trouble with L hip pain but is not a surgical candidate.  Presently manages her pain with acetaminophen 1000mg in AM and BID PRN, diclofenac gel BID, lidocaine patch (L hip), and Tramadol 25mg once daily and q6h PRN.  Feels this regimen works well.  Ambulates with use of walker. No recent falls.      Allergies, and PMH/PSH reviewed in EPIC today.  REVIEW OF SYSTEMS:  Focused- musculoskeletal  Positive noted above, otherwise negative    Objective:   BP (!) 149/88   Pulse 87   Temp 97.2  F (36.2  C)   Resp 16   Ht 1.575 m (5' 2\")   Wt 61.7 kg (136 lb)   SpO2 92%   BMI 24.87 kg/m    GENERAL APPEARANCE:  Alert, in no distress, cooperative    M/S:     On physical exam of bilateral lower extremities:  Skin: intact  Swelling: none  Alignment: valgus deformity bilaterally  Tenderness to palpation:  none   Dorsi/Plantar flexion:  5/5  Calves:  Soft and non-tender  Distal pulses are intact and equal bilaterally  ROM:  flexion to 115, extension lacking ~10 degrees.    Pain with extension, no pain with flexion.    PSYCH:  oriented X 3        Assessment/Plan:  (M17.0) Primary osteoarthritis of both knees  (primary encounter diagnosis)  (M25.561,  " M25.562,  G89.29) Chronic pain of both knees  Acute on chronic.  Ongoing symptom relief with cortisone injections every three months.  She is interested in pursuing both knee injections again today.    Plan:   -Bilateral cortisone injections- knees- today  -Continue tramadol, acetaminophen, voltaren gel, and lidocaine patch for pain management  -Plan for next set of bilateral knee injections in 3 months (March)      Procedure:  After risks, benefits and complications of steroid injection were discussed with the patient she elected to proceed.  Using sterile technique, the area was first prepped with an alcohol swab.  Topical ethyl chloride sprayed as numbing agent to injection site.  A 22 gauge needle was used to inject 40 mg Kenalog and 4 cc of 1% lidocaine into the knee on the right and left medial joint space.  Grace  tolerated the procedure well without complications.    Lidocaine batch#:  [id number]  DepoMedrol lot#: [id number]    CPT Code:  48633       Electronically signed by: FER Cota CNP          Sincerely,        FER Cota CNP

## 2023-12-28 NOTE — LETTER
"    12/28/2023        RE: Grace Vega  [address] [address]        [facility]    Chief Complaint   Patient presents with     RECHECK     Ortho - bilat knee injections     HPI:  Grace Vega is a 89 year old  (3/15/1929), who is being seen today for an ORTHO episodic care visit at: THE [facility] ([facility]) [[id number]].     Today's Visit:  Grace is seen today for repeat bilateral knee injections for known OA.  Her , three additional family members (visiting for the holidays) and PCP are present as well.  She receives these injections routinely every three months, last done 9/14/23.  Both patient and PCP are able to tell when the three months is almost up as her pain increases.  She also has trouble with L hip pain but is not a surgical candidate.  Presently manages her pain with acetaminophen 1000mg in AM and BID PRN, diclofenac gel BID, lidocaine patch (L hip), and Tramadol 25mg once daily and q6h PRN.  Feels this regimen works well.  Ambulates with use of walker. No recent falls.      Allergies, and PMH/PSH reviewed in EPIC today.  REVIEW OF SYSTEMS:  Focused- musculoskeletal  Positive noted above, otherwise negative    Objective:   BP (!) 149/88   Pulse 87   Temp 97.2  F (36.2  C)   Resp 16   Ht 1.575 m (5' 2\")   Wt 61.7 kg (136 lb)   SpO2 92%   BMI 24.87 kg/m    GENERAL APPEARANCE:  Alert, in no distress, cooperative    M/S:     On physical exam of bilateral lower extremities:  Skin: intact  Swelling: none  Alignment: valgus deformity bilaterally  Tenderness to palpation:  none   Dorsi/Plantar flexion:  5/5  Calves:  Soft and non-tender  Distal pulses are intact and equal bilaterally  ROM:  flexion to 115, extension lacking ~10 degrees.    Pain with extension, no pain with flexion.    PSYCH:  oriented X 3        Assessment/Plan:  (M17.0) Primary osteoarthritis of both knees  (primary encounter diagnosis)  (M25.561,  " M25.562,  G89.29) Chronic pain of both knees  Acute on chronic.  Ongoing symptom relief with cortisone injections every three months.  She is interested in pursuing both knee injections again today.    Plan:   -Bilateral cortisone injections- knees- today  -Continue tramadol, acetaminophen, voltaren gel, and lidocaine patch for pain management  -Plan for next set of bilateral knee injections in 3 months (March)      Procedure:  After risks, benefits and complications of steroid injection were discussed with the patient she elected to proceed.  Using sterile technique, the area was first prepped with an alcohol swab.  Topical ethyl chloride sprayed as numbing agent to injection site.  A 22 gauge needle was used to inject 40 mg Kenalog and 4 cc of 1% lidocaine into the knee on the right and left medial joint space.  Grace  tolerated the procedure well without complications.    Lidocaine batch#:  [id number]  DepoMedrol lot#: [id number]    CPT Code:  32980       Electronically signed by: FER Cota CNP          Sincerely,        FER Cota CNP

## 2024-02-13 ENCOUNTER — DOCUMENTATION ONLY (OUTPATIENT)
Dept: GERIATRICS | Facility: CLINIC | Age: 89
End: 2024-02-13
Payer: COMMERCIAL

## 2024-02-14 NOTE — PROGRESS NOTES
"[facility] GERIATRICS    Chief Complaint   Patient presents with    RECHECK     HPI:  Grace Vega is a 89 year old  (3/15/1929), who is being seen today for an episodic care visit at: THE [facility]) [[id number]].   She and her  moved to this facility 6/2022 to be closer to family and for a higher level of care. Previously lived in an apartment in [place] and received care through [facility].   Medical history significant for DDD lumbar with radiculopathy, gait instability, osteoarthritis, hypothyroidism, bronchiectasis, CKD stage 3, LE edema, macular degeneration, cognitive impairment, left peroneal vein DVT 3/2021.   She was seen in the ED 8/26/2022 for a mechanical fall with head strike. She slipped on the wet floor while doing laundry. CT head showed a right frontal scalp hematoma,mild to moderate diffuse cerebral atrophy and mild small vessel ischemic disease, no acute intracranial pathology. CT cervical spine showed multilevel degenerative changes and no acute fracture or subluxation.   She had COVID-19 6/2023 and was treated with Paxlovid.       Today's concern is:      Skin growth  Primary osteoarthritis of both knees  Chronic pain of both knees  DDD (degenerative disc disease), lumbar  Lumbar radiculopathy  Bronchiectasis without acute exacerbation (H)  Stage 3b chronic kidney disease (H)  Cognitive impairment  She is seen today for multiple concerns. She has what appears to be a small growth or cyst on the bridge of her nose and is worried about the appearance. Reports she has had this for >10 years and it seems to be growing, but feels smaller today. She had some type of procedure for this years ago. No pain of the area, no difficulty breathing through her nose. 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 This was recommended by another resident of the facility, who apparently was \"cured\" of her RA. Explained to " Grace and her  that she has osteoarthritis, which is different from RA. She again asks if she can walk to the dining room on her own, which is not safe. She has difficulty getting around their apartment with her walker and staff escorts her to meals in a wheelchair because of the distance. She's not happy about having staff assistance with showering and asks if this can be stopped. Denies feeling ill. She would like to resume a daily dose of guaifenesin for her chronic cough. No shortness of breath or chest pain.   Spoke with her step son Olayinka Vega, who reports she has been evaluated by Derm multiple times in the past and was told there's nothing to be done about the area on her nose. Family and staff agree that she requires assistance with bathing and wheelchair escorts to meals for safety.     Allergies, and PMH/PSH reviewed in EPIC today    REVIEW OF SYSTEMS:  4 point ROS including Respiratory, CV, GI and , other than that noted in the HPI,  is negative    Objective:   /77   Pulse 79   Temp 98.4  F (36.9  C)   Resp 19   Wt 63.5 kg (140 lb)   BMI 25.61 kg/m    GENERAL APPEARANCE:  Alert, in no distress   ENT:  [place], oropharynx clear  EYES:  conjunctiva and lids normal  RESP:  lungs clear to auscultation bilaterally   CV:  regular rate and rhythm, no murmur, no edema   ABDOMEN:  soft, non-tender, no distension, no masses  M/S: gait unsteady with walker. LOPEZ.  Deformity of both knees, no acute inflammation   SKIN:  small flesh colored bump on the bridge of her nose, no tenderness or erythema. No open areas  PSYCH:  oriented X 2-3, insight and memory impaired , affect and mood normal    Recent labs in [facility] reviewed by me today.     ASSESSMENT / PLAN:  (D49.2) Skin growth  (primary encounter diagnosis)  Comment: long standing   Plan: macario will discuss Derm consult with her daughter    (M17.0) Primary osteoarthritis of both knees  (M25.561,  M25.562,  G89.29) Chronic pain of both  knees  Comment: long standing. She seemed to understand that she does not have RA and a referral to Rheumatology is not indicated. She is due for steroid injections next month-last given 12/28/2023  Plan: continue tylenol, tramadol, diclofenac gel. Refer to Solange Carbajal NP for steroid injections. Walker within the apartment, wheelchair for longer distances     (M51.36) DDD (degenerative disc disease), lumbar  (M54.16) Lumbar radiculopathy  Comment: intermittent back pain   Plan: pain meds as above     (J47.9) Bronchiectasis without acute exacerbation (H)  Comment: chronic cough. No signs of acute illness   Plan: guaifenesin daily and prn     (N18.32) Stage 3b chronic kidney disease (H)  Comment: creatinine 1.46 on 11/9/2023, baseline   Plan: BMP. Avoid nephrotoxins. Renal dose meds     (R41.89) Cognitive impairment  Comment: gradual decline in cognition and functional status. Lacks insight re: her limitations   Plan: AL staff assistance with cares, meals, activities, med admin           Electronically signed by: FER Greene CNP

## 2024-02-15 ENCOUNTER — ASSISTED LIVING VISIT (OUTPATIENT)
Dept: GERIATRICS | Facility: CLINIC | Age: 89
End: 2024-02-15
Payer: COMMERCIAL

## 2024-02-15 VITALS
SYSTOLIC BLOOD PRESSURE: 124 MMHG | WEIGHT: 140 LBS | TEMPERATURE: 98.4 F | DIASTOLIC BLOOD PRESSURE: 77 MMHG | HEART RATE: 79 BPM | BODY MASS INDEX: 25.61 KG/M2 | RESPIRATION RATE: 19 BRPM

## 2024-02-15 DIAGNOSIS — G89.29 CHRONIC PAIN OF BOTH KNEES: ICD-10-CM

## 2024-02-15 DIAGNOSIS — M25.561 CHRONIC PAIN OF BOTH KNEES: ICD-10-CM

## 2024-02-15 DIAGNOSIS — N18.32 STAGE 3B CHRONIC KIDNEY DISEASE (H): ICD-10-CM

## 2024-02-15 DIAGNOSIS — M25.562 CHRONIC PAIN OF BOTH KNEES: ICD-10-CM

## 2024-02-15 DIAGNOSIS — D49.2 SKIN GROWTH: Primary | ICD-10-CM

## 2024-02-15 DIAGNOSIS — R41.89 COGNITIVE IMPAIRMENT: ICD-10-CM

## 2024-02-15 DIAGNOSIS — M17.0 PRIMARY OSTEOARTHRITIS OF BOTH KNEES: ICD-10-CM

## 2024-02-15 DIAGNOSIS — M54.16 LUMBAR RADICULOPATHY: ICD-10-CM

## 2024-02-15 DIAGNOSIS — M51.369 DDD (DEGENERATIVE DISC DISEASE), LUMBAR: ICD-10-CM

## 2024-02-15 DIAGNOSIS — J47.9 BRONCHIECTASIS WITHOUT ACUTE EXACERBATION (H): ICD-10-CM

## 2024-02-15 PROCEDURE — 99350 HOME/RES VST EST HIGH MDM 60: CPT | Performed by: NURSE PRACTITIONER

## 2024-02-15 RX ORDER — GUAIFENESIN 200 MG/10ML
200 LIQUID ORAL EVERY 4 HOURS PRN
Qty: 473 ML | Refills: 97 | Status: SHIPPED | OUTPATIENT
Start: 2024-02-15 | End: 2024-06-28

## 2024-02-15 NOTE — LETTER
2/15/2024        RE: Grace Vega  [id number] [place] [address] GERIATRICS    Chief Complaint   Patient presents with    RECHECK     HPI:  Grace Vega is a 89 year old  (3/15/1929), who is being seen today for an episodic care visit at: THE [facility] ([facility]) [[id number]]. Today's concern is: ***    Allergies, and PMH/PSH reviewed in [facility] today.  REVIEW OF SYSTEMS:  {ygmsrm47:884128}    Objective:   There were no vitals taken for this visit.  {senior living physical exam :529051}    {fgslab:444934}    Assessment/Plan:  {FGS DX2:227351}    MED REC REQUIRED{TIP  Click the link below to document or use med rec list, use list to pull in response :027796}  Post Medication Reconciliation Status: {MED REC LIST:612244}      Orders:  {fgsorders:019254}  ***    Electronically signed by: Cici Osborne ***           Sincerely,        FER Greene CNP

## 2024-03-22 ENCOUNTER — ASSISTED LIVING VISIT (OUTPATIENT)
Dept: GERIATRICS | Facility: CLINIC | Age: 89
End: 2024-03-22
Payer: COMMERCIAL

## 2024-03-22 VITALS
RESPIRATION RATE: 19 BRPM | TEMPERATURE: 98.4 F | SYSTOLIC BLOOD PRESSURE: 124 MMHG | DIASTOLIC BLOOD PRESSURE: 77 MMHG | HEART RATE: 79 BPM | OXYGEN SATURATION: 92 %

## 2024-03-22 DIAGNOSIS — M17.0 PRIMARY OSTEOARTHRITIS OF BOTH KNEES: Primary | ICD-10-CM

## 2024-03-22 DIAGNOSIS — M25.561 CHRONIC PAIN OF BOTH KNEES: ICD-10-CM

## 2024-03-22 DIAGNOSIS — G89.29 CHRONIC PAIN OF BOTH KNEES: ICD-10-CM

## 2024-03-22 DIAGNOSIS — M25.562 CHRONIC PAIN OF BOTH KNEES: ICD-10-CM

## 2024-03-22 PROCEDURE — 20610 DRAIN/INJ JOINT/BURSA W/O US: CPT | Mod: 50 | Performed by: NURSE PRACTITIONER

## 2024-03-22 NOTE — LETTER
"    3/22/2024        RE: Grace Vega  [address] [address]        [facility] GERIATRICS    Chief Complaint   Patient presents with     RECHECK     Bilateral knee injections     HPI:  Grace Vega is a 89 year old  (3/15/1929), who is being seen today for an episodic care visit at: THE [facility] ([facility]) [[id number]].     Today's Visit:  Grace is seen today for repeat bilateral knee injections.  Last had injections on 12/28/23.  She is discouraged by her knee pain, when asked if she feels she still receives pain relief from the injections she states \"Maybe for one day.\"  Writer asked if she feels it is still beneficial for her to get the injections, she is not sure.  We reviewed that cortisone injections can become less effective with end stage arthritis.  Discussed that there are some other types of injections out there that act more as joint lubricant than inflammation relief, but they are more costly and no guarantee they would provide higher level of relief given the advanced nature of her arthritis.  After some thought, Grace decided to proceed with bilateral knee injections again today with the plan to pay close attention and document when she starts to feel pain return so we have a better idea of how long they are providing relief.  We again reviewed why she should avoid walking long distances due to risk of knee instability, falls, etc.  She verbalizes understanding.  Notes that she currently has some PT students working with her, which she is enjoying.  Grace does have back issues which are likely contributing to her knee pain.  Her L leg is the more painful of the two.    Allergies, and PMH/PSH reviewed in [facility] today.  REVIEW OF SYSTEMS:  Focused MSK ROS, pertinent positives/negatives as noted above    Objective:   /77   Pulse 79   Temp 98.4  F (36.9  C)   Resp 19   SpO2 92%   GENERAL APPEARANCE:  Alert, in no distress, " cooperative  M/S:  (bilateral knees)  Skin: intact  Swelling: mild effusion to lateral aspect bilateral knees  Alignment: valgus deformity bilaterally  Tenderness to palpation:  +R lateral joint space   Dorsi/Plantar flexion:  5/5  Calves:  Soft and non-tender  Distal pulses are intact and equal bilaterally  ROM:  flexion to 115, extension lacking ~5-10 degrees.    Pain with extension, no pain with flexion.  PSYCH:  oriented X 3, memory impaired       Assessment/Plan:  (M17.0) Primary osteoarthritis of both knees  (primary encounter diagnosis)  (M25.561,  M25.562,  G89.29) Chronic pain of both knees  See discussion in HPI regarding Grace's perception of pain relief from shots, whether to continue, other injection options, etc.  We will plan to continue with bilateral knee cortisone shots every 3 months as long as Grace is finding pain relief from them.  She will report back on how long this current round of injections provides her with relief at our next visit.  Would like to proceed with bilateral knee injections today.  Plan:   -Bilateral cortisone injections- knees- today  -Continue tramadol, acetaminophen, voltaren gel, and lidocaine patch for pain management  -Plan for next set of bilateral knee injections in 3 months (June)        Procedure:  After risks, benefits and complications of steroid injection were discussed with the patient she elected to proceed.  Using sterile technique, the area was first prepped with betadyne and an alcohol swab.  A 22 gauge needle was used to inject 40 mg DepoMedrol and 4 cc of 1% lidocaine into the knee on the right (via lateral approach) and left (via medial approach).  Grace  tolerated the procedure well without complications.    CPT Code: 45422    Electronically signed by: FER Cota CNP         Sincerely,        FER Cota CNP

## 2024-03-22 NOTE — PROGRESS NOTES
"[facility] GERIATRICS    Chief Complaint   Patient presents with    RECHECK     Bilateral knee injections     HPI:  Grace Vega is a 89 year old  (3/15/1929), who is being seen today for an episodic care visit at: [facility]) [[id number]].     Today's Visit:  Grace is seen today for repeat bilateral knee injections.  Last had injections on 12/28/23.  She is discouraged by her knee pain, when asked if she feels she still receives pain relief from the injections she states \"Maybe for one day.\"  Writer asked if she feels it is still beneficial for her to get the injections, she is not sure.  We reviewed that cortisone injections can become less effective with end stage arthritis.  Discussed that there are some other types of injections out there that act more as joint lubricant than inflammation relief, but they are more costly and no guarantee they would provide higher level of relief given the advanced nature of her arthritis.  After some thought, Grace decided to proceed with bilateral knee injections again today with the plan to pay close attention and document when she starts to feel pain return so we have a better idea of how long they are providing relief.  We again reviewed why she should avoid walking long distances due to risk of knee instability, falls, etc.  She verbalizes understanding.  Notes that she currently has some PT students working with her, which she is enjoying.  Grace does have back issues which are likely contributing to her knee pain.  Her L leg is the more painful of the two.    Allergies, and PMH/PSH reviewed in EPIC today.  REVIEW OF SYSTEMS:  Focused MSK ROS, pertinent positives/negatives as noted above    Objective:   /77   Pulse 79   Temp 98.4  F (36.9  C)   Resp 19   SpO2 92%   GENERAL APPEARANCE:  Alert, in no distress, cooperative  M/S:  (bilateral knees)  Skin: intact  Swelling: mild effusion to lateral aspect bilateral knees  Alignment: " valgus deformity bilaterally  Tenderness to palpation:  +R lateral joint space   Dorsi/Plantar flexion:  5/5  Calves:  Soft and non-tender  Distal pulses are intact and equal bilaterally  ROM:  flexion to 115, extension lacking ~5-10 degrees.    Pain with extension, no pain with flexion.  PSYCH:  oriented X 3, memory impaired       Assessment/Plan:  (M17.0) Primary osteoarthritis of both knees  (primary encounter diagnosis)  (M25.561,  M25.562,  G89.29) Chronic pain of both knees  See discussion in HPI regarding Grace's perception of pain relief from shots, whether to continue, other injection options, etc.  We will plan to continue with bilateral knee cortisone shots every 3 months as long as Grace is finding pain relief from them.  She will report back on how long this current round of injections provides her with relief at our next visit.  Would like to proceed with bilateral knee injections today.  Plan:   -Bilateral cortisone injections- knees- today  -Continue tramadol, acetaminophen, voltaren gel, and lidocaine patch for pain management  -Plan for next set of bilateral knee injections in 3 months (June)        Procedure:  After risks, benefits and complications of steroid injection were discussed with the patient she elected to proceed.  Using sterile technique, the area was first prepped with betadyne and an alcohol swab.  A 22 gauge needle was used to inject 40 mg DepoMedrol and 4 cc of 1% lidocaine into the knee on the right (via lateral approach) and left (via medial approach).  Grace  tolerated the procedure well without complications.    CPT Code: 53096    Electronically signed by: FER Cota CNP

## 2024-04-23 ENCOUNTER — ASSISTED LIVING VISIT (OUTPATIENT)
Dept: GERIATRICS | Facility: CLINIC | Age: 89
End: 2024-04-23
Payer: COMMERCIAL

## 2024-04-23 DIAGNOSIS — R41.89 COGNITIVE IMPAIRMENT: ICD-10-CM

## 2024-04-23 DIAGNOSIS — M17.0 PRIMARY OSTEOARTHRITIS OF BOTH KNEES: Primary | ICD-10-CM

## 2024-04-23 DIAGNOSIS — G89.29 CHRONIC PAIN OF BOTH KNEES: ICD-10-CM

## 2024-04-23 DIAGNOSIS — J47.9 BRONCHIECTASIS WITHOUT ACUTE EXACERBATION (H): ICD-10-CM

## 2024-04-23 DIAGNOSIS — M25.561 CHRONIC PAIN OF BOTH KNEES: ICD-10-CM

## 2024-04-23 DIAGNOSIS — M25.562 CHRONIC PAIN OF BOTH KNEES: ICD-10-CM

## 2024-04-23 DIAGNOSIS — N18.32 STAGE 3B CHRONIC KIDNEY DISEASE (H): ICD-10-CM

## 2024-04-23 PROCEDURE — 99349 HOME/RES VST EST MOD MDM 40: CPT | Performed by: NURSE PRACTITIONER

## 2024-04-23 NOTE — LETTER
4/23/2024        RE: Grace Vega  [facility]  [address] [address]        [facility] GERIATRICS    Chief Complaint   Patient presents with     RECHECK     HPI:  Grace Vega is a 89 year old  (3/15/1929), who is being seen today for an episodic care visit at: THE [facility] ([facility]) [[id number]].   She and her  moved to this facility 6/2022 to be closer to family and for a higher level of care. Previously lived in an apartment in [place] and received care through [facility].   Medical history significant for DDD lumbar with radiculopathy, gait instability, osteoarthritis, hypothyroidism, bronchiectasis, CKD stage 3, LE edema, macular degeneration, cognitive impairment, left peroneal vein DVT 3/2021.   She was seen in the ED 8/26/2022 for a mechanical fall with head strike. She slipped on the wet floor while doing laundry. CT head showed a right frontal scalp hematoma,mild to moderate diffuse cerebral atrophy and mild small vessel ischemic disease, no acute intracranial pathology. CT cervical spine showed multilevel degenerative changes and no acute fracture or subluxation.   She had COVID-19 6/2023 and was treated with Paxlovid.       Today's concern is:      Primary osteoarthritis of both knees  Chronic pain of both knees  Cognitive impairment  Bronchiectasis without acute exacerbation (H)  Stage 3b chronic kidney disease (H)  She is a fair historian. Reports chronic pain of both knees, worse on the left and is hoping it's time for a steroid injection. Last injection was 12/28/2023 with good results. She again talks about wanting to ambulate to the dining room, which isn't feasible. She is otherwise feeling well. Good appetite. Reports her chronic cough is unchanged. Conversation is more repetitive today. Ambulates with a walker within the apartment and staff takes her to meals in a wheelchair. She resists help with cares and  her clothes are dirty today. The apartment also has an odor, which is new.   Spoke with her stepson Olayinka Vega, who has noticed that her cognition is slipping. Personal hygiene is also slipping, although patient and her daughter feel she's managing well.     Allergies, and PMH/PSH reviewed in EPIC today    REVIEW OF SYSTEMS:  4 point ROS including Respiratory, CV, GI and , other than that noted in the HPI,  is negative    Objective:   /62   Pulse 80   Resp 20   Wt 63.5 kg (140 lb)   SpO2 93%   BMI 25.61 kg/m    GENERAL APPEARANCE:  Alert, in no distress   ENT:  [place], oropharynx clear  EYES:  conjunctiva and lids normal  RESP:  lungs clear to auscultation bilaterally   CV:  regular rate and rhythm, no murmur, no edema   ABDOMEN:  soft, non-tender, no distension   M/S: gait unsteady with walker. LOPEZ.  Deformity of both knees, no acute inflammation   SKIN:  no rashes or open areas   PSYCH:  oriented X 2-3, insight and memory impaired, affect and mood normal    Recent labs in [facility] reviewed by me today.     ASSESSMENT / PLAN:  (M17.0) Primary osteoarthritis of both knees  (primary encounter diagnosis)  (M25.561,  M25.562,  G89.29) Chronic pain of both knees  Comment: long standing and due for a steroid injection   Plan: continue tylenol, tramadol, diclofenac gel. Solange Carbajal NP is planning to see later this week for steroid injection. Recommend walker within the apartment, wheelchair for longer distances     (R41.89) Cognitive impairment  Comment: gradual decline in cognition and functional status. Both she and her  require more assistance with hygiene, which she has resisted   Plan: family will follow up with AL staff re: services. AL staff assistance with cares, meals, activities, med admin   Discussed with staff     (J47.9) Bronchiectasis without acute exacerbation (H)  Comment: no acute issues   Plan: continue mucinex, guaifenesin     (N18.32) Stage 3b chronic kidney disease (H)  Comment:  creatinine 1.46 on 11/9/2023, baseline   Plan: labs at next visit. Avoid nephrotoxins, renal dose meds               Electronically signed by: FER Greene CNP           Sincerely,        FER Greene CNP

## 2024-04-24 ENCOUNTER — DOCUMENTATION ONLY (OUTPATIENT)
Dept: OTHER | Facility: CLINIC | Age: 89
End: 2024-04-24
Payer: COMMERCIAL

## 2024-05-08 VITALS
RESPIRATION RATE: 20 BRPM | WEIGHT: 140 LBS | HEART RATE: 80 BPM | BODY MASS INDEX: 25.61 KG/M2 | DIASTOLIC BLOOD PRESSURE: 62 MMHG | OXYGEN SATURATION: 93 % | SYSTOLIC BLOOD PRESSURE: 122 MMHG

## 2024-05-08 NOTE — PROGRESS NOTES
[facility] GERIATRICS    Chief Complaint   Patient presents with    RECHECK     HPI:  Grace Vega is a 89 year old  (3/15/1929), who is being seen today for an episodic care visit at: THE [facility]) [[id number]].   She and her  moved to this facility 6/2022 to be closer to family and for a higher level of care. Previously lived in an apartment in [place] and received care through [facility].   Medical history significant for DDD lumbar with radiculopathy, gait instability, osteoarthritis, hypothyroidism, bronchiectasis, CKD stage 3, LE edema, macular degeneration, cognitive impairment, left peroneal vein DVT 3/2021.   She was seen in the ED 8/26/2022 for a mechanical fall with head strike. She slipped on the wet floor while doing laundry. CT head showed a right frontal scalp hematoma,mild to moderate diffuse cerebral atrophy and mild small vessel ischemic disease, no acute intracranial pathology. CT cervical spine showed multilevel degenerative changes and no acute fracture or subluxation.   She had COVID-19 6/2023 and was treated with Paxlovid.       Today's concern is:      Primary osteoarthritis of both knees  Chronic pain of both knees  Cognitive impairment  Bronchiectasis without acute exacerbation (H)  Stage 3b chronic kidney disease (H)  She is a fair historian. Reports chronic pain of both knees, worse on the left and is hoping it's time for a steroid injection. Last injection was 12/28/2023 with good results. She again talks about wanting to ambulate to the dining room, which isn't feasible. She is otherwise feeling well. Good appetite. Reports her chronic cough is unchanged. Conversation is more repetitive today. Ambulates with a walker within the apartment and staff takes her to meals in a wheelchair. She resists help with cares and her clothes are dirty today. The apartment also has an odor, which is new.   Spoke with her stepson Olayinka Vega, who  has noticed that her cognition is slipping. Personal hygiene is also slipping, although patient and her daughter feel she's managing well.     Allergies, and PMH/PSH reviewed in EPIC today    REVIEW OF SYSTEMS:  4 point ROS including Respiratory, CV, GI and , other than that noted in the HPI,  is negative    Objective:   /62   Pulse 80   Resp 20   Wt 63.5 kg (140 lb)   SpO2 93%   BMI 25.61 kg/m    GENERAL APPEARANCE:  Alert, in no distress   ENT:  [place], oropharynx clear  EYES:  conjunctiva and lids normal  RESP:  lungs clear to auscultation bilaterally   CV:  regular rate and rhythm, no murmur, no edema   ABDOMEN:  soft, non-tender, no distension   M/S: gait unsteady with walker. LOPEZ.  Deformity of both knees, no acute inflammation   SKIN:  no rashes or open areas   PSYCH:  oriented X 2-3, insight and memory impaired, affect and mood normal    Recent labs in [facility] reviewed by me today.     ASSESSMENT / PLAN:  (M17.0) Primary osteoarthritis of both knees  (primary encounter diagnosis)  (M25.561,  M25.562,  G89.29) Chronic pain of both knees  Comment: long standing and due for a steroid injection   Plan: continue tylenol, tramadol, diclofenac gel. Solange Carbajal NP is planning to see later this week for steroid injection. Recommend walker within the apartment, wheelchair for longer distances     (R41.89) Cognitive impairment  Comment: gradual decline in cognition and functional status. Both she and her  require more assistance with hygiene, which she has resisted   Plan: family will follow up with AL staff re: services. AL staff assistance with cares, meals, activities, med admin   Discussed with staff     (J47.9) Bronchiectasis without acute exacerbation (H)  Comment: no acute issues   Plan: continue mucinex, guaifenesin     (N18.32) Stage 3b chronic kidney disease (H)  Comment: creatinine 1.46 on 11/9/2023, baseline   Plan: labs at next visit. Avoid nephrotoxins, renal dose meds                Electronically signed by: FER Greene CNP

## 2024-05-19 DIAGNOSIS — E03.9 ACQUIRED HYPOTHYROIDISM: ICD-10-CM

## 2024-05-20 RX ORDER — LEVOTHYROXINE SODIUM 88 UG/1
TABLET ORAL
Qty: 90 TABLET | Refills: 97 | Status: SHIPPED | OUTPATIENT
Start: 2024-05-20

## 2024-06-27 ENCOUNTER — ASSISTED LIVING VISIT (OUTPATIENT)
Dept: GERIATRICS | Facility: CLINIC | Age: 89
End: 2024-06-27
Payer: COMMERCIAL

## 2024-06-27 VITALS
WEIGHT: 140 LBS | HEART RATE: 83 BPM | RESPIRATION RATE: 15 BRPM | DIASTOLIC BLOOD PRESSURE: 78 MMHG | SYSTOLIC BLOOD PRESSURE: 135 MMHG | BODY MASS INDEX: 25.61 KG/M2 | TEMPERATURE: 98.1 F

## 2024-06-27 DIAGNOSIS — R41.89 COGNITIVE IMPAIRMENT: ICD-10-CM

## 2024-06-27 DIAGNOSIS — M25.561 CHRONIC PAIN OF BOTH KNEES: ICD-10-CM

## 2024-06-27 DIAGNOSIS — M51.369 DDD (DEGENERATIVE DISC DISEASE), LUMBAR: ICD-10-CM

## 2024-06-27 DIAGNOSIS — G89.29 CHRONIC PAIN OF BOTH KNEES: ICD-10-CM

## 2024-06-27 DIAGNOSIS — J47.9 BRONCHIECTASIS WITHOUT ACUTE EXACERBATION (H): ICD-10-CM

## 2024-06-27 DIAGNOSIS — N18.32 STAGE 3B CHRONIC KIDNEY DISEASE (H): ICD-10-CM

## 2024-06-27 DIAGNOSIS — M17.0 PRIMARY OSTEOARTHRITIS OF BOTH KNEES: Primary | ICD-10-CM

## 2024-06-27 DIAGNOSIS — M25.562 CHRONIC PAIN OF BOTH KNEES: ICD-10-CM

## 2024-06-27 PROCEDURE — 99349 HOME/RES VST EST MOD MDM 40: CPT | Performed by: NURSE PRACTITIONER

## 2024-06-27 NOTE — PROGRESS NOTES
[facility] GERIATRICS    Chief Complaint   Patient presents with    RECHECK     HPI:  Grace Vega is a 89 year old  (3/15/1929), who is being seen today for an episodic care visit at: THE [facility]) [[id number]].   She and her  moved to this facility 6/2022 to be closer to family and for a higher level of care. Previously lived in an apartment in [place] and received care through [facility].   Medical history significant for DDD lumbar with radiculopathy, gait instability, osteoarthritis, hypothyroidism, bronchiectasis, CKD stage 3, LE edema, macular degeneration, cognitive impairment, left peroneal vein DVT 3/2021.   She was seen in the ED 8/26/2022 for a mechanical fall with head strike. She slipped on the wet floor while doing laundry. CT head showed a right frontal scalp hematoma,mild to moderate diffuse cerebral atrophy and mild small vessel ischemic disease, no acute intracranial pathology. CT cervical spine showed multilevel degenerative changes and no acute fracture or subluxation.   She had COVID-19 6/2023 and was treated with Paxlovid.       Today's concern is:      Primary osteoarthritis of both knees  Chronic pain of both knees  DDD (degenerative disc disease), lumbar  Bronchiectasis without acute exacerbation (H)  Stage 3b chronic kidney disease (H)  Cognitive impairment  She is a fair historian. Reports increased knee pain for the past week, worse on the left knee. No falls or injury. Continues to use her walker in the apartment and wheelchair for longer distances. Reports her chronic cough has been worse the past 1-2 days and she feels tired today. Denies feeling ill. No shortness of breath or chest pain. Denies fever or chills. Good appetite. Staff reports she has been more accepting of help with showers and hygiene.       Allergies, and PMH/PSH reviewed in EPIC today    REVIEW OF SYSTEMS:  4 point ROS including Respiratory, CV, GI and ,  other than that noted in the HPI,  is negative    Objective:   /78   Pulse 83   Temp 98.1  F (36.7  C)   Resp 15   Wt 63.5 kg (140 lb)   BMI 25.61 kg/m    GENERAL APPEARANCE:  Alert, in no distress   ENT:  [place], oropharynx clear  EYES:  conjunctiva and lids normal  RESP:  expiratory wheezes bilaterally, frequent harsh cough   CV:  regular rate and rhythm, no murmur, no edema   ABDOMEN:  soft, non-tender, no distension   M/S: up in chair. LOPEZ.  Deformity of both knees, no warmth or acute inflammation   SKIN:  no rashes or open areas   PSYCH:  oriented X 2-3, insight and memory impaired, affect and mood normal    Recent labs in [facility] reviewed by me today.     ASSESSMENT / PLAN:  (M17.0) Primary osteoarthritis of both knees  (primary encounter diagnosis)  (M25.561,  M25.562,  G89.29) Chronic pain of both knees  (M51.36) DDD (degenerative disc disease), lumbar  Comment: chronic pain. Last steroid injection was 3/22/2024  Plan: refer to Solange Carbajal NP for steroid injections. Continue tylenol, diclofenac gel, tramadol. Walker and wheelchair for mobility  Message left for  macario Vega     (J47.9) Bronchiectasis without acute exacerbation (H)  Comment: increased cough and fatigue. COVID-19 negative today  Plan: CXR, Continue guaifenesin liquid, and Mucinex. Monitor symptoms     (N18.32) Stage 3b chronic kidney disease (H)  Comment: creatinine 1.46 on 11/9/2023, baseline   Plan: follow BMP. Avoid nephrotoxins, renal dose meds.     (R41.89) Cognitive impairment  Comment: declining cognition and functional status  Plan: AL staff assistance with cares, meals, activities, med admin      Electronically signed by: FER Greene CNP

## 2024-06-27 NOTE — LETTER
6/27/2024      Grace Nance [address] [address]        No notes on file      Sincerely,        FER Greene CNP

## 2024-06-28 ENCOUNTER — TELEPHONE (OUTPATIENT)
Dept: GERIATRICS | Facility: CLINIC | Age: 89
End: 2024-06-28
Payer: COMMERCIAL

## 2024-06-28 DIAGNOSIS — J18.9 PNEUMONIA OF LEFT LOWER LOBE DUE TO INFECTIOUS ORGANISM: Primary | ICD-10-CM

## 2024-06-28 RX ORDER — AMOXICILLIN AND CLAVULANATE POTASSIUM 500; 125 MG/1; MG/1
1 TABLET, FILM COATED ORAL 2 TIMES DAILY
Qty: 14 TABLET | Refills: 0 | Status: SHIPPED | OUTPATIENT
Start: 2024-06-28 | End: 2024-07-05

## 2024-06-28 RX ORDER — GUAIFENESIN 200 MG/10ML
200 LIQUID ORAL 2 TIMES DAILY
Qty: 473 ML | Refills: 11 | Status: SHIPPED | OUTPATIENT
Start: 2024-06-28

## 2024-06-28 NOTE — TELEPHONE ENCOUNTER
Grace Vega    3/15/1929    CXR done 2024 shows left base infiltrate    Estimated Creatinine Clearance: 20.2 mL/min (A) (based on SCr of 1.46 mg/dL (H)).      Orders:  Augmentin 500-125 mg po bid X 7 days for pneumonia  Increase guaifenesin 20 mg/ml to 10 ml po bid and 10 ml po every 4 hrs prn cough  Next lab day: CBC, BMP  (pneumonia, CKD)  Sent to pharmacy  Will call family      FER Greene CNP on 2024 at 7:39 AM

## 2024-06-30 ENCOUNTER — LAB REQUISITION (OUTPATIENT)
Dept: LAB | Facility: CLINIC | Age: 89
End: 2024-06-30
Payer: COMMERCIAL

## 2024-06-30 DIAGNOSIS — N17.0 ACUTE KIDNEY FAILURE WITH TUBULAR NECROSIS (H): ICD-10-CM

## 2024-06-30 DIAGNOSIS — J18.9 PNEUMONIA, UNSPECIFIED ORGANISM: ICD-10-CM

## 2024-07-03 LAB
ANION GAP SERPL CALCULATED.3IONS-SCNC: 10 MMOL/L (ref 7–15)
BUN SERPL-MCNC: 22.8 MG/DL (ref 8–23)
CALCIUM SERPL-MCNC: 8.9 MG/DL (ref 8.2–9.6)
CHLORIDE SERPL-SCNC: 109 MMOL/L (ref 98–107)
CREAT SERPL-MCNC: 1.51 MG/DL (ref 0.51–0.95)
DEPRECATED HCO3 PLAS-SCNC: 22 MMOL/L (ref 22–29)
EGFRCR SERPLBLD CKD-EPI 2021: 31 ML/MIN/1.73M2
ERYTHROCYTE [DISTWIDTH] IN BLOOD BY AUTOMATED COUNT: 14 % (ref 10–15)
GLUCOSE SERPL-MCNC: 123 MG/DL (ref 70–99)
HCT VFR BLD AUTO: 37.9 % (ref 35–47)
HGB BLD-MCNC: 12.3 G/DL (ref 11.7–15.7)
MCH RBC QN AUTO: 33.3 PG (ref 26.5–33)
MCHC RBC AUTO-ENTMCNC: 32.5 G/DL (ref 31.5–36.5)
MCV RBC AUTO: 103 FL (ref 78–100)
PLATELET # BLD AUTO: 279 10E3/UL (ref 150–450)
POTASSIUM SERPL-SCNC: 4.3 MMOL/L (ref 3.4–5.3)
RBC # BLD AUTO: 3.69 10E6/UL (ref 3.8–5.2)
SODIUM SERPL-SCNC: 141 MMOL/L (ref 135–145)
WBC # BLD AUTO: 9.2 10E3/UL (ref 4–11)

## 2024-07-03 PROCEDURE — 85027 COMPLETE CBC AUTOMATED: CPT | Mod: ORL | Performed by: NURSE PRACTITIONER

## 2024-07-03 PROCEDURE — P9603 ONE-WAY ALLOW PRORATED MILES: HCPCS | Mod: ORL | Performed by: NURSE PRACTITIONER

## 2024-07-03 PROCEDURE — 36415 COLL VENOUS BLD VENIPUNCTURE: CPT | Mod: ORL | Performed by: NURSE PRACTITIONER

## 2024-07-03 PROCEDURE — 80048 BASIC METABOLIC PNL TOTAL CA: CPT | Mod: ORL | Performed by: NURSE PRACTITIONER

## 2024-07-09 ENCOUNTER — ASSISTED LIVING VISIT (OUTPATIENT)
Dept: GERIATRICS | Facility: CLINIC | Age: 89
End: 2024-07-09
Payer: COMMERCIAL

## 2024-07-09 ENCOUNTER — ASSISTED LIVING VISIT (OUTPATIENT)
Dept: GERIATRICS | Facility: CLINIC | Age: 89
End: 2024-07-09

## 2024-07-09 VITALS
TEMPERATURE: 98.1 F | HEART RATE: 83 BPM | SYSTOLIC BLOOD PRESSURE: 135 MMHG | DIASTOLIC BLOOD PRESSURE: 78 MMHG | RESPIRATION RATE: 15 BRPM | BODY MASS INDEX: 25.61 KG/M2 | WEIGHT: 140 LBS

## 2024-07-09 DIAGNOSIS — N18.32 STAGE 3B CHRONIC KIDNEY DISEASE (H): ICD-10-CM

## 2024-07-09 DIAGNOSIS — M17.0 PRIMARY OSTEOARTHRITIS OF BOTH KNEES: Primary | ICD-10-CM

## 2024-07-09 DIAGNOSIS — J18.9 PNEUMONIA OF LEFT LOWER LOBE DUE TO INFECTIOUS ORGANISM: Primary | ICD-10-CM

## 2024-07-09 DIAGNOSIS — M17.0 PRIMARY OSTEOARTHRITIS OF BOTH KNEES: ICD-10-CM

## 2024-07-09 DIAGNOSIS — G89.29 CHRONIC PAIN OF BOTH KNEES: ICD-10-CM

## 2024-07-09 DIAGNOSIS — R41.89 COGNITIVE IMPAIRMENT: ICD-10-CM

## 2024-07-09 DIAGNOSIS — J47.9 BRONCHIECTASIS WITHOUT ACUTE EXACERBATION (H): ICD-10-CM

## 2024-07-09 DIAGNOSIS — M25.562 CHRONIC PAIN OF BOTH KNEES: ICD-10-CM

## 2024-07-09 DIAGNOSIS — M25.561 CHRONIC PAIN OF BOTH KNEES: ICD-10-CM

## 2024-07-09 PROCEDURE — 99349 HOME/RES VST EST MOD MDM 40: CPT | Performed by: NURSE PRACTITIONER

## 2024-07-09 PROCEDURE — 99207 PR NO CHARGE LOS: CPT | Performed by: PHYSICIAN ASSISTANT

## 2024-07-09 NOTE — PROGRESS NOTES
Ortho Nursing home visit    Grace Vega is a 89 year old female who resides at [facility]    Patient is seen today for Chronic knee pain, 2nd to OA< has had steroid injections tin the past with some relief from symptoms,, Patient is seen today with NP in unit. Grace has been recovering from Resp infection, on antibiotics;      Past Medical History:   Diagnosis Date    Acquired hypothyroidism     Bilateral leg edema     Bronchiectasis (H)     DDD (degenerative disc disease), lumbar     Deep venous thrombosis (DVT) of left peroneal vein (H) 09/2021    History of basal cell carcinoma     Lumbar radiculopathy     Macular degeneration (senile) of retina     Osteopenia     Pseudophakia     right eye    Sensorineural hearing loss, bilateral     Tinnitus, bilateral     Vertigo       Past Surgical History:   Procedure Laterality Date    FOOT SURGERY      TONSILLECTOMY      WRIST GANGLION EXCISION Left 1949        Allergies   Allergen Reactions    Sulfa Antibiotics Unknown      There were no vitals taken for this visit.     Exam: Able to transfer from bed, to dining area; seated; allows PROM to both knees, 90/0 Lig intact, no noted effusion or swelling, endorses knee pain with walking, using walker full time.      X-rays show advanced OA both knees,     ASSESSMENT / PLAN:  After verbal consent, both knees, prepped, injected medial joint space, Both knees, with 1 % lidocaine, 5 ml and 1 ml depo medrol,     Tolerated well, no complaints,    [id number]          [place] OPA-C  [phone] Cell

## 2024-07-09 NOTE — LETTER
7/9/2024      Grace Nance [address] [address]        No notes on file      Sincerely,        FER Greene CNP

## 2024-07-09 NOTE — PROGRESS NOTES
[facility]    Chief Complaint   Patient presents with    RECHECK     HPI:  Grace Vega is a 89 year old  (3/15/1929), who is being seen today for an episodic care visit at: THE [facility]) [[id number]].   She and her  moved to this facility 6/2022 to be closer to family and for a higher level of care. Previously lived in an apartment in [place] and received care through [facility].   Medical history significant for DDD lumbar with radiculopathy, gait instability, osteoarthritis, hypothyroidism, bronchiectasis, CKD stage 3, LE edema, macular degeneration, cognitive impairment, left peroneal vein DVT 3/2021.   She was seen in the ED 8/26/2022 for a mechanical fall with head strike. She slipped on the wet floor while doing laundry. CT head showed a right frontal scalp hematoma,mild to moderate diffuse cerebral atrophy and mild small vessel ischemic disease, no acute intracranial pathology. CT cervical spine showed multilevel degenerative changes and no acute fracture or subluxation.   She had COVID-19 6/2023 and was treated with Paxlovid.       Today's concern is:      Pneumonia of left lower lobe due to infectious organism  Bronchiectasis without acute exacerbation (H)  Primary osteoarthritis of both knees  Chronic pain of both knees  Stage 3b chronic kidney disease (H)  Cognitive impairment  She is seen today to follow up on left base pneumonia per CXR 6/27/2024. Completed a course of Augmentin 7/5/2024. Huber Miltonstephanie TAVERAS is here to inject her knees. She's in bed and somewhat disheveled, which is unusual for her. Denies feeling ill. Reports cough has improved and is back to usual. No chest pain or shortness of breath. Was able to get up with her walker and move to a chair in the dining area. Staff reports no new issues.       Allergies, and PMH/PSH reviewed in EPIC today    REVIEW OF SYSTEMS:  4 point ROS including Respiratory, CV, GI and , other  than that noted in the HPI,  is negative    Objective:   /78   Pulse 83   Temp 98.1  F (36.7  C)   Resp 15   Wt 63.5 kg (140 lb)   BMI 25.61 kg/m    GENERAL APPEARANCE:  Alert, in no distress   ENT:  [place], oropharynx clear  EYES:  conjunctiva and lids normal  RESP:  lungs clear bilaterally, no wheezes   CV:  regular rate and rhythm, no murmur, no edema   ABDOMEN:  soft, non-tender, no distension   M/S: gait slow with walker. LOPEZ.  Deformity of both knees   SKIN:  no rashes or open areas   PSYCH:  oriented X 2-3, insight and memory impaired, affect and mood normal    Recent labs in [facility] reviewed by me today.       ASSESSMENT / PLAN:  (J18.9) Pneumonia of left lower lobe due to infectious organism  (primary encounter diagnosis)  (J47.9) Bronchiectasis without acute exacerbation (H)  Comment: completed Augmentin 7/5/2024, respiratory status is back to baseline   Plan: continue guaifenesin liquid and Mucinex, albuterol inhaler prn. Monitor symptoms     (M17.0) Primary osteoarthritis of both knees  (M25.561,  M25.562,  G89.29) Chronic pain of both knees  Comment: chronic   Plan: both knees injected by Huber TAVERAS, please refer to his note. Continue tylenol, tramadol, diclofenac gel     (N18.32) Stage 3b chronic kidney disease (H)  Comment: creatinine 1.51 on 7/3/2024, baseline   Plan: follow BMP. Avoid nephrotoxins, renal dose meds     (R41.89) Cognitive impairment  Comment: gradual decline in cognition and functional status over the past several months   Plan: AL staff assistance with cares, meals, activities, med admin        Electronically signed by: FER Greene CNP

## 2024-07-28 ENCOUNTER — HEALTH MAINTENANCE LETTER (OUTPATIENT)
Age: 89
End: 2024-07-28

## 2024-08-28 ENCOUNTER — ASSISTED LIVING VISIT (OUTPATIENT)
Dept: GERIATRICS | Facility: CLINIC | Age: 89
End: 2024-08-28
Payer: COMMERCIAL

## 2024-08-28 VITALS
BODY MASS INDEX: 24.69 KG/M2 | SYSTOLIC BLOOD PRESSURE: 149 MMHG | WEIGHT: 135 LBS | TEMPERATURE: 97.3 F | HEART RATE: 87 BPM | RESPIRATION RATE: 20 BRPM | DIASTOLIC BLOOD PRESSURE: 68 MMHG

## 2024-08-28 DIAGNOSIS — B37.2 YEAST INFECTION OF THE SKIN: Primary | ICD-10-CM

## 2024-08-28 DIAGNOSIS — M17.0 PRIMARY OSTEOARTHRITIS OF BOTH KNEES: ICD-10-CM

## 2024-08-28 DIAGNOSIS — M25.562 CHRONIC PAIN OF BOTH KNEES: ICD-10-CM

## 2024-08-28 DIAGNOSIS — J47.9 BRONCHIECTASIS WITHOUT ACUTE EXACERBATION (H): ICD-10-CM

## 2024-08-28 DIAGNOSIS — R41.89 COGNITIVE IMPAIRMENT: ICD-10-CM

## 2024-08-28 DIAGNOSIS — M51.369 DDD (DEGENERATIVE DISC DISEASE), LUMBAR: ICD-10-CM

## 2024-08-28 DIAGNOSIS — M25.561 CHRONIC PAIN OF BOTH KNEES: ICD-10-CM

## 2024-08-28 DIAGNOSIS — N18.32 STAGE 3B CHRONIC KIDNEY DISEASE (H): ICD-10-CM

## 2024-08-28 DIAGNOSIS — G89.29 CHRONIC PAIN OF BOTH KNEES: ICD-10-CM

## 2024-08-28 DIAGNOSIS — M54.16 LUMBAR RADICULOPATHY: ICD-10-CM

## 2024-08-28 PROCEDURE — 99349 HOME/RES VST EST MOD MDM 40: CPT | Performed by: NURSE PRACTITIONER

## 2024-08-28 NOTE — LETTER
8/28/2024      Grace Vega  [address] [address]          [facility]    Chief Complaint   Patient presents with     RECHECK     HPI:  Grace Vega is a 89 year old  (3/15/1929), who is being seen today for an episodic care visit at: THE [facility]) [[id number]].   She and her  moved to this facility 6/2022 to be closer to family and for a higher level of care. Previously lived in an apartment in [place] and received care through [facility].   Medical history significant for DDD lumbar with radiculopathy, gait instability, osteoarthritis, hypothyroidism, bronchiectasis, CKD stage 3, LE edema, macular degeneration, cognitive impairment, left peroneal vein DVT 3/2021.   She was seen in the ED 8/26/2022 for a mechanical fall with head strike. She slipped on the wet floor while doing laundry. CT head showed a right frontal scalp hematoma,mild to moderate diffuse cerebral atrophy and mild small vessel ischemic disease, no acute intracranial pathology. CT cervical spine showed multilevel degenerative changes and no acute fracture or subluxation.   She had COVID-19 6/2023 and was treated with Paxlovid.   She had left base pneumonia on CXR 6/27/2024, completed Augmentin 7/5/2024.       Today's concern is:      Yeast infection of the skin  Primary osteoarthritis of both knees  Chronic pain of both knees  DDD (degenerative disc disease), lumbar  Lumbar radiculopathy  Bronchiectasis without acute exacerbation (H)  Stage 3b chronic kidney disease (H)  Cognitive impairment  She is seen today to follow up on multiple medical issues. Her  has been hospitalized and returned home yesterday. Her stepson Olayinka Vega is present and assists with history. Patient reports the rash under her left breast is improving with nystatin cream. She feels the rash is related to when she had shingles years ago. She had steroid  injections to her knees 7/9/2024 with relief. She looks tired today, but states she did well on her own when her  was inpatient. Privately, macario reports she was anxious and teary, frequently calling family. Olayinka also reports issues with hygiene and incontinence. She had an episode of diarrhea with fecal incontinence last night. Family has found stool on her clothes and furniture. She denies having diarrhea or any GI issues. She reported nausea from guaifenesin to staff, but today denies having nausea.   Ambulates within the apartment with her walker, uses a wheelchair for longer distances. Receives assistance with bathing and dressing.     Allergies, and PMH/PSH reviewed in EPIC today    REVIEW OF SYSTEMS:  4 point ROS including Respiratory, CV, GI and , other than that noted in the HPI,  is negative    Objective:   BP (!) 149/68   Pulse 87   Temp 97.3  F (36.3  C)   Resp 20   Wt 61.2 kg (135 lb)   BMI 24.69 kg/m    GENERAL APPEARANCE:  Alert, in no distress   ENT:  [place], oropharynx clear  EYES:  conjunctiva and lids normal  RESP:  lungs clear bilaterally, no crackles or wheezes   CV:  regular rate and rhythm, no murmur, no edema   ABDOMEN:  soft, non-tender, no distension   M/S: up in chair. LOPEZ with good strength. Deformity of both knees, no acute inflammation   SKIN:  faint pink rash under left breast, no open areas   PSYCH:  oriented X 2-3, insight and memory impaired, affect and mood normal    Recent labs in [facility] reviewed by me today.       ASSESSMENT / PLAN:  (B37.2) Yeast infection of the skin  (primary encounter diagnosis)  Comment: improving   Plan: continue nystatin, keep clean and dry     (M17.0) Primary osteoarthritis of both knees  (M25.561,  M25.562,  G89.29) Chronic pain of both knees  (M51.36) DDD (degenerative disc disease), lumbar  (M54.16) Lumbar radiculopathy  Comment: chronic pain fairly well managed   Plan: continue tramadol, tylenol. Repeat steroid injections prn. Recommend  wheelchair for distances outside of her apartment     (J47.9) Bronchiectasis without acute exacerbation (H)  Comment: chronic cough unchanged   Plan: continue guaifenesin, mucinex, albuterol prn     (N18.32) Stage 3b chronic kidney disease (H)  Comment: creatinine 1.51 on 7/3/2024, baseline   Plan: BMP. Avoid nephrotoxins, renal dose meds     (R41.89) Cognitive impairment  Comment: gradual decline in cognition and functional status. She lacks insight re: her physical limitations   Plan: AL staff assistance with cares, meals, activities, med admin. Family has increased AL services.         Electronically signed by: FER Greene CNP                     Sincerely,        FER Greene CNP

## 2024-08-28 NOTE — PROGRESS NOTES
[facility] GERIATR[facility]    Chief Complaint   Patient presents with    RECHECK     HPI:  Grace Vega is a 89 year old  (3/15/1929), who is being seen today for an episodic care visit at: THE [facility]) [[id number]].   She and her  moved to this facility 6/2022 to be closer to family and for a higher level of care. Previously lived in an apartment in [place] and received care through [facility].   Medical history significant for DDD lumbar with radiculopathy, gait instability, osteoarthritis, hypothyroidism, bronchiectasis, CKD stage 3, LE edema, macular degeneration, cognitive impairment, left peroneal vein DVT 3/2021.   She was seen in the ED 8/26/2022 for a mechanical fall with head strike. She slipped on the wet floor while doing laundry. CT head showed a right frontal scalp hematoma,mild to moderate diffuse cerebral atrophy and mild small vessel ischemic disease, no acute intracranial pathology. CT cervical spine showed multilevel degenerative changes and no acute fracture or subluxation.   She had COVID-19 6/2023 and was treated with Paxlovid.   She had left base pneumonia on CXR 6/27/2024, completed Augmentin 7/5/2024.       Today's concern is:      Yeast infection of the skin  Primary osteoarthritis of both knees  Chronic pain of both knees  DDD (degenerative disc disease), lumbar  Lumbar radiculopathy  Bronchiectasis without acute exacerbation (H)  Stage 3b chronic kidney disease (H)  Cognitive impairment  She is seen today to follow up on multiple medical issues. Her  has been hospitalized and returned home yesterday. Her stepson Olayinka Vega is present and assists with history. Patient reports the rash under her left breast is improving with nystatin cream. She feels the rash is related to when she had shingles years ago. She had steroid injections to her knees 7/9/2024 with relief. She looks tired today, but states she did well on her own when  her  was inpatient. Privately, macario reports she was anxious and teary, frequently calling family. Olayinka also reports issues with hygiene and incontinence. She had an episode of diarrhea with fecal incontinence last night. Family has found stool on her clothes and furniture. She denies having diarrhea or any GI issues. She reported nausea from guaifenesin to staff, but today denies having nausea.   Ambulates within the apartment with her walker, uses a wheelchair for longer distances. Receives assistance with bathing and dressing.     Allergies, and PMH/PSH reviewed in EPIC today    REVIEW OF SYSTEMS:  4 point ROS including Respiratory, CV, GI and , other than that noted in the HPI,  is negative    Objective:   BP (!) 149/68   Pulse 87   Temp 97.3  F (36.3  C)   Resp 20   Wt 61.2 kg (135 lb)   BMI 24.69 kg/m    GENERAL APPEARANCE:  Alert, in no distress   ENT:  [place], oropharynx clear  EYES:  conjunctiva and lids normal  RESP:  lungs clear bilaterally, no crackles or wheezes   CV:  regular rate and rhythm, no murmur, no edema   ABDOMEN:  soft, non-tender, no distension   M/S: up in chair. LOPEZ with good strength. Deformity of both knees, no acute inflammation   SKIN:  faint pink rash under left breast, no open areas   PSYCH:  oriented X 2-3, insight and memory impaired, affect and mood normal    Recent labs in [facility] reviewed by me today.       ASSESSMENT / PLAN:  (B37.2) Yeast infection of the skin  (primary encounter diagnosis)  Comment: improving   Plan: continue nystatin, keep clean and dry     (M17.0) Primary osteoarthritis of both knees  (M25.561,  M25.562,  G89.29) Chronic pain of both knees  (M51.36) DDD (degenerative disc disease), lumbar  (M54.16) Lumbar radiculopathy  Comment: chronic pain fairly well managed   Plan: continue tramadol, tylenol. Repeat steroid injections prn. Recommend wheelchair for distances outside of her apartment     (J47.9) Bronchiectasis without acute exacerbation  (H)  Comment: chronic cough unchanged   Plan: continue guaifenesin, mucinex, albuterol prn     (N18.32) Stage 3b chronic kidney disease (H)  Comment: creatinine 1.51 on 7/3/2024, baseline   Plan: BMP. Avoid nephrotoxins, renal dose meds     (R41.89) Cognitive impairment  Comment: gradual decline in cognition and functional status. She lacks insight re: her physical limitations   Plan: AL staff assistance with cares, meals, activities, med admin. Family has increased AL services.         Electronically signed by: FER Greene CNP

## 2024-09-09 ENCOUNTER — LAB REQUISITION (OUTPATIENT)
Dept: LAB | Facility: CLINIC | Age: 89
End: 2024-09-09
Payer: COMMERCIAL

## 2024-09-09 DIAGNOSIS — N17.9 ACUTE KIDNEY FAILURE, UNSPECIFIED (H): ICD-10-CM

## 2024-09-12 LAB
ANION GAP SERPL CALCULATED.3IONS-SCNC: 13 MMOL/L (ref 7–15)
BUN SERPL-MCNC: 24.7 MG/DL (ref 8–23)
CALCIUM SERPL-MCNC: 9 MG/DL (ref 8.8–10.4)
CHLORIDE SERPL-SCNC: 110 MMOL/L (ref 98–107)
CREAT SERPL-MCNC: 1.38 MG/DL (ref 0.51–0.95)
EGFRCR SERPLBLD CKD-EPI 2021: 35 ML/MIN/1.73M2
GLUCOSE SERPL-MCNC: 110 MG/DL (ref 70–99)
HCO3 SERPL-SCNC: 19 MMOL/L (ref 22–29)
POTASSIUM SERPL-SCNC: 4.5 MMOL/L (ref 3.4–5.3)
SODIUM SERPL-SCNC: 142 MMOL/L (ref 135–145)

## 2024-09-12 PROCEDURE — P9604 ONE-WAY ALLOW PRORATED TRIP: HCPCS | Mod: ORL | Performed by: NURSE PRACTITIONER

## 2024-09-12 PROCEDURE — 80048 BASIC METABOLIC PNL TOTAL CA: CPT | Mod: ORL | Performed by: NURSE PRACTITIONER

## 2024-09-12 PROCEDURE — 36415 COLL VENOUS BLD VENIPUNCTURE: CPT | Mod: ORL | Performed by: NURSE PRACTITIONER

## 2024-10-03 DIAGNOSIS — J47.1 BRONCHIECTASIS WITH ACUTE EXACERBATION (H): ICD-10-CM

## 2024-10-04 RX ORDER — GUAIFENESIN 600 MG/1
1 TABLET, EXTENDED RELEASE ORAL 2 TIMES DAILY
Qty: 180 TABLET | Refills: 97 | Status: SHIPPED | OUTPATIENT
Start: 2024-10-04

## 2024-10-15 DIAGNOSIS — M15.9 OSTEOARTHRITIS OF MULTIPLE JOINTS, UNSPECIFIED OSTEOARTHRITIS TYPE: ICD-10-CM

## 2024-10-22 ENCOUNTER — ASSISTED LIVING VISIT (OUTPATIENT)
Dept: GERIATRICS | Facility: CLINIC | Age: 89
End: 2024-10-22
Payer: COMMERCIAL

## 2024-10-22 VITALS
TEMPERATURE: 97.7 F | DIASTOLIC BLOOD PRESSURE: 85 MMHG | BODY MASS INDEX: 25.06 KG/M2 | HEART RATE: 94 BPM | WEIGHT: 137 LBS | SYSTOLIC BLOOD PRESSURE: 144 MMHG | RESPIRATION RATE: 14 BRPM

## 2024-10-22 DIAGNOSIS — G89.29 CHRONIC PAIN OF BOTH KNEES: ICD-10-CM

## 2024-10-22 DIAGNOSIS — M25.562 CHRONIC PAIN OF BOTH KNEES: ICD-10-CM

## 2024-10-22 DIAGNOSIS — R41.89 COGNITIVE IMPAIRMENT: ICD-10-CM

## 2024-10-22 DIAGNOSIS — M25.561 CHRONIC PAIN OF BOTH KNEES: ICD-10-CM

## 2024-10-22 DIAGNOSIS — J47.9 BRONCHIECTASIS WITHOUT COMPLICATION (H): ICD-10-CM

## 2024-10-22 DIAGNOSIS — M17.0 PRIMARY OSTEOARTHRITIS OF BOTH KNEES: Primary | ICD-10-CM

## 2024-10-22 DIAGNOSIS — N18.32 STAGE 3B CHRONIC KIDNEY DISEASE (H): ICD-10-CM

## 2024-10-22 DIAGNOSIS — M51.361 DEGENERATION OF INTERVERTEBRAL DISC OF LUMBAR REGION WITH LOWER EXTREMITY PAIN: ICD-10-CM

## 2024-10-22 PROCEDURE — 99349 HOME/RES VST EST MOD MDM 40: CPT | Performed by: NURSE PRACTITIONER

## 2024-10-22 NOTE — PROGRESS NOTES
[facility]ERIATRICS    Chief Complaint   Patient presents with    RECHECK     HPI:  Grace Vega is a 89 year old  (3/15/1929), who is being seen today for an episodic care visit at: THE [facility]) [[id number]].   She and her  moved to this facility 6/2022 to be closer to family and for a higher level of care. Previously lived in an apartment in [place] and received care through [facility].   Medical history significant for DDD lumbar with radiculopathy, gait instability, osteoarthritis, hypothyroidism, bronchiectasis, CKD stage 3, LE edema, macular degeneration, cognitive impairment, left peroneal vein DVT 3/2021.   She was seen in the ED 8/26/2022 for a mechanical fall with head strike. She slipped on the wet floor while doing laundry. CT head showed a right frontal scalp hematoma,mild to moderate diffuse cerebral atrophy and mild small vessel ischemic disease, no acute intracranial pathology. CT cervical spine showed multilevel degenerative changes and no acute fracture or subluxation.   She had COVID-19 6/2023 and was treated with Paxlovid.   She had left base pneumonia on CXR 6/27/2024, completed Augmentin 7/5/2024.       Today's concern is:      Primary osteoarthritis of both knees  Chronic pain of both knees  Degeneration of intervertebral disc of lumbar region with lower extremity pain  Bronchiectasis without complication (H)  Stage 3b chronic kidney disease (H)  Cognitive impairment  She is seen for follow up of multiple medical issues. Her  had a probable stroke last week and enrolled with hospice. She doesn't fully remember our conversation and this is the focus of much of our visit today.  Reports pain of both knees has been worse over the past 1-2 weeks. No back pain or other areas of pain. Reports her dry cough is unchanged, no shortness or breath. Ambulates with her walker in the apartment and a wheelchair is used for longer distances.  No falls. Receives assistance with bathing and dressing. Staff reports no new issues.     Allergies, and PMH/PSH reviewed in EPIC today    REVIEW OF SYSTEMS:  4 point ROS including Respiratory, CV, GI and , other than that noted in the HPI,  is negative    Objective:   BP (!) 144/85   Pulse 94   Temp 97.7  F (36.5  C)   Resp 14   Wt 62.1 kg (137 lb)   BMI 25.06 kg/m    GENERAL APPEARANCE:  Alert, in no distress   ENT:  [place]   EYES:  conjunctiva and lids normal  RESP:  lungs clear bilaterally, no crackles or wheezes   CV:  regular rate and rhythm, no murmur, no edema   ABDOMEN:  soft, non-tender, no distension   M/S: up in chair. LOPEZ with good strength. Deformity of both knees, no acute inflammation   SKIN:  no rashes or open areas   PSYCH:  oriented X 2-3, insight and memory impaired, affect and mood normal    Recent labs in [facility] reviewed by me today.       ASSESSMENT / PLAN:  (M17.0) Primary osteoarthritis of both knees  (primary encounter diagnosis)  (M25.561,  M25.562,  G89.29) Chronic pain of both knees  Comment: chronic pain, due for steroid injections. No falls or injuries   Plan: refer to Solange Carbajal NP for steroid injections both knees. Continue tylenol, tramadol, diclofenac gel. Walker and wheelchair for mobility   Discussed with her stepson Olayinka Vega     (M51.361) Degeneration of intervertebral disc of lumbar region with lower extremity pain  Comment: no back pain  Plan: as above     (J47.9) Bronchiectasis without complication (H)  Comment: cough unchanged, no acute symptoms   Plan: continue guaifenesin 600 mg tab bid and liquid prn     (N18.32) Stage 3b chronic kidney disease (H)  Comment: creatinine 1.38 on 9/12/2024, baseline   Plan: avoid nephrotoxins, renal dose meds. Encourage hydration     (R41.89) Cognitive impairment  Comment: gradual decline in cognition and functional status in recent months. Appears to be coping fairly well with her 's situation, but expect this may exacerbate  her general decline   Plan: AL staff assistance with cares, meals, activities, med admin. Family very supportive.           Electronically signed by: FER Greene CNP

## 2024-10-22 NOTE — LETTER
10/22/2024      Grace Vega  [facility]  [address] [address]          [facility] GERIATRICS    Chief Complaint   Patient presents with     RECHECK     HPI:  Grace Vega is a 89 year old  (3/15/1929), who is being seen today for an episodic care visit at: THE [facility]) [[id number]].   She and her  moved to this facility 6/2022 to be closer to family and for a higher level of care. Previously lived in an apartment in [place] and received care through [facility].   Medical history significant for DDD lumbar with radiculopathy, gait instability, osteoarthritis, hypothyroidism, bronchiectasis, CKD stage 3, LE edema, macular degeneration, cognitive impairment, left peroneal vein DVT 3/2021.   She was seen in the ED 8/26/2022 for a mechanical fall with head strike. She slipped on the wet floor while doing laundry. CT head showed a right frontal scalp hematoma,mild to moderate diffuse cerebral atrophy and mild small vessel ischemic disease, no acute intracranial pathology. CT cervical spine showed multilevel degenerative changes and no acute fracture or subluxation.   She had COVID-19 6/2023 and was treated with Paxlovid.   She had left base pneumonia on CXR 6/27/2024, completed Augmentin 7/5/2024.       Today's concern is:      Primary osteoarthritis of both knees  Chronic pain of both knees  Degeneration of intervertebral disc of lumbar region with lower extremity pain  Bronchiectasis without complication (H)  Stage 3b chronic kidney disease (H)  Cognitive impairment  She is seen for follow up of multiple medical issues. Her  had a probable stroke last week and enrolled with hospice. She doesn't fully remember our conversation and this is the focus of much of our visit today.  Reports pain of both knees has been worse over the past 1-2 weeks. No back pain or other areas of pain. Reports her dry cough is unchanged, no  shortness or breath. Ambulates with her walker in the apartment and a wheelchair is used for longer distances. No falls. Receives assistance with bathing and dressing. Staff reports no new issues.     Allergies, and PMH/PSH reviewed in EPIC today    REVIEW OF SYSTEMS:  4 point ROS including Respiratory, CV, GI and , other than that noted in the HPI,  is negative    Objective:   BP (!) 144/85   Pulse 94   Temp 97.7  F (36.5  C)   Resp 14   Wt 62.1 kg (137 lb)   BMI 25.06 kg/m    GENERAL APPEARANCE:  Alert, in no distress   ENT:  [place]   EYES:  conjunctiva and lids normal  RESP:  lungs clear bilaterally, no crackles or wheezes   CV:  regular rate and rhythm, no murmur, no edema   ABDOMEN:  soft, non-tender, no distension   M/S: up in chair. LOPEZ with good strength. Deformity of both knees, no acute inflammation   SKIN:  no rashes or open areas   PSYCH:  oriented X 2-3, insight and memory impaired, affect and mood normal    Recent labs in [facility] reviewed by me today.       ASSESSMENT / PLAN:  (M17.0) Primary osteoarthritis of both knees  (primary encounter diagnosis)  (M25.561,  M25.562,  G89.29) Chronic pain of both knees  Comment: chronic pain, due for steroid injections. No falls or injuries   Plan: refer to Solange Carbajal NP for steroid injections both knees. Continue tylenol, tramadol, diclofenac gel. Walker and wheelchair for mobility   Discussed with her niurkaon Olayinka Vega     (M51.361) Degeneration of intervertebral disc of lumbar region with lower extremity pain  Comment: no back pain  Plan: as above     (J47.9) Bronchiectasis without complication (H)  Comment: cough unchanged, no acute symptoms   Plan: continue guaifenesin 600 mg tab bid and liquid prn     (N18.32) Stage 3b chronic kidney disease (H)  Comment: creatinine 1.38 on 9/12/2024, baseline   Plan: avoid nephrotoxins, renal dose meds. Encourage hydration     (R41.89) Cognitive impairment  Comment: gradual decline in cognition and functional status in  recent months. Appears to be coping fairly well with her 's situation, but expect this may exacerbate her general decline   Plan: AL staff assistance with cares, meals, activities, med admin. Family very supportive.           Electronically signed by: FER Greene CNP                       Sincerely,        FER Greene CNP

## 2024-11-04 DIAGNOSIS — M51.369 DDD (DEGENERATIVE DISC DISEASE), LUMBAR: ICD-10-CM

## 2024-11-04 DIAGNOSIS — M15.9 OSTEOARTHRITIS OF MULTIPLE JOINTS, UNSPECIFIED OSTEOARTHRITIS TYPE: ICD-10-CM

## 2024-11-05 ENCOUNTER — ASSISTED LIVING VISIT (OUTPATIENT)
Dept: GERIATRICS | Facility: CLINIC | Age: 89
End: 2024-11-05
Payer: COMMERCIAL

## 2024-11-05 VITALS — WEIGHT: 137 LBS | BODY MASS INDEX: 25.06 KG/M2

## 2024-11-05 DIAGNOSIS — G89.29 CHRONIC PAIN OF BOTH KNEES: ICD-10-CM

## 2024-11-05 DIAGNOSIS — M25.562 CHRONIC PAIN OF BOTH KNEES: ICD-10-CM

## 2024-11-05 DIAGNOSIS — M17.0 PRIMARY OSTEOARTHRITIS OF BOTH KNEES: Primary | ICD-10-CM

## 2024-11-05 DIAGNOSIS — M25.561 CHRONIC PAIN OF BOTH KNEES: ICD-10-CM

## 2024-11-05 PROCEDURE — 20610 DRAIN/INJ JOINT/BURSA W/O US: CPT | Mod: 50 | Performed by: NURSE PRACTITIONER

## 2024-11-05 RX ORDER — TRAMADOL HYDROCHLORIDE 50 MG/1
TABLET ORAL
Qty: 60 TABLET | Refills: 2 | Status: SHIPPED | OUTPATIENT
Start: 2024-11-05

## 2024-11-05 NOTE — LETTER
11/5/2024      Grace Vega  [address] [address]        [facility] GERIATRICS    Chief Complaint   Patient presents with     RECHECK     Ortho- bilateral knee injections     HPI:  Grace Vega is a 89 year old  (3/15/1929), who is being seen today for an episodic care visit at: THE [facility] ([facility]) [[id number]].     Today's Visit:  Grace is seen today for repeat bilateral knee injections.  She has been receiving these injections every 3 months for a couple years now.  Last set of injections done 7/9/24.  Reports she gets ~1-1.5 months of relief, which she feels is worthwhile.  Still wishes she was more ambulatory but recognizes her generalized weakness and knee buckling makes this a safety issue.  Managing her pain daily with acetaminophen 1000mg BID and daily PRN, voltaren gel BID, tramadol 25mg daily and q6h PRN pain.  Denies recent falls, hospitalizations or ED visits.  Her  (Gary) lives with her and had a stroke recently, now on hospice services.  Has 24/7 care with private pay caregiver.  Grace is noted to have a cough today and complains of being cold.  There is a Covid outbreak in the facility currently-- writer sent message to patient's PCP notifying of cough and chills.  She plans to have nursing test Grace for Covid.      Allergies, and PMH/PSH reviewed in [facility] today.  REVIEW OF SYSTEMS:  As noted in HPI    Objective:   Wt 62.1 kg (137 lb)   BMI 25.06 kg/m    GENERAL APPEARANCE:  Alert, in no distress, cooperative  M/S:  (bilateral knees)  Skin: intact  Swelling: mild effusion to lateral aspect bilateral knees  Alignment: valgus deformity bilaterally  Tenderness to palpation:  +R lateral joint space   Dorsi/Plantar flexion:  5/5  Calves:  Soft and non-tender  Distal pulses are intact and equal bilaterally  ROM:  flexion to 115, extension lacking ~5-10 degrees.    Pain with extension, no pain with flexion.  PSYCH:   oriented X 3, memory impaired         Assessment/Plan:  (M17.0) Primary osteoarthritis of both knees  (primary encounter diagnosis)  (M25.561,  M25.562,  G89.29) Chronic pain of both knees  Grace would like to proceed again with bilateral knee injections for pain relief.  Understands risk/benefit.  Reviewed that onset of pain relief may take 1-2 weeks which she understands.    Plan:   -Proceed with bilateral knee injections today  -Can repeat q3m as needed  -Continue daily pain management with acetaminophen, tramadol and voltaren gel        Procedure:  After risks, benefits and complications of steroid injection were discussed with the patient she elected to proceed.  Using sterile technique, the area was first prepped with betadyne and an alcohol swab.  A 22 gauge needle was used to inject 40 mg DepoMedrol and 4 cc of 1% lidocaine into the knee on the right (via lateral approach) and left (via medial approach).  Grace  tolerated the procedure well without complications.       Electronically signed by: FER Cota CNP           Sincerely,        FER Cota CNP

## 2024-11-09 NOTE — PROGRESS NOTES
[facility] GERIATRICS    Chief Complaint   Patient presents with    RECHECK     Ortho- bilateral knee injections     HPI:  Grace Vega is a 89 year old  (3/15/1929), who is being seen today for an episodic care visit at: THE [facility] ([facility]) [[id number]].     Today's Visit:  Grace is seen today for repeat bilateral knee injections.  She has been receiving these injections every 3 months for a couple years now.  Last set of injections done 7/9/24.  Reports she gets ~1-1.5 months of relief, which she feels is worthwhile.  Still wishes she was more ambulatory but recognizes her generalized weakness and knee buckling makes this a safety issue.  Managing her pain daily with acetaminophen 1000mg BID and daily PRN, voltaren gel BID, tramadol 25mg daily and q6h PRN pain.  Denies recent falls, hospitalizations or ED visits.  Her  (Gary) lives with her and had a stroke recently, now on hospice services.  Has 24/7 care with private pay caregiver.  Grace is noted to have a cough today and complains of being cold.  There is a Covid outbreak in the facility currently-- writer sent message to patient's PCP notifying of cough and chills.  She plans to have nursing test Grace for Covid.      Allergies, and PMH/PSH reviewed in [facility] today.  REVIEW OF SYSTEMS:  As noted in HPI    Objective:   Wt 62.1 kg (137 lb)   BMI 25.06 kg/m    GENERAL APPEARANCE:  Alert, in no distress, cooperative  M/S:  (bilateral knees)  Skin: intact  Swelling: mild effusion to lateral aspect bilateral knees  Alignment: valgus deformity bilaterally  Tenderness to palpation:  +R lateral joint space   Dorsi/Plantar flexion:  5/5  Calves:  Soft and non-tender  Distal pulses are intact and equal bilaterally  ROM:  flexion to 115, extension lacking ~5-10 degrees.    Pain with extension, no pain with flexion.  PSYCH:  oriented X 3, memory impaired         Assessment/Plan:  (M17.0) Primary osteoarthritis of both knees  (primary  encounter diagnosis)  (M25.561,  M25.562,  G89.29) Chronic pain of both knees  Grace would like to proceed again with bilateral knee injections for pain relief.  Understands risk/benefit.  Reviewed that onset of pain relief may take 1-2 weeks which she understands.    Plan:   -Proceed with bilateral knee injections today  -Can repeat q3m as needed  -Continue daily pain management with acetaminophen, tramadol and voltaren gel        Procedure:  After risks, benefits and complications of steroid injection were discussed with the patient she elected to proceed.  Using sterile technique, the area was first prepped with betadyne and an alcohol swab.  A 22 gauge needle was used to inject 40 mg DepoMedrol and 4 cc of 1% lidocaine into the knee on the right (via lateral approach) and left (via medial approach).  Grace  tolerated the procedure well without complications.       Electronically signed by: FER Cota CNP

## 2024-12-24 DIAGNOSIS — R52 PAIN: ICD-10-CM

## 2024-12-25 ENCOUNTER — TELEPHONE (OUTPATIENT)
Dept: GERIATRICS | Facility: CLINIC | Age: 89
End: 2024-12-25
Payer: COMMERCIAL

## 2024-12-25 NOTE — TELEPHONE ENCOUNTER
Had a fall at 840am today with a large bump to head.  Called 911 but barrett refused to go.    Staff to continue to monitor for change in LOC, active bleeding, further falls.    Can call back if needing anything.    FER Nunez CNP

## 2024-12-26 RX ORDER — PSEUDOEPHED/ACETAMINOPH/DIPHEN 30MG-500MG
TABLET ORAL
Qty: 540 TABLET | Refills: 97 | Status: SHIPPED | OUTPATIENT
Start: 2024-12-26

## 2025-01-09 ENCOUNTER — ASSISTED LIVING VISIT (OUTPATIENT)
Dept: GERIATRICS | Facility: CLINIC | Age: OVER 89
End: 2025-01-09
Payer: COMMERCIAL

## 2025-01-09 VITALS
TEMPERATURE: 98.4 F | SYSTOLIC BLOOD PRESSURE: 158 MMHG | DIASTOLIC BLOOD PRESSURE: 84 MMHG | HEART RATE: 88 BPM | RESPIRATION RATE: 16 BRPM | BODY MASS INDEX: 24.14 KG/M2 | WEIGHT: 132 LBS

## 2025-01-09 DIAGNOSIS — G89.29 CHRONIC PAIN OF BOTH KNEES: ICD-10-CM

## 2025-01-09 DIAGNOSIS — J47.9 BRONCHIECTASIS WITHOUT COMPLICATION (H): ICD-10-CM

## 2025-01-09 DIAGNOSIS — N18.32 STAGE 3B CHRONIC KIDNEY DISEASE (H): ICD-10-CM

## 2025-01-09 DIAGNOSIS — E03.9 ACQUIRED HYPOTHYROIDISM: ICD-10-CM

## 2025-01-09 DIAGNOSIS — M51.361 DEGENERATION OF INTERVERTEBRAL DISC OF LUMBAR REGION WITH LOWER EXTREMITY PAIN: ICD-10-CM

## 2025-01-09 DIAGNOSIS — M25.562 CHRONIC PAIN OF BOTH KNEES: ICD-10-CM

## 2025-01-09 DIAGNOSIS — M17.0 PRIMARY OSTEOARTHRITIS OF BOTH KNEES: ICD-10-CM

## 2025-01-09 DIAGNOSIS — M25.561 CHRONIC PAIN OF BOTH KNEES: ICD-10-CM

## 2025-01-09 DIAGNOSIS — R41.89 COGNITIVE IMPAIRMENT: Primary | ICD-10-CM

## 2025-01-09 PROCEDURE — 99349 HOME/RES VST EST MOD MDM 40: CPT | Performed by: NURSE PRACTITIONER

## 2025-01-09 NOTE — LETTER
1/9/2025      Grace Nance [address] [address]        .[email or web address]        Sincerely,        FER Greene CNP    Electronically signed

## 2025-01-09 NOTE — PROGRESS NOTES
[facility] GERIATRICS    Chief Complaint   Patient presents with    RECHECK     The health plan new enrollment has happened. I have reviewed the  MDS, the preventative needs,  and facility care plan. The level of care is appropriate. I have reviewed the code status/advanced directives.       HPI:  Grace Vega is a 89 year old  (3/15/1929), who is being seen today for an episodic care visit at: THE [facility] ([facility]) [[id number]]. Today's concern is: ***    Allergies, and PMH/PSH reviewed in [facility] today.  REVIEW OF SYSTEMS:  {pvypgq28:715255}    Objective:   BP (!) 158/84   Pulse 88   Temp 98.4  F (36.9  C)   Resp 16   Wt 59.9 kg (132 lb)   BMI 24.14 kg/m    {Nursing home physical exam :922373}    {fgslab:738193}    Assessment/Plan:  {FGS DX2:186880}    MED REC REQUIRED{TIP  Click the link below to document or use med rec list, use list to pull in response :627380}  Post Medication Reconciliation Status: {MED REC LIST:472415}      Orders:  {fgsorders:937952}  ***    Electronically signed by: Cici Osborne ***         "she and her  aren't able to live together anymore, but she is \"adjusting.\" She looks thinner. States she's getting enough to eat. Denies feeling ill. Reports her chronic cough is unchanged. No shortness of breath or chest pain. Received steroid injections to her knees 11/5/2024 with good results. Ambulates with a walker within the apartment, uses a wheelchair for longer distances. Family has increased level of service for more assistance with dressing and hygiene. Staff reports no new issues.     Allergies, and PMH/PSH reviewed in EPIC today    REVIEW OF SYSTEMS:  4 point ROS including Respiratory, CV, GI and , other than that noted in the HPI,  is negative    Objective:   BP (!) 158/84   Pulse 88   Temp 98.4  F (36.9  C)   Resp 16   Wt 59.9 kg (132 lb)   BMI 24.14 kg/m    GENERAL APPEARANCE:  Alert, in no distress   ENT:  [place]   EYES:  conjunctiva and lids normal  RESP:  lungs clear bilaterally, no crackles or wheezes   CV:  regular rate and rhythm, no murmur, no edema   ABDOMEN:  soft, non-tender, no distension   M/S: gait slow with walker.  LOPEZ with good strength. No acute joint inflammation   SKIN:  no rashes or open areas   PSYCH:  oriented X 2-3, insight and memory impaired, affect and mood normal    Recent labs in [facility] reviewed by me today.         ASSESSMENT / PLAN:  (R41.89) Cognitive impairment  (primary encounter diagnosis)  Comment: gradual decline in cognition and functional status over the past several months, more significant decline since her 's change in status. She appears to be coping as well as expected.   Plan: AL staff assistance with cares, meals, activities, med admin  Message left for macario Vega     (J47.9) Bronchiectasis without complication (H)  Comment: no acute symptoms   Plan: continue guaifenesin     (N18.32) Stage 3b chronic kidney disease (H)  Comment: creatinine 1.38 on 9/12/2024  Plan: Hgb, BMP. 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 Continue tylenol, tramadol, diclofenac gel. Walker and wheelchair for mobility.     (E03.9) Acquired hypothyroidism  Comment: TSH 2.17 on 6/29/2023  Plan: TSH. Continue levothyroxine 88 mcg daily.         Electronically signed by: FER Greene CNP

## 2025-01-14 ENCOUNTER — PATIENT OUTREACH (OUTPATIENT)
Dept: GERIATRIC MEDICINE | Facility: CLINIC | Age: OVER 89
End: 2025-01-14
Payer: COMMERCIAL

## 2025-01-14 NOTE — PROGRESS NOTES
[facility] Care Coordination Contact    Member enrolled in [facility] with [employer] effective 1/1/25.     Reviewed Epic notes and no triggering events noted - no recent hospitalizations or ED visits. Daily assistance (including med management) provided by assisted living facility. Recent visit from onsite NP. Has support from family in place.      Writer will continue to follow via EMR and is available as needed.     Samantha Beal RN  [facility]  Cell: [phone]

## 2025-01-15 ENCOUNTER — LAB REQUISITION (OUTPATIENT)
Dept: LAB | Facility: CLINIC | Age: OVER 89
End: 2025-01-15
Payer: COMMERCIAL

## 2025-01-15 DIAGNOSIS — E03.8 OTHER SPECIFIED HYPOTHYROIDISM: ICD-10-CM

## 2025-01-15 DIAGNOSIS — N17.9 ACUTE KIDNEY FAILURE, UNSPECIFIED: ICD-10-CM

## 2025-01-16 LAB — HGB BLD-MCNC: 12.5 G/DL (ref 11.7–15.7)

## 2025-01-16 PROCEDURE — 80048 BASIC METABOLIC PNL TOTAL CA: CPT | Mod: ORL | Performed by: NURSE PRACTITIONER

## 2025-01-16 PROCEDURE — P9603 ONE-WAY ALLOW PRORATED MILES: HCPCS | Mod: ORL | Performed by: NURSE PRACTITIONER

## 2025-01-16 PROCEDURE — 36415 COLL VENOUS BLD VENIPUNCTURE: CPT | Mod: ORL | Performed by: NURSE PRACTITIONER

## 2025-01-16 PROCEDURE — 84443 ASSAY THYROID STIM HORMONE: CPT | Mod: ORL | Performed by: NURSE PRACTITIONER

## 2025-01-16 PROCEDURE — 85018 HEMOGLOBIN: CPT | Mod: ORL | Performed by: NURSE PRACTITIONER

## 2025-01-17 LAB
ANION GAP SERPL CALCULATED.3IONS-SCNC: 13 MMOL/L (ref 7–15)
BUN SERPL-MCNC: 18.4 MG/DL (ref 8–23)
CALCIUM SERPL-MCNC: 9.1 MG/DL (ref 8.8–10.4)
CHLORIDE SERPL-SCNC: 107 MMOL/L (ref 98–107)
CREAT SERPL-MCNC: 1.14 MG/DL (ref 0.51–0.95)
EGFRCR SERPLBLD CKD-EPI 2021: 44 ML/MIN/1.73M2
GLUCOSE SERPL-MCNC: 94 MG/DL (ref 70–99)
HCO3 SERPL-SCNC: 21 MMOL/L (ref 22–29)
POTASSIUM SERPL-SCNC: 4.5 MMOL/L (ref 3.4–5.3)
SODIUM SERPL-SCNC: 141 MMOL/L (ref 135–145)
TSH SERPL DL<=0.005 MIU/L-ACNC: 2.76 UIU/ML (ref 0.3–4.2)

## 2025-01-20 DIAGNOSIS — R11.0 NAUSEA: Primary | ICD-10-CM

## 2025-01-21 ENCOUNTER — ASSISTED LIVING VISIT (OUTPATIENT)
Dept: GERIATRICS | Facility: CLINIC | Age: OVER 89
End: 2025-01-21
Payer: COMMERCIAL

## 2025-01-21 VITALS
BODY MASS INDEX: 24.14 KG/M2 | HEART RATE: 88 BPM | SYSTOLIC BLOOD PRESSURE: 158 MMHG | TEMPERATURE: 98.4 F | DIASTOLIC BLOOD PRESSURE: 84 MMHG | WEIGHT: 132 LBS | RESPIRATION RATE: 16 BRPM

## 2025-01-21 DIAGNOSIS — M25.562 CHRONIC PAIN OF BOTH KNEES: ICD-10-CM

## 2025-01-21 DIAGNOSIS — G89.29 CHRONIC PAIN OF BOTH KNEES: ICD-10-CM

## 2025-01-21 DIAGNOSIS — N18.32 STAGE 3B CHRONIC KIDNEY DISEASE (H): ICD-10-CM

## 2025-01-21 DIAGNOSIS — E03.9 ACQUIRED HYPOTHYROIDISM: ICD-10-CM

## 2025-01-21 DIAGNOSIS — F43.21 GRIEF: ICD-10-CM

## 2025-01-21 DIAGNOSIS — R41.89 COGNITIVE IMPAIRMENT: ICD-10-CM

## 2025-01-21 DIAGNOSIS — M51.361 DEGENERATION OF INTERVERTEBRAL DISC OF LUMBAR REGION WITH LOWER EXTREMITY PAIN: ICD-10-CM

## 2025-01-21 DIAGNOSIS — J47.1 BRONCHIECTASIS WITH ACUTE EXACERBATION (H): Primary | ICD-10-CM

## 2025-01-21 DIAGNOSIS — M17.0 PRIMARY OSTEOARTHRITIS OF BOTH KNEES: ICD-10-CM

## 2025-01-21 DIAGNOSIS — M25.561 CHRONIC PAIN OF BOTH KNEES: ICD-10-CM

## 2025-01-21 RX ORDER — ONDANSETRON 4 MG/1
TABLET, ORALLY DISINTEGRATING ORAL
Qty: 30 TABLET | Refills: 97 | Status: SHIPPED | OUTPATIENT
Start: 2025-01-21

## 2025-01-21 NOTE — LETTER
1/21/2025      Grace Vega  [facility] [address] [facility] [address]  [facility] [address]        No notes on file      Sincerely,        FER Greene CNP    Electronically signed

## 2025-01-21 NOTE — PROGRESS NOTES
[facility] GERIATRICS    Chief Complaint   Patient presents with    RECHECK     HPI:  Grace Vega is a 89 year old  (3/15/1929), who is being seen today for an episodic care visit at: [facility]) [[id number]]. Today's concern is: ***    Allergies, and PMH/PSH reviewed in EPIC today.  REVIEW OF SYSTEMS:  {kxmiim76:430989}    Objective:   BP (!) 158/84   Pulse 88   Temp 98.4  F (36.9  C)   Resp 16   Wt 59.9 kg (132 lb)   BMI 24.14 kg/m    {Nursing home physical exam :956639}    {fgslab:327257}    Assessment/Plan:  {S DX2:000101}    MED REC REQUIRED{TIP  Click the link below to document or use med rec list, use list to pull in response :125080}  Post Medication Reconciliation Status: {MED REC LIST:680965}      Orders:  {fgsorders:774348}  ***    Electronically signed by: Cici Osborne ***

## 2025-01-22 RX ORDER — AZITHROMYCIN 250 MG/1
TABLET, FILM COATED ORAL
Qty: 6 TABLET | Refills: 0 | Status: SHIPPED | OUTPATIENT
Start: 2025-01-22 | End: 2025-01-27

## 2025-01-22 NOTE — PROGRESS NOTES
Grace BARRAGAN 3/15/1929    Orders 2025:  Azithromycin 250 mg tab, 2 tabs po today then 1 tab po daily X 4 days for bronchiectasis with acute exacerbation   Sent to pharmacy        FER Greene CNP on 2025 at 8:56 AM

## 2025-03-04 ENCOUNTER — ASSISTED LIVING VISIT (OUTPATIENT)
Dept: GERIATRICS | Facility: CLINIC | Age: OVER 89
End: 2025-03-04
Payer: COMMERCIAL

## 2025-03-04 VITALS
WEIGHT: 133 LBS | DIASTOLIC BLOOD PRESSURE: 85 MMHG | BODY MASS INDEX: 24.33 KG/M2 | SYSTOLIC BLOOD PRESSURE: 149 MMHG | HEART RATE: 93 BPM | TEMPERATURE: 97.4 F | RESPIRATION RATE: 20 BRPM

## 2025-03-04 NOTE — PROGRESS NOTES
[facility] GERIATRICS    Chief Complaint   Patient presents with    RECHECK     HPI:  Grace Vega is a 89 year old  (3/15/1929), who is being seen today for an episodic care visit at: [facility]) [[id number]]. Today's concern is: ***    Allergies, and PMH/PSH reviewed in EPIC today.  REVIEW OF SYSTEMS:  {jlykvd60:809171}    Objective:   BP (!) 149/85   Pulse 93   Temp 97.4  F (36.3  C)   Resp 20   Wt 60.3 kg (133 lb)   BMI 24.33 kg/m    {Nursing home physical exam :442759}    {fgslab:642649}    Assessment/Plan:  {S DX2:964290}    MED REC REQUIRED{TIP  Click the link below to document or use med rec list, use list to pull in response :373575}  Post Medication Reconciliation Status: {MED REC LIST:189124}      Orders:  {fgsorders:315290}  ***    Electronically signed by: Cici Osborne ***

## 2025-03-04 NOTE — LETTER
3/4/2025      Grace Vega  [facility] [address]  [facility] [address]        No notes on file      Sincerely,        FER Greene CNP    Electronically signed

## 2025-03-13 ENCOUNTER — ASSISTED LIVING VISIT (OUTPATIENT)
Dept: GERIATRICS | Facility: CLINIC | Age: OVER 89
End: 2025-03-13
Payer: COMMERCIAL

## 2025-03-13 DIAGNOSIS — M17.0 PRIMARY OSTEOARTHRITIS OF BOTH KNEES: Primary | ICD-10-CM

## 2025-03-13 DIAGNOSIS — M25.561 CHRONIC PAIN OF BOTH KNEES: ICD-10-CM

## 2025-03-13 DIAGNOSIS — G89.29 CHRONIC PAIN OF BOTH KNEES: ICD-10-CM

## 2025-03-13 DIAGNOSIS — M25.562 CHRONIC PAIN OF BOTH KNEES: ICD-10-CM

## 2025-03-13 NOTE — LETTER
3/13/2025      Grace Vega  [facility]  [address]  [facility] [address]        [facility] GERIATRICS    Chief Complaint   Patient presents with     RECHECK     Bilateral knee injections     HPI:  Grace Vega is a 89 year old  (3/15/1929), who is being seen today for an episodic care visit at: THE [facility] ([facility]) [[id number]].     Today's Visit:  Grace is seen today in her apartment for repeat bilateral knee injections.  Last set of injections were 11/5/24.  She continues to have pain improvements with the injections and would like to have them ongoing.  Sadly, Grace's  passed away recently.  She is understandably tearful at different points during our visit today.  Reports that her knees are more painful lately.  No recent injuries or falls.  She continues to wish she could be more ambulatory but recognizes she is a falls risk.  Will continue to plan on q3m knee injections to help keep her comfortable.      Allergies, and PMH/PSH reviewed in [facility] today.  REVIEW OF SYSTEMS:  As noted in HPI    Objective:   There were no vitals taken for this visit.  GENERAL APPEARANCE:  Alert, in no distress, cooperative  M/S:   Bilateral knees- skin intact, no joint effusion, valgus deformity.  Tenderness with palpation of medial joint space on L knee.  Diminished ROM: flexion 110 with extension lacking 5 degrees bilaterally.  Crepitus felt.    PSYCH:  oriented to person, place, tearful        Assessment/Plan:  (M17.0) Primary osteoarthritis of both knees  (primary encounter diagnosis)  (M25.561,  M25.562,  G89.29) Chronic pain of both knees  Grace would like to proceed again with bilateral knee injections for pain relief.  Understands risk/benefit.  Reviewed that onset of pain relief may take 1-2 weeks which she understands.    Plan:   -Proceed with bilateral knee injections today  -Plan to repeat again June 2025  -Continue daily pain management with  acetaminophen, tramadol and voltaren gel  -Could consider increasing frequency of voltaren gel to TID or switching to topical lidocaine for improved daily pain relief        Procedure:  After risks, benefits and complications of steroid injection were discussed with the patient she elected to proceed. Using sterile technique, the area was first prepped with betadyne and an alcohol swab. A 22 gauge needle was used to inject 40 mg DepoMedrol and 4 cc of 1% lidocaine into the knee on the right (via lateral approach) and left (via medial approach). Grace tolerated the procedure well without complications.       Electronically signed by: FER Cota CNP           Sincerely,        FER Cota CNP    Electronically signed

## 2025-03-13 NOTE — PROGRESS NOTES
[facility] GERIATRICS    Chief Complaint   Patient presents with    RECHECK     Bilateral knee injections     HPI:  Grace Vega is a 89 year old  (3/15/1929), who is being seen today for an episodic care visit at: [facility]) [[id number]].     Today's Visit:  Grace is seen today in her apartment for repeat bilateral knee injections.  Last set of injections were 11/5/24.  She continues to have pain improvements with the injections and would like to have them ongoing.  Sadly, Grace's  passed away recently.  She is understandably tearful at different points during our visit today.  Reports that her knees are more painful lately.  No recent injuries or falls.  She continues to wish she could be more ambulatory but recognizes she is a falls risk.  Will continue to plan on q3m knee injections to help keep her comfortable.      Allergies, and PMH/PSH reviewed in EPIC today.  REVIEW OF SYSTEMS:  As noted in HPI    Objective:   There were no vitals taken for this visit.  GENERAL APPEARANCE:  Alert, in no distress, cooperative  M/S:   Bilateral knees- skin intact, no joint effusion, valgus deformity.  Tenderness with palpation of medial joint space on L knee.  Diminished ROM: flexion 110 with extension lacking 5 degrees bilaterally.  Crepitus felt.    PSYCH:  oriented to person, place, tearful        Assessment/Plan:  (M17.0) Primary osteoarthritis of both knees  (primary encounter diagnosis)  (M25.561,  M25.562,  G89.29) Chronic pain of both knees  Grace would like to proceed again with bilateral knee injections for pain relief.  Understands risk/benefit.  Reviewed that onset of pain relief may take 1-2 weeks which she understands.    Plan:   -Proceed with bilateral knee injections today  -Plan to repeat again June 2025  -Continue daily pain management with acetaminophen, tramadol and voltaren gel  -Could consider increasing frequency of voltaren gel to TID or switching to topical  lidocaine for improved daily pain relief        Procedure:  After risks, benefits and complications of steroid injection were discussed with the patient she elected to proceed. Using sterile technique, the area was first prepped with betadyne and an alcohol swab. A 22 gauge needle was used to inject 40 mg DepoMedrol and 4 cc of 1% lidocaine into the knee on the right (via lateral approach) and left (via medial approach). Grace tolerated the procedure well without complications.       Electronically signed by: FER Cota CNP

## 2025-03-18 ENCOUNTER — LAB REQUISITION (OUTPATIENT)
Dept: LAB | Facility: CLINIC | Age: OVER 89
End: 2025-03-18
Payer: COMMERCIAL

## 2025-03-18 DIAGNOSIS — E03.8 OTHER SPECIFIED HYPOTHYROIDISM: ICD-10-CM

## 2025-03-18 DIAGNOSIS — N17.0 ACUTE KIDNEY FAILURE WITH TUBULAR NECROSIS: ICD-10-CM

## 2025-03-20 ENCOUNTER — ASSISTED LIVING VISIT (OUTPATIENT)
Dept: GERIATRICS | Facility: CLINIC | Age: OVER 89
End: 2025-03-20
Payer: COMMERCIAL

## 2025-03-20 VITALS
WEIGHT: 132.4 LBS | DIASTOLIC BLOOD PRESSURE: 99 MMHG | RESPIRATION RATE: 16 BRPM | SYSTOLIC BLOOD PRESSURE: 187 MMHG | BODY MASS INDEX: 24.22 KG/M2 | HEART RATE: 100 BPM | TEMPERATURE: 98.2 F

## 2025-03-20 DIAGNOSIS — M15.9 OSTEOARTHRITIS OF MULTIPLE JOINTS, UNSPECIFIED OSTEOARTHRITIS TYPE: ICD-10-CM

## 2025-03-20 LAB
ANION GAP SERPL CALCULATED.3IONS-SCNC: 13 MMOL/L (ref 7–15)
BUN SERPL-MCNC: 24 MG/DL (ref 8–23)
CALCIUM SERPL-MCNC: 9.5 MG/DL (ref 8.8–10.4)
CHLORIDE SERPL-SCNC: 108 MMOL/L (ref 98–107)
CREAT SERPL-MCNC: 1.41 MG/DL (ref 0.51–0.95)
EGFRCR SERPLBLD CKD-EPI 2021: 34 ML/MIN/1.73M2
GLUCOSE SERPL-MCNC: 97 MG/DL (ref 70–99)
HCO3 SERPL-SCNC: 24 MMOL/L (ref 22–29)
HGB BLD-MCNC: 13.2 G/DL (ref 11.7–15.7)
POTASSIUM SERPL-SCNC: 4.4 MMOL/L (ref 3.4–5.3)
SODIUM SERPL-SCNC: 145 MMOL/L (ref 135–145)
TSH SERPL DL<=0.005 MIU/L-ACNC: 3.96 UIU/ML (ref 0.3–4.2)

## 2025-03-20 PROCEDURE — 84443 ASSAY THYROID STIM HORMONE: CPT | Mod: ORL | Performed by: NURSE PRACTITIONER

## 2025-03-20 PROCEDURE — P9604 ONE-WAY ALLOW PRORATED TRIP: HCPCS | Mod: ORL | Performed by: NURSE PRACTITIONER

## 2025-03-20 PROCEDURE — 80048 BASIC METABOLIC PNL TOTAL CA: CPT | Mod: ORL | Performed by: NURSE PRACTITIONER

## 2025-03-20 PROCEDURE — 36415 COLL VENOUS BLD VENIPUNCTURE: CPT | Mod: ORL | Performed by: NURSE PRACTITIONER

## 2025-03-20 PROCEDURE — 85018 HEMOGLOBIN: CPT | Mod: ORL | Performed by: NURSE PRACTITIONER

## 2025-03-20 NOTE — LETTER
3/20/2025      Grace Vega  [facility] [address]  [facility] [address]        No notes on file      Sincerely,        FER Greene CNP    Electronically signed

## 2025-03-20 NOTE — PROGRESS NOTES
[facility] GERIATRICS    Chief Complaint   Patient presents with    RECHECK     HPI:  Grace Vega is a 89 year old  (3/15/1929), who is being seen today for an episodic care visit at: [facility]) [[id number]]. Today's concern is: ***    Allergies, and PMH/PSH reviewed in EPIC today.  REVIEW OF SYSTEMS:  {enpvll32:096780}    Objective:   BP (!) 187/99   Pulse 100   Temp 98.2  F (36.8  C)   Resp 16   Wt 60.1 kg (132 lb 6.4 oz)   BMI 24.22 kg/m    {Nursing home physical exam :946260}    {fgslab:350120}    Assessment/Plan:  {S DX2:263943}    MED REC REQUIRED{TIP  Click the link below to document or use med rec list, use list to pull in response :829779}  Post Medication Reconciliation Status: {MED REC LIST:622847}      Orders:  {fgsorders:320592}  ***    Electronically signed by: Cici Osborne ***

## 2025-04-27 ENCOUNTER — HEALTH MAINTENANCE LETTER (OUTPATIENT)
Age: OVER 89
End: 2025-04-27

## 2025-05-06 DIAGNOSIS — J47.1 BRONCHIECTASIS WITH ACUTE EXACERBATION (H): ICD-10-CM

## 2025-05-06 RX ORDER — ALBUTEROL SULFATE 90 UG/1
2 INHALANT RESPIRATORY (INHALATION) 4 TIMES DAILY PRN
Qty: 18 G | Refills: 4 | Status: SHIPPED | OUTPATIENT
Start: 2025-05-06

## 2025-05-15 ENCOUNTER — ASSISTED LIVING VISIT (OUTPATIENT)
Dept: GERIATRICS | Facility: CLINIC | Age: OVER 89
End: 2025-05-15
Payer: COMMERCIAL

## 2025-05-15 VITALS
TEMPERATURE: 97.8 F | BODY MASS INDEX: 25.06 KG/M2 | RESPIRATION RATE: 16 BRPM | HEART RATE: 78 BPM | SYSTOLIC BLOOD PRESSURE: 131 MMHG | DIASTOLIC BLOOD PRESSURE: 83 MMHG | WEIGHT: 137 LBS

## 2025-05-15 DIAGNOSIS — M25.562 CHRONIC PAIN OF BOTH KNEES: ICD-10-CM

## 2025-05-15 DIAGNOSIS — M51.361 DEGENERATION OF INTERVERTEBRAL DISC OF LUMBAR REGION WITH LOWER EXTREMITY PAIN: ICD-10-CM

## 2025-05-15 DIAGNOSIS — M15.9 OSTEOARTHRITIS OF MULTIPLE JOINTS, UNSPECIFIED OSTEOARTHRITIS TYPE: ICD-10-CM

## 2025-05-15 DIAGNOSIS — R41.89 COGNITIVE IMPAIRMENT: ICD-10-CM

## 2025-05-15 DIAGNOSIS — M25.561 CHRONIC PAIN OF BOTH KNEES: ICD-10-CM

## 2025-05-15 DIAGNOSIS — G89.29 CHRONIC PAIN OF BOTH KNEES: ICD-10-CM

## 2025-05-15 DIAGNOSIS — M54.6 ACUTE MIDLINE THORACIC BACK PAIN: Primary | ICD-10-CM

## 2025-05-15 PROCEDURE — 99349 HOME/RES VST EST MOD MDM 40: CPT | Performed by: NURSE PRACTITIONER

## 2025-05-15 RX ORDER — TRAMADOL HYDROCHLORIDE 50 MG/1
25 TABLET ORAL 2 TIMES DAILY
Qty: 30 TABLET | Refills: 1 | Status: SHIPPED | OUTPATIENT
Start: 2025-05-15

## 2025-05-15 NOTE — LETTER
5/15/2025      Grace Vega  [facility] [address]  [facility] [address]        No notes on file      Sincerely,        FER Nicholson CNP    Electronically signed   "the bed. Reports pain is worse and \"sharp\"  with movement, but also has kept her awake at night. She slept in her recliner last night due to pain. Received a steroid injection to both knees 3/13/2025 with relief. Ambulates short distances with walker, staff takes her to the dining room in a wheelchair. Receives assistance with bathing and dressing.       Allergies, and PMH/PSH reviewed in [facility]IC today    REVIEW OF SYSTEMS:  4 point ROS including Respiratory, CV, GI and , other than that noted in the HPI,  is negative    Objective:   /83   Pulse 78   Temp 97.8  F (36.6  C)   Resp 16   Wt 62.1 kg (137 lb)   BMI 25.06 kg/m    GENERAL APPEARANCE:  Alert, in no distress  ENT:  [place]   EYES:  conjunctiva and lids normal  RESP:  lungs clear bilaterally, no wheezing   CV:  regular rate and rhythm, no murmur, no edema   ABDOMEN:  soft, non-tender, no distension   M/S: wheelchair.  LOPEZ. No tenderness to palpation over spine. Deformity both knees, no acute inflammation   SKIN:  no visible rashes or open areas   PSYCH:  oriented X 2-3, insight and memory impaired, affect and mood normal    Recent labs in [facility] reviewed by me today.       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 Concerning for compression fracture.   Plan:XR thoracic and lumbar spine. Increase tramadol to 25 mg bid and daily prn. Continue tylenol 1000 mg bid and daily prn.   Discussed with staff.     (M25.561,  M25.562,  G89.29) Chronic pain of both knees  Comment: some relief after steroid injections 3/13/2025  Plan: pain meds as above, continue diclofenac gel tid. Repeat steroid injections every 3 months.     (R41.89) Cognitive impairment  Comment: gradual decline in both cognition " and functional status   Plan: AL staff assistance with cares, meals, activities, med admin          Electronically signed by: FER Muniz CNP                             Sincerely,        FER Nicholson CNP    Electronically signed

## 2025-05-15 NOTE — PROGRESS NOTES
[facility] GERIATRICS    Chief Complaint   Patient presents with    RECHECK     HPI:  Grace Vega is a 89 year old  (3/15/1929), who is being seen today for an episodic care visit at: [facility]) [[id number]]. Today's concern is: ***    Allergies, and PMH/PSH reviewed in EPIC today.  REVIEW OF SYSTEMS:  {pizbrc59:679027}    Objective:   /83   Pulse 78   Temp 97.8  F (36.6  C)   Resp 16   Wt 62.1 kg (137 lb)   BMI 25.06 kg/m    {Nursing home physical exam :656460}    {fgslab:322415}    Assessment/Plan:  {FGS DX2:785212}    MED REC REQUIRED{TIP  Click the link below to document or use med rec list, use list to pull in response :963552}  Post Medication Reconciliation Status: {MED REC LIST:618023}      Orders:  {fgsorders:750558}  ***    Electronically signed by: Cici Osborne ***       last night due to pain. Received a steroid injection to both knees 3/13/2025 with relief. Ambulates short distances with walker, staff takes her to the dining room in a wheelchair. Receives assistance with bathing and dressing.       Allergies, and PMH/PSH reviewed in EPIC today    REVIEW OF SYSTEMS:  4 point ROS including Respiratory, CV, GI and , other than that noted in the HPI,  is negative    Objective:   /83   Pulse 78   Temp 97.8  F (36.6  C)   Resp 16   Wt 62.1 kg (137 lb)   BMI 25.06 kg/m    GENERAL APPEARANCE:  Alert, in no distress  ENT:  [place]   EYES:  conjunctiva and lids normal  RESP:  lungs clear bilaterally, no wheezing   CV:  regular rate and rhythm, no murmur, no edema   ABDOMEN:  soft, non-tender, no distension   M/S: wheelchair.  LOPEZ. No tenderness to palpation over spine. Deformity both knees, no acute inflammation   SKIN:  no visible rashes or open areas   PSYCH:  oriented X 2-3, insight and memory impaired, affect and mood normal    Recent labs in [facility] reviewed by me today.       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 Concerning for compression fracture.   Plan:XR thoracic and lumbar spine. Increase tramadol to 25 mg bid and daily prn. Continue tylenol 1000 mg bid and daily prn.   Discussed with staff.     (M25.561,  M25.562,  G89.29) Chronic pain of both knees  Comment: some relief after steroid injections 3/13/2025  Plan: pain meds as above, continue diclofenac gel tid. Repeat steroid injections every 3 months.     (R41.89) Cognitive impairment  Comment: gradual decline in both cognition and functional status   Plan: AL staff assistance with cares, meals, activities, med admin          Electronically signed  by: FER Muniz CNP

## 2025-05-19 ENCOUNTER — ASSISTED LIVING VISIT (OUTPATIENT)
Dept: GERIATRICS | Facility: CLINIC | Age: OVER 89
End: 2025-05-19
Payer: COMMERCIAL

## 2025-05-19 VITALS
RESPIRATION RATE: 16 BRPM | SYSTOLIC BLOOD PRESSURE: 131 MMHG | TEMPERATURE: 97.8 F | HEART RATE: 78 BPM | DIASTOLIC BLOOD PRESSURE: 83 MMHG | WEIGHT: 137.6 LBS | BODY MASS INDEX: 25.17 KG/M2

## 2025-05-19 DIAGNOSIS — M15.9 OSTEOARTHRITIS OF MULTIPLE JOINTS, UNSPECIFIED OSTEOARTHRITIS TYPE: ICD-10-CM

## 2025-05-19 DIAGNOSIS — Z78.9 IMPAIRED MOBILITY AND ACTIVITIES OF DAILY LIVING: ICD-10-CM

## 2025-05-19 DIAGNOSIS — R41.89 COGNITIVE IMPAIRMENT: ICD-10-CM

## 2025-05-19 DIAGNOSIS — M54.6 ACUTE MIDLINE THORACIC BACK PAIN: Primary | ICD-10-CM

## 2025-05-19 DIAGNOSIS — N18.32 STAGE 3B CHRONIC KIDNEY DISEASE (H): ICD-10-CM

## 2025-05-19 DIAGNOSIS — M17.0 PRIMARY OSTEOARTHRITIS OF BOTH KNEES: ICD-10-CM

## 2025-05-19 DIAGNOSIS — M51.361 DEGENERATION OF INTERVERTEBRAL DISC OF LUMBAR REGION WITH LOWER EXTREMITY PAIN: ICD-10-CM

## 2025-05-19 DIAGNOSIS — Z74.09 IMPAIRED MOBILITY AND ACTIVITIES OF DAILY LIVING: ICD-10-CM

## 2025-05-19 DIAGNOSIS — J47.9 BRONCHIECTASIS WITHOUT ACUTE EXACERBATION (H): ICD-10-CM

## 2025-05-19 PROCEDURE — 99349 HOME/RES VST EST MOD MDM 40: CPT | Performed by: NURSE PRACTITIONER

## 2025-05-19 RX ORDER — LIDOCAINE 4 G/G
2 PATCH TOPICAL EVERY 24 HOURS
Qty: 60 PATCH | Refills: 11 | Status: SHIPPED | OUTPATIENT
Start: 2025-05-19

## 2025-05-19 NOTE — LETTER
2025      Grace Vega  [facility]  [address]  [facility] [address]          [facility] GERIATRICS    Chief Complaint   Patient presents with     RECHECK       HPI:  Grace Vega is a 89 year old  (3/15/1929), who is being seen today for an episodic care visit at: [facility] ([facility]) [[id number]].   She and her  moved to this facility 2022 to be closer to family and for a higher level of care. Previously lived in an apartment in [place] and received care through [facility]. Her   2025.   Medical history significant for DDD lumbar with radiculopathy, gait instability, osteoarthritis, chronic knee pain, hypothyroidism, bronchiectasis, CKD stage 3, LE edema, macular degeneration, cognitive impairment, left peroneal vein DVT 3/2021.   She was seen in the ED 2022 for a mechanical fall with head strike. She slipped on the wet floor while doing laundry. CT head showed a right frontal scalp hematoma,mild to moderate diffuse cerebral atrophy and mild small vessel ischemic disease, no acute intracranial pathology. CT cervical spine showed multilevel degenerative changes and no acute fracture or subluxation.   She had COVID-19 2023 and was treated with Paxlovid.   She had left base pneumonia on CXR 2024, treated with Augmentin.       Today's concern is:      Acute midline thoracic back pain  Degeneration of intervertebral disc of lumbar region with lower extremity pain  Osteoarthritis of multiple joints, unspecified osteoarthritis type  Primary osteoarthritis of both knees  Impaired mobility and activities of daily living  Stage 3b chronic kidney disease (H)  Bronchiectasis without acute exacerbation (H)  Cognitive impairment  She is seen for follow up of multiple medical issues, as well as acute back pain. XR done 2025 showed degenerative changes, no fractures. Tramadol was increased 5/15/2025. She  "is a fair historian. Reports pain remains severe with movement, \"sharp, grabbing pain\" across her mid to low back. She's having trouble lifting her legs to transfer in and out of bed and reports she slept sideways with her feet on the floor last night. Denies fall or injury. She's otherwise feeling well. Denies respiratory symptoms. Good appetite. Able to ambulate within the apartment with her walker,  but is using her wheelchair today.  Receives assistance with bathing and dressing. She has been independent with toileting and pm cares.   Spoke with her stepson Olayinka Vega, who reports family has noticed a more significant decline in cognition over the past few weeks.   Upon chart review, tramadol was increased 5/15/2025, but this has not been implemented-asked nurse to follow up with pharmacy.       Allergies, and PMH/PSH reviewed in EPIC today    REVIEW OF SYSTEMS:  4 point ROS including Respiratory, CV, GI and , other than that noted in the HPI,  is negative    Objective:   /83   Pulse 78   Temp 97.8  F (36.6  C)   Resp 16   Wt 62.4 kg (137 lb 9.6 oz)   BMI 25.17 kg/m    GENERAL APPEARANCE:  Alert, in no distress  ENT:  [place]   EYES:  conjunctiva and lids normal  RESP:  no respiratory distress   CV:  no edema   ABDOMEN:  soft, non-tender, no distension   M/S: wheelchair.  LOPEZ. Mild tenderness to palpation over mid to low back.  Degenerative changes both knees, no acute inflammation   SKIN:  no visible rashes or open areas   PSYCH:  oriented X 2-3, insight and memory impaired, affect and mood normal    XR 5/16/2025:  Lumbar impressions:  1. Examination is limited in that the lateral view was unable to be obtained. Mild lumbar levoscoliosis (10 degrees Love's angle at L3 level)  2. No compression deformities or fractures demonstrated radiographically. If there is persistent pain, follow up CT or MRI may be obtained as clinically warranted.   3. Mild degree of osteopenia  4. Mild spondylosis demonstrated. "     Thoracic impressions:  1. Mild dextroscoliosis with no subluxation. (13 degrees Love's angle at T10 level.)  2. No compression deformities or fractures demonstrated radiographically. If there is persistent pain follow up CT or MRI may be obtained as clinically warranted.   3. Mild degree of osteopenia  4. Mild spondylosis.         Recent labs in [facility] reviewed by me today.       ASSESSMENT / PLAN:  (M54.6) Acute midline thoracic back pain  (primary encounter diagnosis)  (M51.361) Degeneration of intervertebral disc of lumbar region with lower extremity pain  (M15.9) Osteoarthritis of multiple joints, unspecified osteoarthritis type  Comment: XR negative for fracture. She agrees to trial of lidocaine patch   Plan: lidocaine patch, 2 patches to mid-low back for 12 hrs daily. Continue tramadol 25 mg bid and bid prn, tylenol 1000 mg bid and daily prn. Walker and wheelchair for mobility. Refer to [facility] for home therapies.     (M17.0) Primary osteoarthritis of both knees  Comment: knee pain improved after steroid injection 3/12/2025   Plan: tylenol and tramadol as above, diclofenac gel tid. Repeat steroid injection every 3 months     (Z74.09,  Z78.9) Impaired mobility and activities of daily living  Comment: severe osteoarthritis with declining functional status, needing more assistance with cares. No longer able to transfer in and out of bed on her own. She would benefit from a hospital bed to facilitate transfers and alleviate back pain   Plan: refer to [facility] PHYSICAL THERAPY/OT for transfer and gait training, strengthening, ADL safety and cognitive testing. Documentation completed for hospital bed.     (N18.32) Stage 3b chronic kidney disease (H)  Comment: creatinine 1.41 on 3/20/2025, baseline   Plan: encourage hydration. Avoid nephrotoxins, renal dose meds     (J47.9) Bronchiectasis without acute exacerbation (H)  Comment: no acute issues   Plan: continue mucinex 600 mg bid, albuterol prn, guaifenesin  prn    (R46.89) Cognitive impairment  Comment: declining cognition and functional status. No signs of an acute neurological event or illness   Plan: cognitive testing per OT. AL staff assistance with cares, meals, activities, med admin. Asked staff to follow up with family re: increasing care needs.             Electronically signed by: FER Muniz CNP                             Face to Face and Medical Necessity Statement for DME Provider visit    Demographic Information on Grace Vega:  Gender: female  : 3/15/1929  [facility]  [address]  [place] [facility] [address]  [phone] (home)     Medical Record: [id number]  Social Security Number: [id number]  Primary Care Provider: Jacinto Skinner  Insurance: Payor: [facility] / Plan: [employer] MEDICARE / Product Type: HMO /     HPI:   Grace Vega is a 89 year old  (3/15/1929), who is being seen today for a face to face provider visit at [facility] ; medical necessity statement for DME included. This patient requires the following:  DME Ordered and Medical Necessity Statement   Semi electric hospital bed with half side rails   The patient does  require positioning of the body in ways not feasible with an ordinary bed due to a medical condition that is expected to last at least 1 month due to DDD lumbar spine, back pain, osteoarthritis .   The patient does  require, for the alleviation of pain, postioning of the body in ways not feasible with an ordinary bed.   The patient does  require the head of bed elevated more than 30* most of the time due to chronic pulmonary disease  The patient does not require traction that can only be attached to a hospital bed.  The patient does  require a bed height different than a fixed height hospital bed to permit tranfers to wheelchair or standing position.   The patient does  require frequent or immediate changes in body position due to osteoarthritis, DDD, back and knee  pain.     Patient requires frequent body repositioning not feasible in an ordinary bed to alleviate pain and requires head of bed to be elevated more than 30 degrees due to bronchiectasis and DDD, as well as to facilitate transfers from bed to chair.       Pt needing above DME with expected length of need of 99  due to medical necessity associated with following diagnosis:     Acute midline thoracic back pain  Degeneration of intervertebral disc of lumbar region with lower extremity pain  Osteoarthritis of multiple joints, unspecified osteoarthritis type  Primary osteoarthritis of both knees  Impaired mobility and activities of daily living  Stage 3b chronic kidney disease (H)  Bronchiectasis without acute exacerbation (H)  Cognitive impairment      PMH   has a past medical history of Acquired hypothyroidism, Bilateral leg edema, Bronchiectasis (H), DDD (degenerative disc disease), lumbar, Deep venous thrombosis (DVT) of left peroneal vein (H) (09/2021), History of basal cell carcinoma, Lumbar radiculopathy, Macular degeneration (senile) of retina, Osteopenia, Pseudophakia, Sensorineural hearing loss, bilateral, Tinnitus, bilateral, and Vertigo.    ROS:4 point ROS including Respiratory, CV, GI and , other than that noted in the HPI,  is negative    EXAM  Vitals: /83   Pulse 78   Temp 97.8  F (36.6  C)   Resp 16   Wt 62.4 kg (137 lb 9.6 oz)   BMI 25.17 kg/m  ;BMI= Body mass index is 25.17 kg/m .   GENERAL APPEARANCE:  Alert, in no distress  ENT:  [place]   EYES:  conjunctiva and lids normal  RESP:  no respiratory distress   CV:  no edema   ABDOMEN:  soft, non-tender, no distension   M/S: wheelchair.  LOPEZ. Mild tenderness to palpation over mid to low back.  Degenerative changes both knees, no acute inflammation   SKIN:  no visible rashes or open areas   PSYCH:  oriented X 2-3, insight and memory impaired, affect and mood normal    ASSESSMENT/PLAN:  1. Acute midline thoracic back pain    2. Degeneration of  intervertebral disc of lumbar region with lower extremity pain    3. Osteoarthritis of multiple joints, unspecified osteoarthritis type    4. Primary osteoarthritis of both knees    5. Impaired mobility and activities of daily living    6. Stage 3b chronic kidney disease (H)    7. Bronchiectasis without acute exacerbation (H)    8. Cognitive impairment          ELECTRONICALLY SIGNED BY BENJI CERTIFIED PROVIDER:  FER Muniz CNP   NPI: [id number]  [facility]  [address] [place] [address]    Sincerely,        FER Nicholson CNP    Electronically signed

## 2025-05-19 NOTE — PROGRESS NOTES
"[facility] GERIATRICS    Chief Complaint   Patient presents with    RECHECK       HPI:  Grace Vega is a 89 year old  (3/15/1929), who is being seen today for an episodic care visit at: [facility] [[id number]].   She and her  moved to this facility 2022 to be closer to family and for a higher level of care. Previously lived in an apartment in [place] and received care through [facility]. Her   2025.   Medical history significant for DDD lumbar with radiculopathy, gait instability, osteoarthritis, chronic knee pain, hypothyroidism, bronchiectasis, CKD stage 3, LE edema, macular degeneration, cognitive impairment, left peroneal vein DVT 3/2021.   She was seen in the ED 2022 for a mechanical fall with head strike. She slipped on the wet floor while doing laundry. CT head showed a right frontal scalp hematoma,mild to moderate diffuse cerebral atrophy and mild small vessel ischemic disease, no acute intracranial pathology. CT cervical spine showed multilevel degenerative changes and no acute fracture or subluxation.   She had COVID-19 2023 and was treated with Paxlovid.   She had left base pneumonia on CXR 2024, treated with Augmentin.       Today's concern is:      Acute midline thoracic back pain  Degeneration of intervertebral disc of lumbar region with lower extremity pain  Osteoarthritis of multiple joints, unspecified osteoarthritis type  Primary osteoarthritis of both knees  Impaired mobility and activities of daily living  Stage 3b chronic kidney disease (H)  Bronchiectasis without acute exacerbation (H)  Cognitive impairment  She is seen for follow up of multiple medical issues, as well as acute back pain. XR done 2025 showed degenerative changes, no fractures. Tramadol was increased 5/15/2025. She is a fair historian. Reports pain remains severe with movement, \"sharp, grabbing pain\" across her mid to low back. " She's having trouble lifting her legs to transfer in and out of bed and reports she slept sideways with her feet on the floor last night. Denies fall or injury. She's otherwise feeling well. Denies respiratory symptoms. Good appetite. Able to ambulate within the apartment with her walker,  but is using her wheelchair today.  Receives assistance with bathing and dressing. She has been independent with toileting and pm cares.   Spoke with her stepson Olayinka Vega, who reports family has noticed a more significant decline in cognition over the past few weeks.   Upon chart review, tramadol was increased 5/15/2025, but this has not been implemented-asked nurse to follow up with pharmacy.       Allergies, and PMH/PSH reviewed in EPIC today    REVIEW OF SYSTEMS:  4 point ROS including Respiratory, CV, GI and , other than that noted in the HPI,  is negative    Objective:   /83   Pulse 78   Temp 97.8  F (36.6  C)   Resp 16   Wt 62.4 kg (137 lb 9.6 oz)   BMI 25.17 kg/m    GENERAL APPEARANCE:  Alert, in no distress  ENT:  [place]   EYES:  conjunctiva and lids normal  RESP:  no respiratory distress   CV:  no edema   ABDOMEN:  soft, non-tender, no distension   M/S: wheelchair.  LOPEZ. Mild tenderness to palpation over mid to low back.  Degenerative changes both knees, no acute inflammation   SKIN:  no visible rashes or open areas   PSYCH:  oriented X 2-3, insight and memory impaired, affect and mood normal    XR 5/16/2025:  Lumbar impressions:  1. Examination is limited in that the lateral view was unable to be obtained. Mild lumbar levoscoliosis (10 degrees Love's angle at L3 level)  2. No compression deformities or fractures demonstrated radiographically. If there is persistent pain, follow up CT or MRI may be obtained as clinically warranted.   3. Mild degree of osteopenia  4. Mild spondylosis demonstrated.     Thoracic impressions:  1. Mild dextroscoliosis with no subluxation. (13 degrees Love's angle at T10  level.)  2. No compression deformities or fractures demonstrated radiographically. If there is persistent pain follow up CT or MRI may be obtained as clinically warranted.   3. Mild degree of osteopenia  4. Mild spondylosis.         Recent labs in [facility] reviewed by me today.       ASSESSMENT / PLAN:  (M54.6) Acute midline thoracic back pain  (primary encounter diagnosis)  (M51.361) Degeneration of intervertebral disc of lumbar region with lower extremity pain  (M15.9) Osteoarthritis of multiple joints, unspecified osteoarthritis type  Comment: XR negative for fracture. She agrees to trial of lidocaine patch   Plan: lidocaine patch, 2 patches to mid-low back for 12 hrs daily. Continue tramadol 25 mg bid and bid prn, tylenol 1000 mg bid and daily prn. Walker and wheelchair for mobility. Refer to [facility] for home therapies.     (M17.0) Primary osteoarthritis of both knees  Comment: knee pain improved after steroid injection 3/12/2025   Plan: tylenol and tramadol as above, diclofenac gel tid. Repeat steroid injection every 3 months     (Z74.09,  Z78.9) Impaired mobility and activities of daily living  Comment: severe osteoarthritis with declining functional status, needing more assistance with cares. No longer able to transfer in and out of bed on her own. She would benefit from a hospital bed to facilitate transfers and alleviate back pain   Plan: refer to [facility] PHYSICAL THERAPY/OT for transfer and gait training, strengthening, ADL safety and cognitive testing. Documentation completed for hospital bed.     (N18.32) Stage 3b chronic kidney disease (H)  Comment: creatinine 1.41 on 3/20/2025, baseline   Plan: encourage hydration. Avoid nephrotoxins, renal dose meds     (J47.9) Bronchiectasis without acute exacerbation (H)  Comment: no acute issues   Plan: continue mucinex 600 mg bid, albuterol prn, guaifenesin prn    (R41.89) Cognitive impairment  Comment: declining cognition and functional status. No signs of an  acute neurological event or illness   Plan: cognitive testing per OT. AL staff assistance with cares, meals, activities, med admin. Asked staff to follow up with family re: increasing care needs.             Electronically signed by: FER Muniz CNP

## 2025-05-19 NOTE — PROGRESS NOTES
Face to Face and Medical Necessity Statement for DME Provider visit    Demographic Information on Grace Vega:  Gender: female  : 3/15/1929  [facility]  [address]  [address]  [facility] [address]  [phone] (home)     Medical Record: [id number]  Social Security Number: [id number]  Primary Care Provider: Jacinto Skinner  Insurance: Payor: [facility] / Plan: [facility] MEDICARE / Product Type: HMO /     HPI:   Grace Vega is a 89 year old  (3/15/1929), who is being seen today for a face to face provider visit at [facility] ; medical necessity statement for DME included. This patient requires the following:  DME Ordered and Medical Necessity Statement   Semi electric hospital bed with half side rails   The patient does  require positioning of the body in ways not feasible with an ordinary bed due to a medical condition that is expected to last at least 1 month due to DDD lumbar spine, back pain, osteoarthritis .   The patient does  require, for the alleviation of pain, postioning of the body in ways not feasible with an ordinary bed.   The patient does  require the head of bed elevated more than 30* most of the time due to chronic pulmonary disease  The patient does not require traction that can only be attached to a hospital bed.  The patient does  require a bed height different than a fixed height hospital bed to permit tranfers to wheelchair or standing position.   The patient does  require frequent or immediate changes in body position due to osteoarthritis, DDD, back and knee pain.     Patient requires frequent body repositioning not feasible in an ordinary bed to alleviate pain and requires head of bed to be elevated more than 30 degrees due to bronchiectasis and DDD, as well as to facilitate transfers from bed to chair.       Pt needing above DME with expected length of need of 99  due to medical necessity associated with following diagnosis:     Acute  midline thoracic back pain  Degeneration of intervertebral disc of lumbar region with lower extremity pain  Osteoarthritis of multiple joints, unspecified osteoarthritis type  Primary osteoarthritis of both knees  Impaired mobility and activities of daily living  Stage 3b chronic kidney disease (H)  Bronchiectasis without acute exacerbation (H)  Cognitive impairment      PMH   has a past medical history of Acquired hypothyroidism, Bilateral leg edema, Bronchiectasis (H), DDD (degenerative disc disease), lumbar, Deep venous thrombosis (DVT) of left peroneal vein (H) (09/2021), History of basal cell carcinoma, Lumbar radiculopathy, Macular degeneration (senile) of retina, Osteopenia, Pseudophakia, Sensorineural hearing loss, bilateral, Tinnitus, bilateral, and Vertigo.    ROS:4 point ROS including Respiratory, CV, GI and , other than that noted in the HPI,  is negative    EXAM  Vitals: /83   Pulse 78   Temp 97.8  F (36.6  C)   Resp 16   Wt 62.4 kg (137 lb 9.6 oz)   BMI 25.17 kg/m  ;BMI= Body mass index is 25.17 kg/m .   GENERAL APPEARANCE:  Alert, in no distress  ENT:  [place]   EYES:  conjunctiva and lids normal  RESP:  no respiratory distress   CV:  no edema   ABDOMEN:  soft, non-tender, no distension   M/S: wheelchair.  LOPEZ. Mild tenderness to palpation over mid to low back.  Degenerative changes both knees, no acute inflammation   SKIN:  no visible rashes or open areas   PSYCH:  oriented X 2-3, insight and memory impaired, affect and mood normal    ASSESSMENT/PLAN:  1. Acute midline thoracic back pain    2. Degeneration of intervertebral disc of lumbar region with lower extremity pain    3. Osteoarthritis of multiple joints, unspecified osteoarthritis type    4. Primary osteoarthritis of both knees    5. Impaired mobility and activities of daily living    6. Stage 3b chronic kidney disease (H)    7. Bronchiectasis without acute exacerbation (H)    8. Cognitive impairment          ELECTRONICALLY SIGNED BY  BENJI CERTIFIED PROVIDER:  FER Muniz CNP   NPI: [id number]  [facility]  [address] [place] [id number]

## 2025-06-02 ENCOUNTER — ASSISTED LIVING VISIT (OUTPATIENT)
Dept: GERIATRICS | Facility: CLINIC | Age: OVER 89
End: 2025-06-02
Payer: COMMERCIAL

## 2025-06-02 VITALS
WEIGHT: 137.6 LBS | HEART RATE: 78 BPM | RESPIRATION RATE: 16 BRPM | DIASTOLIC BLOOD PRESSURE: 83 MMHG | BODY MASS INDEX: 25.17 KG/M2 | TEMPERATURE: 97.8 F | SYSTOLIC BLOOD PRESSURE: 131 MMHG

## 2025-06-02 DIAGNOSIS — R41.89 COGNITIVE IMPAIRMENT: ICD-10-CM

## 2025-06-02 DIAGNOSIS — Z78.9 IMPAIRED MOBILITY AND ACTIVITIES OF DAILY LIVING: ICD-10-CM

## 2025-06-02 DIAGNOSIS — Z74.09 IMPAIRED MOBILITY AND ACTIVITIES OF DAILY LIVING: ICD-10-CM

## 2025-06-02 DIAGNOSIS — M15.9 OSTEOARTHRITIS OF MULTIPLE JOINTS, UNSPECIFIED OSTEOARTHRITIS TYPE: ICD-10-CM

## 2025-06-02 DIAGNOSIS — M51.361 DEGENERATION OF INTERVERTEBRAL DISC OF LUMBAR REGION WITH LOWER EXTREMITY PAIN: ICD-10-CM

## 2025-06-02 DIAGNOSIS — J47.9 BRONCHIECTASIS WITHOUT ACUTE EXACERBATION (H): ICD-10-CM

## 2025-06-02 DIAGNOSIS — M54.6 ACUTE MIDLINE THORACIC BACK PAIN: Primary | ICD-10-CM

## 2025-06-02 PROCEDURE — 99349 HOME/RES VST EST MOD MDM 40: CPT | Performed by: NURSE PRACTITIONER

## 2025-06-02 NOTE — LETTER
" 2025      Grace Vega  [facility]  [address]  [facility] [address]          [facility] GERIATRICS    Chief Complaint   Patient presents with     RECHECK       HPI:  Grace Vega is a 89 year old  (3/15/1929), who is being seen today for an episodic care visit at: THE [facility] ([facility]) [[id number]].   She and her  moved to this facility 2022 to be closer to family and for a higher level of care. Previously lived in an apartment in [place] and received care through [facility]. Her   2025.   Medical history significant for DDD lumbar with radiculopathy, gait instability, osteoarthritis, chronic knee pain, hypothyroidism, bronchiectasis, CKD stage 3, LE edema, macular degeneration, cognitive impairment, left peroneal vein DVT 3/2021.   She was seen in the ED 2022 for a mechanical fall with head strike. She slipped on the wet floor while doing laundry. CT head showed a right frontal scalp hematoma,mild to moderate diffuse cerebral atrophy and mild small vessel ischemic disease, no acute intracranial pathology. CT cervical spine showed multilevel degenerative changes and no acute fracture or subluxation.   She had COVID-19 2023 and was treated with Paxlovid.   She had left base pneumonia on CXR 2024, treated with Augmentin.       Today's concern is:      Acute midline thoracic back pain  Degeneration of intervertebral disc of lumbar region with lower extremity pain  Osteoarthritis of multiple joints, unspecified osteoarthritis type  Impaired mobility and activities of daily living  Bronchiectasis without acute exacerbation (H)  Cognitive impairment  She is seen for follow up of acute back pain. She's a fair historian, conversation is somewhat repetitive. No fall or injury. XR 2025 showed degenerative changes, no fractures. She reports the pain has improved, but remains \"sharp\" at times. Reports " "lidocaine patches have been helpful. Transferring in and out of bed remains difficult, and she's requiring more assistance from staff. Working with home therapies and is frustrated that she can't do more. Able to ambulate short distances in the apartment with her walker and assist, but has become primarily wheelchair bound. Reports chronic knee pain is \"the same.\" No numbness or weakness of her LE. Staff reports no new issues.       Allergies, and PMH/PSH reviewed in EPIC today    REVIEW OF SYSTEMS:  4 point ROS including Respiratory, CV, GI and , other than that noted in the HPI,  is negative    Objective:   /83   Pulse 78   Temp 97.8  F (36.6  C)   Resp 16   Wt 62.4 kg (137 lb 9.6 oz)   BMI 25.17 kg/m    GENERAL APPEARANCE:  Alert, in no distress  ENT:  [place]   EYES:  conjunctiva and lids normal  RESP:  lungs clear bilaterally   CV:  regular rate and rhythm, no murmur, no edema   ABDOMEN:  soft, non-tender, no distension   M/S: wheelchair.  LOPEZ. Mild tenderness to palpation over mid spine.  Degenerative changes both knees, no acute inflammation   SKIN:  no  rashes or open areas   PSYCH:  oriented X 2-3, insight and memory impaired, affect and mood normal      XR 5/16/2025:  Lumbar impressions:  1. Examination is limited in that the lateral view was unable to be obtained. Mild lumbar levoscoliosis (10 degrees Love's angle at L3 level)  2. No compression deformities or fractures demonstrated radiographically. If there is persistent pain, follow up CT or MRI may be obtained as clinically warranted.   3. Mild degree of osteopenia  4. Mild spondylosis demonstrated.     Thoracic impressions:  1. Mild dextroscoliosis with no subluxation. (13 degrees Love's angle at T10 level.)  2. No compression deformities or fractures demonstrated radiographically. If there is persistent pain follow up CT or MRI may be obtained as clinically warranted.   3. Mild degree of osteopenia  4. Mild spondylosis.       Recent labs in " EPIC reviewed by me today.       ASSESSMENT / PLAN:  (M54.6) Acute midline thoracic back pain  (primary encounter diagnosis)  (M51.361) Degeneration of intervertebral disc of lumbar region with lower extremity pain  Comment: acute back pain slowly improving   Plan: continue lidocaine patches, tramadol 25 mg bid and bid prn, tylenol 1000 mg bid and daily prn. Continue PHYSICAL THERAPY/OT with [facility]    (M15.9) Osteoarthritis of multiple joints, unspecified osteoarthritis type  Comment: good relief of chronic bilateral knee pain after injection 3/12/2025  Plan: pain meds as above. Refer to Solange Carbajal NP for steroid injection this month     (Z74.09,  Z78.9) Impaired mobility and activities of daily living  Comment: less mobile and requiring more assistance with transfers and cares   Plan: documentation for hospital bed was completed 5/19/2025. Wheelchair and walker with assist for mobility. Continue home therapies.     (J47.9) Bronchiectasis without acute exacerbation (H)  Comment: no acute issues   Plan: continue mucinex 600 mg bid,  albuterol prn, guaifenesin liquid prn     (R41.89) Cognitive impairment  Comment: gradual decline in both cognition over the past year, more significant in recent months   Plan: cognitive testing per home therapies has been requested. AL staff assistance with cares, meals, activities, med admin          Electronically signed by: FER Muniz CNP                             Sincerely,        FER Nicholson CNP    Electronically signed

## 2025-06-02 NOTE — PROGRESS NOTES
"[facility] GERIATRICS    Chief Complaint   Patient presents with    RECHECK       HPI:  Grace Vega is a 89 year old  (3/15/1929), who is being seen today for an episodic care visit at: THE [facility] ([facility]) [[id number]].   She and her  moved to this facility 2022 to be closer to family and for a higher level of care. Previously lived in an apartment in [place] and received care through [facility]. Her   2025.   Medical history significant for DDD lumbar with radiculopathy, gait instability, osteoarthritis, chronic knee pain, hypothyroidism, bronchiectasis, CKD stage 3, LE edema, macular degeneration, cognitive impairment, left peroneal vein DVT 3/2021.   She was seen in the ED 2022 for a mechanical fall with head strike. She slipped on the wet floor while doing laundry. CT head showed a right frontal scalp hematoma,mild to moderate diffuse cerebral atrophy and mild small vessel ischemic disease, no acute intracranial pathology. CT cervical spine showed multilevel degenerative changes and no acute fracture or subluxation.   She had COVID-19 2023 and was treated with Paxlovid.   She had left base pneumonia on CXR 2024, treated with Augmentin.       Today's concern is:      Acute midline thoracic back pain  Degeneration of intervertebral disc of lumbar region with lower extremity pain  Osteoarthritis of multiple joints, unspecified osteoarthritis type  Impaired mobility and activities of daily living  Bronchiectasis without acute exacerbation (H)  Cognitive impairment  She is seen for follow up of acute back pain. She's a fair historian, conversation is somewhat repetitive. No fall or injury. XR 2025 showed degenerative changes, no fractures. She reports the pain has improved, but remains \"sharp\" at times. Reports lidocaine patches have been helpful. Transferring in and out of bed remains difficult, and she's requiring more " "assistance from staff. Working with home therapies and is frustrated that she can't do more. Able to ambulate short distances in the apartment with her walker and assist, but has become primarily wheelchair bound. Reports chronic knee pain is \"the same.\" No numbness or weakness of her LE. Staff reports no new issues.       Allergies, and PMH/PSH reviewed in EPIC today    REVIEW OF SYSTEMS:  4 point ROS including Respiratory, CV, GI and , other than that noted in the HPI,  is negative    Objective:   /83   Pulse 78   Temp 97.8  F (36.6  C)   Resp 16   Wt 62.4 kg (137 lb 9.6 oz)   BMI 25.17 kg/m    GENERAL APPEARANCE:  Alert, in no distress  ENT:  [place]   EYES:  conjunctiva and lids normal  RESP:  lungs clear bilaterally   CV:  regular rate and rhythm, no murmur, no edema   ABDOMEN:  soft, non-tender, no distension   M/S: wheelchair.  LOPEZ. Mild tenderness to palpation over mid spine.  Degenerative changes both knees, no acute inflammation   SKIN:  no  rashes or open areas   PSYCH:  oriented X 2-3, insight and memory impaired, affect and mood normal      XR 5/16/2025:  Lumbar impressions:  1. Examination is limited in that the lateral view was unable to be obtained. Mild lumbar levoscoliosis (10 degrees Love's angle at L3 level)  2. No compression deformities or fractures demonstrated radiographically. If there is persistent pain, follow up CT or MRI may be obtained as clinically warranted.   3. Mild degree of osteopenia  4. Mild spondylosis demonstrated.     Thoracic impressions:  1. Mild dextroscoliosis with no subluxation. (13 degrees Love's angle at T10 level.)  2. No compression deformities or fractures demonstrated radiographically. If there is persistent pain follow up CT or MRI may be obtained as clinically warranted.   3. Mild degree of osteopenia  4. Mild spondylosis.       Recent labs in [facility] reviewed by me today.       ASSESSMENT / PLAN:  (M54.6) Acute midline thoracic back pain  (primary " encounter diagnosis)  (M51.361) Degeneration of intervertebral disc of lumbar region with lower extremity pain  Comment: acute back pain slowly improving   Plan: continue lidocaine patches, tramadol 25 mg bid and bid prn, tylenol 1000 mg bid and daily prn. Continue PHYSICAL THERAPY/OT with [facility]    (M15.9) Osteoarthritis of multiple joints, unspecified osteoarthritis type  Comment: good relief of chronic bilateral knee pain after injection 3/12/2025  Plan: pain meds as above. Refer to Solange Carbajal NP for steroid injection this month     (Z74.09,  Z78.9) Impaired mobility and activities of daily living  Comment: less mobile and requiring more assistance with transfers and cares   Plan: documentation for hospital bed was completed 5/19/2025. Wheelchair and walker with assist for mobility. Continue home therapies.     (J47.9) Bronchiectasis without acute exacerbation (H)  Comment: no acute issues   Plan: continue mucinex 600 mg bid,  albuterol prn, guaifenesin liquid prn     (R41.89) Cognitive impairment  Comment: gradual decline in both cognition over the past year, more significant in recent months   Plan: cognitive testing per home therapies has been requested. AL staff assistance with cares, meals, activities, med admin          Electronically signed by: FER Muniz CNP

## 2025-06-17 ENCOUNTER — ASSISTED LIVING VISIT (OUTPATIENT)
Dept: GERIATRICS | Facility: CLINIC | Age: OVER 89
End: 2025-06-17
Payer: COMMERCIAL

## 2025-06-17 DIAGNOSIS — G89.29 BILATERAL CHRONIC KNEE PAIN: ICD-10-CM

## 2025-06-17 DIAGNOSIS — M25.561 BILATERAL CHRONIC KNEE PAIN: ICD-10-CM

## 2025-06-17 DIAGNOSIS — M25.562 BILATERAL CHRONIC KNEE PAIN: ICD-10-CM

## 2025-06-17 DIAGNOSIS — M15.9 OSTEOARTHRITIS OF MULTIPLE JOINTS, UNSPECIFIED OSTEOARTHRITIS TYPE: Primary | ICD-10-CM

## 2025-06-17 PROCEDURE — 20610 DRAIN/INJ JOINT/BURSA W/O US: CPT | Mod: 50 | Performed by: NURSE PRACTITIONER

## 2025-06-17 NOTE — LETTER
6/17/2025      Grace Vega  [facility]  [address]  [facility] [address]        [facility] GERIATRICS    Chief Complaint   Patient presents with     RECHECK     Ortho     HPI:  Grace Vega is a 89 year old  (3/15/1929), who is being seen today for an episodic care visit at: THE [facility] ([facility]) [[id number]].     Today's Visit:  Grace is seen today for repeat bilateral knee injections.  Her L knee continues to be more painful than the right.  Also notes L lateral thigh pain.  She denies recent falls.  Still mourning the death of her  earlier this year, reports loss of appetite.  Was tearful at one point when reminiscing on his passing as well as current events in the news causing her distress.  She is not sure how long the cortisone injections provide her relief, but still feels they are beneficial and wants to continue to have them done q3m.  She has been receiving injections q3m since 2022.  Last injection 3/13/25.    Allergies, and PMH/PSH reviewed in EPIC today.  REVIEW OF SYSTEMS:  As noted in HPI    Objective:   There were no vitals taken for this visit.  GENERAL APPEARANCE:  Alert, in no distress, cooperative  M/S:    Bilateral knees- skin intact, no joint effusion, valgus deformity.  Tenderness with palpation of medial joint space on L knee.  Diminished ROM: flexion 110 with extension lacking 5 degrees bilaterally.  Crepitus felt.    PSYCH:  oriented to person, place, memory impaired         Assessment/Plan:  (M15.9) Osteoarthritis of multiple joints, unspecified osteoarthritis type  (primary encounter diagnosis)  (M25.561,  M25.562,  G89.29) Bilateral chronic knee pain  Acute on chronic, ongoing.  Managing pain daily with acetaminophen 1000mg BID and 1000mg BID PRN, diclofenac gel TID, tramadol 25mg BID and BID PRN pain.  Mostly mobilizes with wheelchair given falls risk and deconditioned state.  Wants to proceed with the knee injections  today.  Reviewed onset of pain relief anticipated within 1-2 weeks post injection.  Plan:   -Continue daily pain management with acetaminophen, tramadol, diclofenac gel  -Bilateral knee injections today  -Plan to repeat injections in September        Procedure:  After risks, benefits and complications of steroid injection were discussed with the patient she elected to proceed.  Using sterile technique, the area was first prepped with betadyne and an alcohol swab.  A 22 gauge needle was used to inject 40 mg DepoMedrol and 4 cc of 1% lidocaine into the knee on the right and left.  Grace  tolerated the procedure well without complications.    Note:  Injections were done by [employer] NP student, Yoanna Price, under supervision and guidance of writer.      Electronically signed by: FER Cota CNP           Sincerely,        FER Cota CNP    Electronically signed

## 2025-06-17 NOTE — PROGRESS NOTES
[facility] GERIATRICS    Chief Complaint   Patient presents with    RECHECK     Ortho     HPI:  Grace Vega is a 89 year old  (3/15/1929), who is being seen today for an episodic care visit at: [facility]) [[id number]].     Today's Visit:  Grace is seen today for repeat bilateral knee injections.  Her L knee continues to be more painful than the right.  Also notes L lateral thigh pain.  She denies recent falls.  Still mourning the death of her  earlier this year, reports loss of appetite.  Was tearful at one point when reminiscing on his passing as well as current events in the news causing her distress.  She is not sure how long the cortisone injections provide her relief, but still feels they are beneficial and wants to continue to have them done q3m.  She has been receiving injections q3m since 2022.  Last injection 3/13/25.    Allergies, and PMH/PSH reviewed in EPIC today.  REVIEW OF SYSTEMS:  As noted in HPI    Objective:   There were no vitals taken for this visit.  GENERAL APPEARANCE:  Alert, in no distress, cooperative  M/S:    Bilateral knees- skin intact, no joint effusion, valgus deformity.  Tenderness with palpation of medial joint space on L knee.  Diminished ROM: flexion 110 with extension lacking 5 degrees bilaterally.  Crepitus felt.    PSYCH:  oriented to person, place, memory impaired         Assessment/Plan:  (M15.9) Osteoarthritis of multiple joints, unspecified osteoarthritis type  (primary encounter diagnosis)  (M25.561,  M25.562,  G89.29) Bilateral chronic knee pain  Acute on chronic, ongoing.  Managing pain daily with acetaminophen 1000mg BID and 1000mg BID PRN, diclofenac gel TID, tramadol 25mg BID and BID PRN pain.  Mostly mobilizes with wheelchair given falls risk and deconditioned state.  Wants to proceed with the knee injections today.  Reviewed onset of pain relief anticipated within 1-2 weeks post injection.  Plan:   -Continue daily pain  management with acetaminophen, tramadol, diclofenac gel  -Bilateral knee injections today  -Plan to repeat injections in September        Procedure:  After risks, benefits and complications of steroid injection were discussed with the patient she elected to proceed.  Using sterile technique, the area was first prepped with betadyne and an alcohol swab.  A 22 gauge needle was used to inject 40 mg DepoMedrol and 4 cc of 1% lidocaine into the knee on the right and left.  Grace  tolerated the procedure well without complications.    Note:  Injections were done by [employer] NP student, Yoanna Price, under supervision and guidance of writer.      Electronically signed by: FER Cota CNP

## 2025-06-19 ENCOUNTER — ASSISTED LIVING VISIT (OUTPATIENT)
Dept: GERIATRICS | Facility: CLINIC | Age: OVER 89
End: 2025-06-19
Payer: COMMERCIAL

## 2025-06-19 VITALS
HEART RATE: 86 BPM | BODY MASS INDEX: 25.61 KG/M2 | WEIGHT: 140 LBS | TEMPERATURE: 98.2 F | DIASTOLIC BLOOD PRESSURE: 88 MMHG | RESPIRATION RATE: 20 BRPM | SYSTOLIC BLOOD PRESSURE: 145 MMHG

## 2025-06-19 DIAGNOSIS — M15.9 OSTEOARTHRITIS OF MULTIPLE JOINTS, UNSPECIFIED OSTEOARTHRITIS TYPE: ICD-10-CM

## 2025-06-19 DIAGNOSIS — F43.21 GRIEF: ICD-10-CM

## 2025-06-19 DIAGNOSIS — M25.562 BILATERAL CHRONIC KNEE PAIN: ICD-10-CM

## 2025-06-19 DIAGNOSIS — G62.9 NEUROPATHY: ICD-10-CM

## 2025-06-19 DIAGNOSIS — R41.89 COGNITIVE IMPAIRMENT: ICD-10-CM

## 2025-06-19 DIAGNOSIS — N18.32 STAGE 3B CHRONIC KIDNEY DISEASE (H): ICD-10-CM

## 2025-06-19 DIAGNOSIS — M25.561 BILATERAL CHRONIC KNEE PAIN: ICD-10-CM

## 2025-06-19 DIAGNOSIS — M51.361 DEGENERATION OF INTERVERTEBRAL DISC OF LUMBAR REGION WITH LOWER EXTREMITY PAIN: ICD-10-CM

## 2025-06-19 DIAGNOSIS — G89.29 BILATERAL CHRONIC KNEE PAIN: ICD-10-CM

## 2025-06-19 PROCEDURE — 99349 HOME/RES VST EST MOD MDM 40: CPT | Performed by: NURSE PRACTITIONER

## 2025-06-19 RX ORDER — GABAPENTIN 100 MG/1
100 CAPSULE ORAL AT BEDTIME
Qty: 30 CAPSULE | Refills: 11 | Status: SHIPPED | OUTPATIENT
Start: 2025-06-19

## 2025-06-19 NOTE — PROGRESS NOTES
[facility] GERIATRICS    Chief Complaint   Patient presents with    RECHECK     HPI:  Grace Vega is a 89 year old  (3/15/1929), who is being seen today for an episodic care visit at: [facility]) [[id number]]. Today's concern is: ***    Allergies, and PMH/PSH reviewed in EPIC today.  REVIEW OF SYSTEMS:  {[id number]:192638}    Objective:   BP (!) 145/88   Pulse 86   Temp 98.2  F (36.8  C)   Resp 20   Wt 63.5 kg (140 lb)   BMI 25.61 kg/m    {Nursing home physical exam :109286}    {fgslab:919890}    Assessment/Plan:  {S DX2:921980}    MED REC REQUIRED{TIP  Click the link below to document or use med rec list, use list to pull in response :574869}  Post Medication Reconciliation Status: {MED REC LIST:582184}      Orders:  {fgsorders:239079}  ***    Electronically signed by: Cici Osborne ***       hospital bed has helped with pain and transfers. Reports knee pain improved after recent steroid injections. Continues to work with home PHYSICAL THERAPY/OT. Ambulates short distances with walker and assist, wheelchair at other times. Requires assist of 1 with cares.       Allergies, and PMH/PSH reviewed in EPIC today    REVIEW OF SYSTEMS:  4 point ROS including Respiratory, CV, GI and , other than that noted in the HPI,  is negative    Objective:   BP (!) 145/88   Pulse 86   Temp 98.2  F (36.8  C)   Resp 20   Wt 63.5 kg (140 lb)   BMI 25.61 kg/m    GENERAL APPEARANCE:  Alert, in no distress  ENT:  [place]   EYES:  conjunctiva and lids normal  RESP:  lungs clear bilaterally   CV:  regular rate and rhythm, no murmur, no edema   ABDOMEN:  soft, non-tender, no distension   M/S: wheelchair.  LOPEZ. Mild tenderness to palpation over mid spine.  Degenerative changes both knees, no acute inflammation   SKIN:  no  rashes or open areas. Skin of both heels intact, no erythema.   PSYCH:  oriented X 2-3, insight and memory impaired, affect and mood normal      XR 5/16/2025:  Lumbar impressions:  1. Examination is limited in that the lateral view was unable to be obtained. Mild lumbar levoscoliosis (10 degrees Love's angle at L3 level)  2. No compression deformities or fractures demonstrated radiographically. If there is persistent pain, follow up CT or MRI may be obtained as clinically warranted.   3. Mild degree of osteopenia  4. Mild spondylosis demonstrated.     Thoracic impressions:  1. Mild dextroscoliosis with no subluxation. (13 degrees Love's angle at T10 level.)  2. No compression deformities or fractures demonstrated radiographically. If there is persistent pain follow up CT or MRI may be obtained as clinically warranted.   3. Mild degree of osteopenia  4. Mild spondylosis.       Recent labs in [facility] reviewed by me today.       ASSESSMENT / PLAN:  (G62.9) Neuropathy  (primary encounter diagnosis)  Comment: affecting  both feet, likely related to DDD of lumbar spine.   Spoke with her niurkaon Olayinka Vega and reviewed records-she has been on gabapentin in the past. Unclear why it was dc'd. Will start with a low dose and increase as tolerated.   Plan:gabapentin 100 mg HS. Chronic pain meds as below.     (M51.361) Degeneration of intervertebral disc of lumbar region with lower extremity pain  (M15.9) Osteoarthritis of multiple joints, unspecified osteoarthritis type  (M25.561,  M25.562,  G89.29) Bilateral chronic knee pain  Comment: acute back pain earlier this month has resolved. Chronic knee pain improved after steroid injections 6/17/2025  Plan: gabapentin as above. Continue tylenol, tramadol, lidocaine patches. Repeat steroid injections prn. Continue home therapies     (N18.32) Stage 3b chronic kidney disease (H)  Comment: creatinine 1.41 on 3/20/2024, baseline  Plan: avoid nephrotoxins, renal dose meds. Encourage hydration     (R41.89) Cognitive impairment  Comment: gradual decline in both cognition and functional status over the past several months.  Plan: cognitive testing per home therapies. AL staff assistance with cares, meals, activities, med admin        Electronically signed by: FER Muniz CNP

## 2025-06-19 NOTE — LETTER
6/19/2025      Grace Vega  [facility] [address]  [facility] [address]        No notes on file      Sincerely,        FER Nicholson CNP    Electronically signed

## 2025-06-20 DIAGNOSIS — Z53.9 DIAGNOSIS NOT YET DEFINED: Primary | ICD-10-CM

## 2025-06-20 PROCEDURE — G0180 MD CERTIFICATION HHA PATIENT: HCPCS | Performed by: NURSE PRACTITIONER

## 2025-06-30 ENCOUNTER — ASSISTED LIVING VISIT (OUTPATIENT)
Dept: GERIATRICS | Facility: CLINIC | Age: OVER 89
End: 2025-06-30
Payer: COMMERCIAL

## 2025-06-30 VITALS
SYSTOLIC BLOOD PRESSURE: 145 MMHG | RESPIRATION RATE: 20 BRPM | BODY MASS INDEX: 25.61 KG/M2 | HEART RATE: 86 BPM | TEMPERATURE: 98.2 F | WEIGHT: 140 LBS | DIASTOLIC BLOOD PRESSURE: 88 MMHG

## 2025-06-30 DIAGNOSIS — M51.361 DEGENERATION OF INTERVERTEBRAL DISC OF LUMBAR REGION WITH LOWER EXTREMITY PAIN: ICD-10-CM

## 2025-06-30 DIAGNOSIS — G62.9 NEUROPATHY: ICD-10-CM

## 2025-06-30 RX ORDER — GABAPENTIN 100 MG/1
200 CAPSULE ORAL AT BEDTIME
Qty: 60 CAPSULE | Refills: 11 | Status: SHIPPED | OUTPATIENT
Start: 2025-06-30

## 2025-06-30 NOTE — PROGRESS NOTES
[facility] GERIATRICS    Chief Complaint   Patient presents with    RECHECK     HPI:  Grace Vega is a 89 year old  (3/15/1929), who is being seen today for an episodic care visit at: [facility]) [[id number]]. Today's concern is: ***    Allergies, and PMH/PSH reviewed in EPIC today.  REVIEW OF SYSTEMS:  {faiedk56:855963}    Objective:   BP (!) 145/88   Pulse 86   Temp 98.2  F (36.8  C)   Resp 20   Wt 63.5 kg (140 lb)   BMI 25.61 kg/m    {Nursing home physical exam :210270}    {fgslab:378959}    Assessment/Plan:  {S DX2:401773}    MED REC REQUIRED{TIP  Click the link below to document or use med rec list, use list to pull in response :945281}  Post Medication Reconciliation Status: {MED REC LIST:156593}      Orders:  {fgsorders:611367}  ***    Electronically signed by: Cici Osborne ***

## 2025-06-30 NOTE — LETTER
6/30/2025      Grace Vega  [facility] [address]  [facility] [address]        No notes on file      Sincerely,        FER Nicholson CNP    Electronically signed

## 2025-07-14 DIAGNOSIS — M54.6 ACUTE MIDLINE THORACIC BACK PAIN: ICD-10-CM

## 2025-07-14 DIAGNOSIS — M15.9 OSTEOARTHRITIS OF MULTIPLE JOINTS, UNSPECIFIED OSTEOARTHRITIS TYPE: ICD-10-CM

## 2025-07-14 DIAGNOSIS — M51.361 DEGENERATION OF INTERVERTEBRAL DISC OF LUMBAR REGION WITH LOWER EXTREMITY PAIN: ICD-10-CM

## 2025-07-15 RX ORDER — TRAMADOL HYDROCHLORIDE 50 MG/1
TABLET ORAL
Qty: 30 TABLET | Refills: 1 | Status: SHIPPED | OUTPATIENT
Start: 2025-07-15

## 2025-07-25 DIAGNOSIS — Z53.9 DIAGNOSIS NOT YET DEFINED: Primary | ICD-10-CM

## 2025-07-25 PROCEDURE — G0179 MD RECERTIFICATION HHA PT: HCPCS | Performed by: NURSE PRACTITIONER

## 2025-07-31 ENCOUNTER — ASSISTED LIVING VISIT (OUTPATIENT)
Dept: GERIATRICS | Facility: CLINIC | Age: OVER 89
End: 2025-07-31
Payer: COMMERCIAL

## 2025-07-31 VITALS
SYSTOLIC BLOOD PRESSURE: 140 MMHG | RESPIRATION RATE: 18 BRPM | HEART RATE: 81 BPM | WEIGHT: 138.4 LBS | TEMPERATURE: 97 F | BODY MASS INDEX: 25.31 KG/M2 | DIASTOLIC BLOOD PRESSURE: 87 MMHG

## 2025-07-31 DIAGNOSIS — M15.9 OSTEOARTHRITIS OF MULTIPLE JOINTS, UNSPECIFIED OSTEOARTHRITIS TYPE: ICD-10-CM

## 2025-07-31 DIAGNOSIS — G62.9 NEUROPATHY: ICD-10-CM

## 2025-07-31 DIAGNOSIS — R60.0 BILATERAL LEG EDEMA: Primary | ICD-10-CM

## 2025-07-31 DIAGNOSIS — M51.361 DEGENERATION OF INTERVERTEBRAL DISC OF LUMBAR REGION WITH LOWER EXTREMITY PAIN: ICD-10-CM

## 2025-07-31 DIAGNOSIS — R41.89 COGNITIVE IMPAIRMENT: ICD-10-CM

## 2025-07-31 PROCEDURE — 99349 HOME/RES VST EST MOD MDM 40: CPT | Performed by: NURSE PRACTITIONER

## 2025-07-31 NOTE — PROGRESS NOTES
[facility] GERIATRICS    Chief Complaint   Patient presents with    RECHECK     HPI:  Grace Vega is a 89 year old  (3/15/1929), who is being seen today for an episodic care visit at: [facility]) [[id number]]. Today's concern is: ***    Allergies, and PMH/PSH reviewed in EPIC today.  REVIEW OF SYSTEMS:  {ncvshi45:099117}    Objective:   BP (!) 140/87   Pulse 81   Temp 97  F (36.1  C)   Resp 18   Wt 62.8 kg (138 lb 6.4 oz)   BMI 25.31 kg/m    {Nursing home physical exam :847408}    {fgslab:824711}    Assessment/Plan:  {FGS DX2:611415}    MED REC REQUIRED{TIP  Click the link below to document or use med rec list, use list to pull in response :970819}  Post Medication Reconciliation Status: {MED REC LIST:938048}      Orders:  {fgsorders:666799}  ***    Electronically signed by: Cici Osborne ***       in the HPI,  is negative    Objective:   BP (!) 140/87   Pulse 81   Temp 97  F (36.1  C)   Resp 18   Wt 62.8 kg (138 lb 6.4 oz)   BMI 25.31 kg/m    GENERAL APPEARANCE:  Alert, in no distress  ENT:  [place]   EYES:  conjunctiva and lids normal  RESP:  lungs clear bilaterally, no crackles or wheezes   CV:  regular rate and rhythm, no murmur, 1+ edema of both calves, tender to palpation, no warmth or erythema. No pedal edema   ABDOMEN:  soft, non-tender, no distension   M/S: wheelchair.  LOPEZ.  Degenerative changes both knees, no acute inflammation   SKIN:  no  rashes or open areas   PSYCH:  oriented X 2-3, insight and memory impaired, affect and mood normal      XR 5/16/2025:  Lumbar impressions:  1. Examination is limited in that the lateral view was unable to be obtained. Mild lumbar levoscoliosis (10 degrees Love's angle at L3 level)  2. No compression deformities or fractures demonstrated radiographically. If there is persistent pain, follow up CT or MRI may be obtained as clinically warranted.   3. Mild degree of osteopenia  4. Mild spondylosis demonstrated.     Thoracic impressions:  1. Mild dextroscoliosis with no subluxation. (13 degrees Love's angle at T10 level.)  2. No compression deformities or fractures demonstrated radiographically. If there is persistent pain follow up CT or MRI may be obtained as clinically warranted.   3. Mild degree of osteopenia  4. Mild spondylosis.       Recent labs in [facility] reviewed by me today.       ASSESSMENT / PLAN:  (R60.0) Bilateral leg edema  (primary encounter diagnosis)  Comment: new onset. 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     (R41.89) Cognitive impairment  Comment: gradual decline. It's becoming more difficult to obtain history  Plan: AL staff assistance with cares, meals, activities, med admin          Electronically signed by: FER Muniz CNP

## 2025-07-31 NOTE — LETTER
7/31/2025      Grace Vega  [place] [address]  [place] [address]        No notes on file      Sincerely,        FER Nicholson CNP    Electronically signed   reviewed in EPIC today    REVIEW OF SYSTEMS:  4 point ROS including Respiratory, CV, GI and , other than that noted in the HPI,  is negative    Objective:   BP (!) 140/87   Pulse 81   Temp 97  F (36.1  C)   Resp 18   Wt 62.8 kg (138 lb 6.4 oz)   BMI 25.31 kg/m    GENERAL APPEARANCE:  Alert, in no distress  ENT:  [place]   EYES:  conjunctiva and lids normal  RESP:  lungs clear bilaterally, no crackles or wheezes   CV:  regular rate and rhythm, no murmur, 1+ edema of both calves, tender to palpation, no warmth or erythema. No pedal edema   ABDOMEN:  soft, non-tender, no distension   M/S: wheelchair.  LOPEZ.  Degenerative changes both knees, no acute inflammation   SKIN:  no  rashes or open areas   PSYCH:  oriented X 2-3, insight and memory impaired, affect and mood normal      XR 5/16/2025:  Lumbar impressions:  1. Examination is limited in that the lateral view was unable to be obtained. Mild lumbar levoscoliosis (10 degrees Love's angle at L3 level)  2. No compression deformities or fractures demonstrated radiographically. If there is persistent pain, follow up CT or MRI may be obtained as clinically warranted.   3. Mild degree of osteopenia  4. Mild spondylosis demonstrated.     Thoracic impressions:  1. Mild dextroscoliosis with no subluxation. (13 degrees Love's angle at T10 level.)  2. No compression deformities or fractures demonstrated radiographically. If there is persistent pain follow up CT or MRI may be obtained as clinically warranted.   3. Mild degree of osteopenia  4. Mild spondylosis.       Recent labs in [facility] reviewed by me today.       ASSESSMENT / PLAN:  (R60.0) Bilateral leg edema  (primary encounter diagnosis)  Comment: new onset. 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     (R41.89) Cognitive impairment  Comment: gradual decline. It's becoming more difficult to obtain history  Plan: AL staff assistance with cares, meals, activities, med admin          Electronically signed by: FER Muniz CNP                                 Sincerely,        FER Nicholson CNP    Electronically signed

## 2025-08-04 ENCOUNTER — ASSISTED LIVING VISIT (OUTPATIENT)
Dept: GERIATRICS | Facility: CLINIC | Age: OVER 89
End: 2025-08-04
Payer: COMMERCIAL

## 2025-08-04 VITALS
TEMPERATURE: 97.6 F | SYSTOLIC BLOOD PRESSURE: 137 MMHG | HEART RATE: 81 BPM | DIASTOLIC BLOOD PRESSURE: 76 MMHG | BODY MASS INDEX: 25.31 KG/M2 | WEIGHT: 138.4 LBS | RESPIRATION RATE: 14 BRPM

## 2025-08-04 DIAGNOSIS — M15.9 OSTEOARTHRITIS OF MULTIPLE JOINTS, UNSPECIFIED OSTEOARTHRITIS TYPE: ICD-10-CM

## 2025-08-04 DIAGNOSIS — R60.0 BILATERAL LEG EDEMA: Primary | ICD-10-CM

## 2025-08-04 DIAGNOSIS — R41.89 COGNITIVE IMPAIRMENT: ICD-10-CM

## 2025-08-04 DIAGNOSIS — G62.9 NEUROPATHY: ICD-10-CM

## 2025-08-04 DIAGNOSIS — M51.361 DEGENERATION OF INTERVERTEBRAL DISC OF LUMBAR REGION WITH LOWER EXTREMITY PAIN: ICD-10-CM

## 2025-08-04 DIAGNOSIS — N18.32 STAGE 3B CHRONIC KIDNEY DISEASE (H): ICD-10-CM

## 2025-08-04 PROCEDURE — 99349 HOME/RES VST EST MOD MDM 40: CPT | Performed by: NURSE PRACTITIONER

## 2025-08-04 RX ORDER — FUROSEMIDE 20 MG/1
20 TABLET ORAL DAILY
Qty: 2 TABLET | Refills: 0 | Status: SHIPPED | OUTPATIENT
Start: 2025-08-04 | End: 2025-08-06

## 2025-08-04 RX ORDER — GABAPENTIN 100 MG/1
100 CAPSULE ORAL AT BEDTIME
Qty: 30 CAPSULE | Refills: 11 | Status: SHIPPED | OUTPATIENT
Start: 2025-08-04

## 2025-08-04 RX ORDER — POTASSIUM CHLORIDE 1500 MG/1
20 TABLET, EXTENDED RELEASE ORAL DAILY
Qty: 2 TABLET | Refills: 0 | Status: SHIPPED | OUTPATIENT
Start: 2025-08-04 | End: 2025-08-06

## 2025-08-10 ENCOUNTER — HEALTH MAINTENANCE LETTER (OUTPATIENT)
Age: OVER 89
End: 2025-08-10

## 2025-08-11 ENCOUNTER — LAB REQUISITION (OUTPATIENT)
Dept: LAB | Facility: CLINIC | Age: OVER 89
End: 2025-08-11
Payer: COMMERCIAL

## 2025-08-18 ENCOUNTER — ASSISTED LIVING VISIT (OUTPATIENT)
Dept: GERIATRICS | Facility: CLINIC | Age: OVER 89
End: 2025-08-18
Payer: COMMERCIAL

## 2025-08-25 ENCOUNTER — LAB REQUISITION (OUTPATIENT)
Dept: LAB | Facility: CLINIC | Age: OVER 89
End: 2025-08-25
Payer: COMMERCIAL

## 2025-08-25 DIAGNOSIS — R60.9 EDEMA, UNSPECIFIED: ICD-10-CM

## 2025-08-28 LAB
ANION GAP SERPL CALCULATED.3IONS-SCNC: 15 MMOL/L (ref 7–15)
BUN SERPL-MCNC: 24.1 MG/DL (ref 8–23)
CALCIUM SERPL-MCNC: 9.1 MG/DL (ref 8.8–10.4)
CHLORIDE SERPL-SCNC: 106 MMOL/L (ref 98–107)
CREAT SERPL-MCNC: 1.47 MG/DL (ref 0.51–0.95)
EGFRCR SERPLBLD CKD-EPI 2021: 32 ML/MIN/1.73M2
GLUCOSE SERPL-MCNC: 96 MG/DL (ref 70–99)
HCO3 SERPL-SCNC: 22 MMOL/L (ref 22–29)
POTASSIUM SERPL-SCNC: 4.5 MMOL/L (ref 3.4–5.3)
SODIUM SERPL-SCNC: 143 MMOL/L (ref 135–145)

## 2025-09-03 DIAGNOSIS — M51.361 DEGENERATION OF INTERVERTEBRAL DISC OF LUMBAR REGION WITH LOWER EXTREMITY PAIN: ICD-10-CM

## 2025-09-03 DIAGNOSIS — M54.6 ACUTE MIDLINE THORACIC BACK PAIN: ICD-10-CM

## 2025-09-03 DIAGNOSIS — M15.9 OSTEOARTHRITIS OF MULTIPLE JOINTS, UNSPECIFIED OSTEOARTHRITIS TYPE: ICD-10-CM

## 2025-09-03 RX ORDER — TRAMADOL HYDROCHLORIDE 50 MG/1
TABLET ORAL
Qty: 30 TABLET | Refills: 0 | Status: SHIPPED | OUTPATIENT
Start: 2025-09-03